# Patient Record
Sex: FEMALE | Race: WHITE | NOT HISPANIC OR LATINO | Employment: OTHER | URBAN - METROPOLITAN AREA
[De-identification: names, ages, dates, MRNs, and addresses within clinical notes are randomized per-mention and may not be internally consistent; named-entity substitution may affect disease eponyms.]

---

## 2017-01-23 ENCOUNTER — COMMUNICATION - HEALTHEAST (OUTPATIENT)
Dept: FAMILY MEDICINE | Facility: CLINIC | Age: 72
End: 2017-01-23

## 2017-01-23 DIAGNOSIS — E03.9 UNSPECIFIED HYPOTHYROIDISM: ICD-10-CM

## 2017-01-23 DIAGNOSIS — E78.5 HYPERLIPIDEMIA: ICD-10-CM

## 2017-04-13 ENCOUNTER — COMMUNICATION - HEALTHEAST (OUTPATIENT)
Dept: SCHEDULING | Facility: CLINIC | Age: 72
End: 2017-04-13

## 2017-04-13 DIAGNOSIS — Z91.09 OTHER ALLERGY, OTHER THAN TO MEDICINAL AGENTS: ICD-10-CM

## 2017-04-18 ENCOUNTER — COMMUNICATION - HEALTHEAST (OUTPATIENT)
Dept: FAMILY MEDICINE | Facility: CLINIC | Age: 72
End: 2017-04-18

## 2017-04-18 DIAGNOSIS — Z91.09 OTHER ALLERGY, OTHER THAN TO MEDICINAL AGENTS: ICD-10-CM

## 2017-04-21 ENCOUNTER — COMMUNICATION - HEALTHEAST (OUTPATIENT)
Dept: FAMILY MEDICINE | Facility: CLINIC | Age: 72
End: 2017-04-21

## 2017-04-21 DIAGNOSIS — I10 UNSPECIFIED ESSENTIAL HYPERTENSION: ICD-10-CM

## 2017-04-21 DIAGNOSIS — E53.8 OTHER B-COMPLEX DEFICIENCIES: ICD-10-CM

## 2017-04-21 DIAGNOSIS — N39.41 URGE URINARY INCONTINENCE: ICD-10-CM

## 2017-04-21 DIAGNOSIS — E03.9 UNSPECIFIED HYPOTHYROIDISM: ICD-10-CM

## 2017-04-25 ENCOUNTER — COMMUNICATION - HEALTHEAST (OUTPATIENT)
Dept: FAMILY MEDICINE | Facility: CLINIC | Age: 72
End: 2017-04-25

## 2017-04-25 DIAGNOSIS — E03.9 UNSPECIFIED HYPOTHYROIDISM: ICD-10-CM

## 2017-04-25 DIAGNOSIS — N39.41 URGE URINARY INCONTINENCE: ICD-10-CM

## 2017-04-25 DIAGNOSIS — I10 UNSPECIFIED ESSENTIAL HYPERTENSION: ICD-10-CM

## 2017-05-01 ENCOUNTER — HOSPITAL ENCOUNTER (OUTPATIENT)
Dept: MAMMOGRAPHY | Facility: CLINIC | Age: 72
Discharge: HOME | End: 2017-05-01
Attending: FAMILY MEDICINE

## 2017-05-01 DIAGNOSIS — Z12.31 VISIT FOR SCREENING MAMMOGRAM: ICD-10-CM

## 2017-05-18 ENCOUNTER — RECORDS - HEALTHEAST (OUTPATIENT)
Dept: ADMINISTRATIVE | Facility: OTHER | Age: 72
End: 2017-05-18

## 2017-05-30 ENCOUNTER — COMMUNICATION - HEALTHEAST (OUTPATIENT)
Dept: FAMILY MEDICINE | Facility: CLINIC | Age: 72
End: 2017-05-30

## 2017-05-30 DIAGNOSIS — Z91.09 OTHER ALLERGY, OTHER THAN TO MEDICINAL AGENTS: ICD-10-CM

## 2017-07-11 ENCOUNTER — OFFICE VISIT - HEALTHEAST (OUTPATIENT)
Dept: FAMILY MEDICINE | Facility: CLINIC | Age: 72
End: 2017-07-11

## 2017-07-11 DIAGNOSIS — E03.9 UNSPECIFIED HYPOTHYROIDISM: ICD-10-CM

## 2017-07-11 DIAGNOSIS — E53.8 OTHER B-COMPLEX DEFICIENCIES: ICD-10-CM

## 2017-07-11 DIAGNOSIS — I10 ESSENTIAL HYPERTENSION WITH GOAL BLOOD PRESSURE LESS THAN 140/90: ICD-10-CM

## 2017-07-11 DIAGNOSIS — E55.9 VITAMIN D DEFICIENCY: ICD-10-CM

## 2017-07-11 DIAGNOSIS — N39.41 URGE URINARY INCONTINENCE: ICD-10-CM

## 2017-07-11 DIAGNOSIS — I10 UNSPECIFIED ESSENTIAL HYPERTENSION: ICD-10-CM

## 2017-07-11 DIAGNOSIS — E03.9 HYPOTHYROIDISM: ICD-10-CM

## 2017-07-11 DIAGNOSIS — Z91.09 OTHER ALLERGY, OTHER THAN TO MEDICINAL AGENTS: ICD-10-CM

## 2017-07-11 DIAGNOSIS — E78.5 HYPERLIPIDEMIA: ICD-10-CM

## 2017-07-11 ASSESSMENT — MIFFLIN-ST. JEOR: SCORE: 1122.31

## 2017-07-24 ENCOUNTER — COMMUNICATION - HEALTHEAST (OUTPATIENT)
Dept: FAMILY MEDICINE | Facility: CLINIC | Age: 72
End: 2017-07-24

## 2017-08-02 ENCOUNTER — TRANSFERRED RECORDS (OUTPATIENT)
Dept: HEALTH INFORMATION MANAGEMENT | Facility: CLINIC | Age: 72
End: 2017-08-02

## 2017-08-05 ENCOUNTER — TRANSFERRED RECORDS (OUTPATIENT)
Dept: HEALTH INFORMATION MANAGEMENT | Facility: CLINIC | Age: 72
End: 2017-08-05

## 2017-08-07 ENCOUNTER — NURSING HOME VISIT (OUTPATIENT)
Dept: GERIATRICS | Facility: CLINIC | Age: 72
End: 2017-08-07
Payer: MEDICARE

## 2017-08-07 VITALS
HEART RATE: 96 BPM | TEMPERATURE: 99 F | BODY MASS INDEX: 31.06 KG/M2 | WEIGHT: 158.2 LBS | DIASTOLIC BLOOD PRESSURE: 90 MMHG | OXYGEN SATURATION: 96 % | RESPIRATION RATE: 18 BRPM | SYSTOLIC BLOOD PRESSURE: 138 MMHG | HEIGHT: 60 IN

## 2017-08-07 DIAGNOSIS — D64.9 POSTOPERATIVE ANEMIA: ICD-10-CM

## 2017-08-07 DIAGNOSIS — R53.81 PHYSICAL DECONDITIONING: ICD-10-CM

## 2017-08-07 DIAGNOSIS — Z47.1 AFTERCARE FOLLOWING RIGHT HIP JOINT REPLACEMENT SURGERY: Primary | ICD-10-CM

## 2017-08-07 DIAGNOSIS — Z96.641 S/P TOTAL HIP ARTHROPLASTY, RIGHT: ICD-10-CM

## 2017-08-07 DIAGNOSIS — M81.0 OSTEOPOROSIS, UNSPECIFIED OSTEOPOROSIS TYPE, UNSPECIFIED PATHOLOGICAL FRACTURE PRESENCE: ICD-10-CM

## 2017-08-07 DIAGNOSIS — I10 ESSENTIAL HYPERTENSION, BENIGN: ICD-10-CM

## 2017-08-07 DIAGNOSIS — K59.01 SLOW TRANSIT CONSTIPATION: ICD-10-CM

## 2017-08-07 DIAGNOSIS — E03.9 HYPOTHYROIDISM, UNSPECIFIED TYPE: ICD-10-CM

## 2017-08-07 DIAGNOSIS — G47.33 OSA (OBSTRUCTIVE SLEEP APNEA): ICD-10-CM

## 2017-08-07 DIAGNOSIS — E78.5 HYPERLIPIDEMIA, UNSPECIFIED HYPERLIPIDEMIA TYPE: ICD-10-CM

## 2017-08-07 DIAGNOSIS — Z96.641 AFTERCARE FOLLOWING RIGHT HIP JOINT REPLACEMENT SURGERY: Primary | ICD-10-CM

## 2017-08-07 PROCEDURE — 99310 SBSQ NF CARE HIGH MDM 45: CPT | Performed by: NURSE PRACTITIONER

## 2017-08-07 RX ORDER — MOMETASONE FUROATE MONOHYDRATE 50 UG/1
2 SPRAY, METERED NASAL DAILY
COMMUNITY
End: 2019-06-12

## 2017-08-07 NOTE — PROGRESS NOTES
[facility]  PRIMARY CARE PROVIDER AND CLINIC:  No primary care provider on file. No primary physician on file.  Chief Complaint   Patient presents with     Hospital F/U       HPI:    Claudia Powers is a 72 year old  (1945),admitted to the [facility]CU from [facility].  Hospital stay 08/02/2017 through 08/05/2017.  Admitted to this facility for  rehab, medical management and nursing care.  HPI information obtained from: facility chart records, facility staff, patient report and [facility] chart review.      Current issues are:      Aftercare following right hip joint replacement surgery  S/P total hip arthroplasty, right  Physical deconditioning  Post op anemia  Patient with history hip pain and is now s/p right MU on 8/2/17 by Dr Zapata at [facility]. She did not have any complications post op. She is on Lovenox x 2 weeks, then transition to Xarelto x 30 days for DVT prophylaxis and admitted to TCU for rehab. At this time up with walker and assist. Pain not well managed - agrees with short term scheduled pain meds. Dressing right hip to be changed daily per DC orders. Hgb at [facility] in the 9 range post op. No s/s bleeding or bruising at TCU.     Osteoporosis, unspecified osteoporosis type, unspecified pathological fracture presence  Chronic issue and takes ergocalciferol, Prolia and calcium.     Hypothyroidism, unspecified type  By history. PTA synthroid continued.     Hyperlipidemia, unspecified hyperlipidemia type  By history. Takes Simvastatin daily. No changes.     Essential hypertension, benign  Since admission some elevated BPs - suspect due to pain. BP Range: 138-174/81-95, HR Range: 67-96 bpm, Wt Range: 158.2 - 160lbs. Takes Metoprolol 25 mg PO daily.   BP Readings from Last 3 Encounters:   08/07/17 138/90   09/07/16 114/71   08/30/16 105/71     МАРИЯ (obstructive sleep apnea)  Does not use CPAP apparently. No respiratory issues at TCU since admission.     Slow  transit constipation  Reports regular stools - doing well with Miralax and Senna S daily.       CODE STATUS/ADVANCE DIRECTIVES DISCUSSION:   Full Code until able to review/complete POLST  Patient's living condition: lives alone    ALLERGIES:Adhesive tape; Banana; Bees; Ondansetron; Pentazocine; Raspberry; Strawberry; and Talwin nx [pentazocine-naloxone]  PAST MEDICAL HISTORY:  has no past medical history on file.  PAST SURGICAL HISTORY:  has no past surgical history on file.  FAMILY HISTORY: family history is not on file.  SOCIAL HISTORY:      Post Discharge Medication Reconciliation Status: discharge medications reconciled and changed, per note/orders (see AVS).  Current Outpatient Prescriptions   Medication Sig Dispense Refill     enoxaparin (LOVENOX) 40 MG/0.4ML injection Inject 40 mg Subcutaneous daily Until 8/15/2017 then transition to xarelto       mometasone (NASONEX) 50 MCG/ACT spray Spray 2 sprays into both nostrils daily       denosumab (PROLIA) 60 MG/ML SOLN injection Inject 60 mg Subcutaneous every 6 months Due December 2017       OXYCODONE HCL PO Take 5 mg by mouth every 8 hours       polyethylene glycol (MIRALAX/GLYCOLAX) powder Take 17 g by mouth daily as needed May use with or in place of Senna-doc. mix with 6 oz of water. Hold for loose stools       senna-docusate (SENOKOT-S;PERICOLACE) 8.6-50 MG per tablet Take 1 tablet by mouth daily Hold for loose stools.        ACETAMINOPHEN PO Take 1,000 mg by mouth 3 times daily        EPINEPHrine (EPIPEN) 0.3 MG/0.3ML injection Inject 0.3 mg into the muscle once as needed for anaphylaxis       LEVOTHYROXINE SODIUM PO Take 75 mcg by mouth daily       METOPROLOL SUCCINATE ER PO Take 25 mg by mouth daily       OXYCODONE HCL PO Take 5 mg by mouth every 4 hours as needed Do not take within 6 hours of Tramadol       TRAMADOL HCL PO Take 50 mg by mouth every 6 hours as needed for moderate to severe pain Do not take within 6 hours of oxycodone       SIMVASTATIN PO  Take 40 mg by mouth At Bedtime       calcium carbonate (OS-MARCEL 600 MG [place]. [place]) 600 MG TABS tablet Take 2 tablets by mouth daily       vitamin D (ERGOCALCIFEROL) 03848 UNIT capsule Take 50,000 Units by mouth daily Take every Mon, Fri       oxybutynin (DITROPAN-XL) 10 MG 24 hr tablet Take 10 mg by mouth daily       rivaroxaban ANTICOAGULANT (XARELTO) 10 MG TABS tablet Take 10 mg by mouth daily (with dinner) Start on 9/7 and stop on 10/4         ROS:  10 point ROS of systems including Constitutional, Eyes, Respiratory, Cardiovascular, Gastroenterology, Genitourinary, Integumentary, Musculoskeletal, Psychiatric were all negative except for pertinent positives noted in my HPI.    Exam:  /90  Pulse 96  Temp 99  F (37.2  C)  Resp 18  Ht 5' (1.524 m)  Wt 158 lb 3.2 oz (71.8 kg)  SpO2 96%  BMI 30.9 kg/m2  GENERAL APPEARANCE:  Alert, in no distress, pleasant, cooperative, oriented x 4  EYES:  EOM, lids, pupils and irises normal, sclera clear and conjunctiva normal, no discharge or mattering on lids or lashes noted  ENT:  Mouth normal, moist mucous membranes, nose normal without drainage or crusting, external ears without lesions, hearing acuity intact  NECK:  Nontender, supple, symmetrical; no adenopathy, masses or thyromegaly, trachea midline  RESP:  respiratory effort and palpation of chest normal, no chest wall tenderness, no respiratory distress, Lung sounds clear, patient is on room air  CV:  Palpation and auscultation of heart done, rate and rhythm controlled, no murmur, no rub or gallop. Edema trace bilateral lower extremities. VASCULAR: no open areas lower legs  ABDOMEN:  normal bowel sounds, soft, nontender, no grimacing or guarding with palpation, no hepatosplenomegaly or other masses  M/S:   Gait and station ambulates independently with walker and walks with assist , Digits and nails normal, no tenderness or swelling of the joints; able to move all extremities   SKIN:  Inspection and palpation of skin  and subcutaneous tissue: right hip incision intact, no redness or drainage covered with dry clean dressing  NEURO: cranial nerves 2-12 grossly intact, no facial asymmetry, no speech deficits and able to follow directions, moves all extremities symmetrically  PSYCH:  insight and judgement intact, memory intact, affect and mood normal     Lab/Diagnostic data:  [place] labs:  8/5/2017  Hemoglobin 9.5  Hematocrit 29.2  Erythrocytes 3.17  MCV  92.1  RBC  13.8  Leukocytes 4.4  Platelet count 200  Sodium  141  Potassium 3.6  Glucose 92  Creatinine 0.3  BUN  15  Chloride 103  Bicaroonate 27  Anion Gap 11      ASSESSMENT/PLAN:  Aftercare following right hip joint replacement surgery  S/P total hip arthroplasty, right  Post op anemia  Ongoing issue, healing post op. Pain not well managed- schedule pain meds short term.     Osteoporosis, unspecified osteoporosis type, unspecified pathological fracture presence  Chronic, meds as above. Monitor.     Hypothyroidism, unspecified type  Chronic. PTA synthroid.     Hyperlipidemia, unspecified hyperlipidemia type  Chronic. PTA statin.     Essential hypertension, benign  Chronic, elevated since admission at times - feels this is r/t pain. Monitor for now. Increase pain meds as above.     МАРИЯ (obstructive sleep apnea)  Chronic, does not use CPAP. No respiratory issues - monitor.     Slow transit constipation  Acute with surgery and pain meds; managed well with current meds. Monitor.     Physical deconditioning  Acute with surgery - therapies, f/u with progress next week.     Orders:  1. Oxycodone 5 mg PO every 8 hrs scheduled x 7 days, then stop. Continue PRN oxycodone as ordered.   2. OK to give PRN ultram if PRN oxycodone used and ineffective.   3. Change Tylenol to 1000 mg PO TID and DC PRN tylenol diagnosis pain  4. Change right hip dressing daily and PRN  5. Check CBC, BMP on 8/9 diagnosis HTN, anemia post op    Total time spent with patient visit at the skilled nursing facility was  35 min including patient visit and review of past records. Greater than 50% of total time spent with counseling and coordinating care due to above health care needs, POC discussion    Electronically signed by:  KAIDEN Clancy CNP

## 2017-08-09 ENCOUNTER — TRANSFERRED RECORDS (OUTPATIENT)
Dept: HEALTH INFORMATION MANAGEMENT | Facility: CLINIC | Age: 72
End: 2017-08-09

## 2017-08-09 LAB
ANION GAP SERPL CALCULATED.3IONS-SCNC: 8 MMOL/L (ref 3–14)
BUN SERPL-MCNC: 16 MG/DL (ref 7–30)
CALCIUM SERPL-MCNC: 8.1 MG/DL (ref 8.5–10.1)
CHLORIDE SERPLBLD-SCNC: 105 MMOL/L (ref 94–109)
CO2 SERPL-SCNC: 28 MMOL/L (ref 20–32)
CREAT SERPL-MCNC: 0.37 MG/DL (ref 0.52–1.04)
ERYTHROCYTE [DISTWIDTH] IN BLOOD BY AUTOMATED COUNT: 14.2 % (ref 10–15)
GFR SERPL CREATININE-BSD FRML MDRD: >90 ML/MIN/1.73M2
GLUCOSE SERPL-MCNC: 78 MG/DL (ref 70–99)
HCT VFR BLD AUTO: 30.6 % (ref 35–47)
HEMOGLOBIN: 10 G/DL (ref 11.7–15.7)
MCH RBC QN AUTO: 29.8 PG (ref 26.5–33)
MCHC RBC AUTO-ENTMCNC: 32.7 G/DL (ref 31.5–36.5)
MCV RBC AUTO: 91 FL (ref 78–100)
PLATELET # BLD AUTO: 314 10^9/L (ref 150–450)
POTASSIUM SERPL-SCNC: 3.4 MMOL/L (ref 3.4–5.3)
RBC # BLD AUTO: 3.36 10^12/L (ref 3.8–5.2)
SODIUM SERPL-SCNC: 141 MMOL/L (ref 133–144)
WBC # BLD AUTO: 4.4 10^9/L (ref 4–11)

## 2017-08-10 ENCOUNTER — NURSING HOME VISIT (OUTPATIENT)
Dept: GERIATRICS | Facility: CLINIC | Age: 72
End: 2017-08-10
Payer: MEDICARE

## 2017-08-10 VITALS
SYSTOLIC BLOOD PRESSURE: 131 MMHG | TEMPERATURE: 99.7 F | BODY MASS INDEX: 30.7 KG/M2 | DIASTOLIC BLOOD PRESSURE: 75 MMHG | WEIGHT: 156.4 LBS | OXYGEN SATURATION: 96 % | HEIGHT: 60 IN | RESPIRATION RATE: 18 BRPM | HEART RATE: 90 BPM

## 2017-08-10 VITALS
HEIGHT: 60 IN | SYSTOLIC BLOOD PRESSURE: 131 MMHG | BODY MASS INDEX: 30.7 KG/M2 | WEIGHT: 156.4 LBS | DIASTOLIC BLOOD PRESSURE: 75 MMHG | OXYGEN SATURATION: 96 % | HEART RATE: 90 BPM | TEMPERATURE: 99.7 F | RESPIRATION RATE: 18 BRPM

## 2017-08-10 DIAGNOSIS — Z86.718 HISTORY OF VENOUS THROMBOEMBOLISM: ICD-10-CM

## 2017-08-10 DIAGNOSIS — K59.01 SLOW TRANSIT CONSTIPATION: ICD-10-CM

## 2017-08-10 DIAGNOSIS — I10 ESSENTIAL HYPERTENSION, BENIGN: ICD-10-CM

## 2017-08-10 DIAGNOSIS — R53.81 PHYSICAL DECONDITIONING: ICD-10-CM

## 2017-08-10 DIAGNOSIS — Z96.641 HISTORY OF TOTAL RIGHT HIP ARTHROPLASTY: Primary | ICD-10-CM

## 2017-08-10 PROBLEM — D62 ACUTE BLOOD LOSS ANEMIA: Status: ACTIVE | Noted: 2017-08-10

## 2017-08-10 PROCEDURE — 99309 SBSQ NF CARE MODERATE MDM 30: CPT | Performed by: NURSE PRACTITIONER

## 2017-08-10 NOTE — PROGRESS NOTES
[facility]    Chief Complaint   Patient presents with     RECHECK       HPI:    Claudia Powers is a 72 year old  (1945), who is being seen today for an episodic care visit at [place] [facility].  HPI information obtained from: facility chart records, facility staff, patient report and [facility] chart review.Today's concern is:  History of total right hip arthroplasty  Patient transferred to TCU following right hip replacement due to OA. There no post op complications. She does have right hip pain. She has been taking oxycodone TID and q 4 h prn. She would like to start cutting back on oxycodone. She she is walking with a walker and a cane.     History of venous thromboembolism, 2010  DVT prophylaxis is lovenox until 8/15 then xarelto for 30d    Essential hypertension, benign  BP's in TCU: 131/75, 138/90, 171/89  No recent change in BP meds  Slow transit constipation  Having regular bowel movments    Physical deconditioning  Lives in [place] in house. Hopes to be off of pain meds soon so she can drive        BP's in TCU: 131/75, 138/90, 171/89    ALLERGIES: Adhesive tape; Banana; Bees; Ondansetron; Pentazocine; Raspberry; Strawberry; and Talwin nx [pentazocine-naloxone]  Past Medical, Surgical, Family and Social History reviewed and updated in [facility].    Current Outpatient Prescriptions   Medication Sig Dispense Refill     enoxaparin (LOVENOX) 40 MG/0.4ML injection Inject 40 mg Subcutaneous daily Until 8/15/2017 then transition to xarelto       mometasone (NASONEX) 50 MCG/ACT spray Spray 2 sprays into both nostrils daily       denosumab (PROLIA) 60 MG/ML SOLN injection Inject 60 mg Subcutaneous every 6 months Due December 2017       OXYCODONE HCL PO Take 5 mg by mouth every 8 hours       polyethylene glycol (MIRALAX/GLYCOLAX) powder Take 17 g by mouth daily as needed May use with or in place of Senna-doc. mix with 6 oz of water. Hold for loose stools       senna-docusate  (SENOKOT-S;PERICOLACE) 8.6-50 MG per tablet Take 1 tablet by mouth daily Hold for loose stools.        ACETAMINOPHEN PO Take 1,000 mg by mouth 3 times daily        EPINEPHrine (EPIPEN) 0.3 MG/0.3ML injection Inject 0.3 mg into the muscle once as needed for anaphylaxis       LEVOTHYROXINE SODIUM PO Take 75 mcg by mouth daily       METOPROLOL SUCCINATE ER PO Take 25 mg by mouth daily       OXYCODONE HCL PO Take 5 mg by mouth every 4 hours as needed Do not take within 6 hours of Tramadol       TRAMADOL HCL PO Take 50 mg by mouth every 6 hours as needed for moderate to severe pain Do not take within 6 hours of oxycodone       SIMVASTATIN PO Take 40 mg by mouth At Bedtime       calcium carbonate (OS-MARCEL 600 MG [place]. [place]) 600 MG TABS tablet Take 2 tablets by mouth daily       vitamin D (ERGOCALCIFEROL) 85019 UNIT capsule Take 50,000 Units by mouth daily Take every Mon, Fri       oxybutynin (DITROPAN-XL) 10 MG 24 hr tablet Take 10 mg by mouth daily       rivaroxaban ANTICOAGULANT (XARELTO) 10 MG TABS tablet Take 10 mg by mouth daily (with dinner) Start on 9/7 and stop on 10/4       Medications reviewed:  Medications reconciled to facility chart and changes were made to reflect current medications as identified as above med list. Below are the changes that were made:   Medications stopped since last EPIC medication reconciliation:   There are no discontinued medications.    Medications started since last [facility] medication reconciliation:  No orders of the defined types were placed in this encounter.        REVIEW OF SYSTEMS:  4 point ROS including Respiratory, CV, GI and , other than that noted in the HPI,  is negative    Physical Exam:  /75  Pulse 90  Temp 99.7  F (37.6  C)  Resp 18  Ht 5' (1.524 m)  Wt 156 lb 6.4 oz (70.9 kg)  SpO2 96%  BMI 30.54 kg/m2  GENERAL APPEARANCE:  Alert, in no distress  ENT:  Mouth and posterior oropharynx normal, moist mucous membranes, hearing acuity leoncio q  EYES:  EOM,  conjunctivae, lids, pupils and irises normal  NECK:  No adenopathy,masses or thyromegaly  RESP:  respiratory effort and palpation of chest normal, no respiratory distress, Lung sounds clear  CV:  Palpation and auscultation of heart done , rate and rhythm reg, no murmur, no rub or gallop, Edema none  ABDOMEN:  normal bowel sounds, soft, nontender, no hepatosplenomegaly or other masses  M/S:   Gait and station steady, Digits and nails normal   SKIN:  Inspection/Palpation of skin and subcutaneous tissue bruise with swelling on inside of left knee. About 4cm  NEURO: 2-12 in normal limits and at patient's baseline  PSYCH:  insight and judgement, memory intact , affect and mood normal      Recent Labs:    8/9/17 hgb 10  [place] labs:  8/5/2017  Hemoglobin 9.5  Hematocrit 29.2  Erythrocytes 3.17  MCV  92.1  RBC  13.8  Leukocytes 4.4  Platelet count 200  Sodium  141  Potassium 3.6  Glucose 92  Creatinine 0.3  BUN  15  Chloride 103  Bicaroonate 27  Anion Gap 11    Assessment/Plan:  (Z96.641) History of total right hip arthroplasty  (primary encounter diagnosis)  Comment: s/p MU due to arthritis. Less pain today. Walking length of hallway with therapy. Has walked up and down steps  Plan: reduce oxycodone to TID, continue physical therapy     (Z86.638) History of venous thromboembolism, 2010  Comment: on loveonx for DVT prophylaxis  Plan: continue lovenox unitl 8/15 then start xarelto    (I10) Essential hypertension, benign  Comment: adequate control  Plan: no change    (K59.01) Slow transit constipation  Comment: having daily BMs  Plan: no change    (R53.81) Physical deconditioning  Comment: due to MU on right  Plan: physical therapy and OCCUPATIONAL THERAPY . Likely discharge soon    Electronically signed by  KAIDEN Parker CNP

## 2017-08-11 ENCOUNTER — NURSING HOME VISIT (OUTPATIENT)
Dept: GERIATRICS | Facility: CLINIC | Age: 72
End: 2017-08-11
Payer: MEDICARE

## 2017-08-11 DIAGNOSIS — R53.81 PHYSICAL DECONDITIONING: Primary | ICD-10-CM

## 2017-08-11 DIAGNOSIS — Z96.641 S/P TOTAL HIP ARTHROPLASTY, RIGHT: ICD-10-CM

## 2017-08-11 DIAGNOSIS — I10 ESSENTIAL HYPERTENSION, BENIGN: ICD-10-CM

## 2017-08-11 DIAGNOSIS — M16.0 PRIMARY OSTEOARTHRITIS OF BOTH HIPS: ICD-10-CM

## 2017-08-11 DIAGNOSIS — G47.33 OSA (OBSTRUCTIVE SLEEP APNEA): ICD-10-CM

## 2017-08-11 DIAGNOSIS — Z86.718 HISTORY OF VENOUS THROMBOEMBOLISM: ICD-10-CM

## 2017-08-11 PROCEDURE — 99305 1ST NF CARE MODERATE MDM 35: CPT | Performed by: INTERNAL MEDICINE

## 2017-08-11 NOTE — PROGRESS NOTES
PRIMARY CARE PROVIDER AND CLINIC RESPONSIBLE:  Katia Kenny, [address] [address]        ADMISSION HISTORY AND PHYSICAL EXAMINATION     Chief Complaint   Patient presents with     Fatigue     Musculoskeletal Problem         HISTORY OF PRESENT ILLNESS:  72 year old female, (1945), admitted to the [place] [facility] for continuation of medical care and rehab.    Claudia Powers is a 72 year old female with history of breast cancer, DVT, hypertension, hyperlipidemia, МАРИЯ, OA and osteoporosis who was admitted at [facility] from 8/2/17 to 8/5/17 due to elective right total hip arthroplasty. Postop course was uneventful. She has constipation which is resolved. She has also has pain. Hx of DVT, Lovenox for 2 weeks and Xarelto was ordered. She has pain at legt hip with movement. She has BM. She slept ok last night. Hx of sleep apnea but no using CPAP. Patient denies chest pain, dyspnea, abdominal pain, n&v, fever, chills, dysuria. The rest of ROS is unremarkable. Patient is seen and examined by me with NORRIS Savage NP. Please see NORRIS Savage's admit noted dated 8/7/17 for details of admission, past medical history, family history, allergies, medication list, social history and other details pertinent with this admission. Hospital admission and dc summary reviewed.    Patient Active Problem List    Diagnosis Date Noted     History of total right hip arthroplasty 08/10/2017     Priority: Medium     Acute blood loss anemia 08/10/2017     Priority: Medium     Osteoporosis, unspecified osteoporosis type, unspecified pathological fracture presence 08/07/2017     Priority: Medium     Hypothyroidism, unspecified type 08/07/2017     Priority: Medium     Hyperlipidemia, unspecified hyperlipidemia type 08/07/2017     Priority: Medium     Essential hypertension, benign 08/07/2017     Priority: Medium     МАРИЯ (obstructive sleep apnea) 08/07/2017     Priority: Medium     Slow transit constipation  08/07/2017     Priority: Medium     Physical deconditioning 08/07/2017     Priority: Medium     Status post total left knee replacement on 8/23/16 by Dr. Zapata 08/26/2016     Priority: Medium     Aftercare following left knee joint replacement surgery 08/26/2016     Priority: Medium     Status post total right knee replacement on 5/23/16 by  Dr. Mars 05/27/2016     Priority: Medium     Aftercare following right knee joint replacement surgery 05/27/2016     Priority: Medium     Degenerative joint disease of knee 05/27/2016     Priority: Medium     History of venous thromboembolism, 2010 05/27/2016     Priority: Medium     History of breast cancer 05/27/2016     Priority: Medium     Current Outpatient Prescriptions   Medication Sig     enoxaparin (LOVENOX) 40 MG/0.4ML injection Inject 40 mg Subcutaneous daily Until 8/15/2017 then transition to xarelto     mometasone (NASONEX) 50 MCG/ACT spray Spray 2 sprays into both nostrils daily     denosumab (PROLIA) 60 MG/ML SOLN injection Inject 60 mg Subcutaneous every 6 months Due December 2017     polyethylene glycol (MIRALAX/GLYCOLAX) powder Take 17 g by mouth daily as needed May use with or in place of Senna-doc. mix with 6 oz of water. Hold for loose stools     senna-docusate (SENOKOT-S;PERICOLACE) 8.6-50 MG per tablet Take 1 tablet by mouth daily Hold for loose stools.      ACETAMINOPHEN PO Take 1,000 mg by mouth 3 times daily      EPINEPHrine (EPIPEN) 0.3 MG/0.3ML injection Inject 0.3 mg into the muscle once as needed for anaphylaxis     LEVOTHYROXINE SODIUM PO Take 75 mcg by mouth daily     METOPROLOL SUCCINATE ER PO Take 25 mg by mouth daily     OXYCODONE HCL PO Take 5 mg by mouth 3 times daily And 5 mg every 6 hours as needed.     TRAMADOL HCL PO Take 50 mg by mouth every 6 hours as needed for moderate to severe pain Do not take within 6 hours of oxycodone     SIMVASTATIN PO Take 40 mg by mouth At Bedtime     calcium carbonate (OS-MARCEL 600 MG [place]. [place]) 600 MG TABS  tablet Take 2 tablets by mouth daily     vitamin D (ERGOCALCIFEROL) 01459 UNIT capsule Take 50,000 Units by mouth daily Take every Mon, Fri     oxybutynin (DITROPAN-XL) 10 MG 24 hr tablet Take 10 mg by mouth daily     rivaroxaban ANTICOAGULANT (XARELTO) 10 MG TABS tablet Take 10 mg by mouth daily (with dinner) Start on 9/7 and stop on 10/4     No current facility-administered medications for this visit.        Allergies   Allergen Reactions     Adhesive Tape      Banana      Bees      Ondansetron      Pentazocine      Raspberry      Strawberry      Talwin Nx [Pentazocine-Naloxone]        Social History     Social History     Marital status: Single     Spouse name: N/A     Number of children: N/A     Years of education: N/A     Occupational History     Not on file.     Social History Main Topics     Smoking status: Not on file     Smokeless tobacco: Not on file     Alcohol use Not on file     Drug use: Not on file     Sexual activity: Not on file     Other Topics Concern     Not on file     Social History Narrative     No narrative on file          Information reviewed:  Medications, vital signs, orders, nursing notes, problem list, hospital information.     ROS: All 10 point review of system completed, those pertinent positive, please see H&P, the remaining ROS is negative.    /75  Pulse 90  Temp 99.7  F (37.6  C)  Resp 18  Ht 5' (1.524 m)  Wt 156 lb 6.4 oz (70.9 kg)  SpO2 96%  BMI 30.54 kg/m2    PHYSICAL EXAMINATION:   GENERAL:  No acute distress.  SKIN:  Dry and warm.  There is no rash at area of skin examined.  HEENT:  Head without trauma.  Pupils round, reactive. Exam of conjunctiva and lids are normal. Sclera without icterus. There is no oral thrush.  NECK:  Supple. No jugular venous distension.  CHEST: No reproducible chest tenderness.   LUNGS:  Normal respiratory effort. Lungs reveal decreased breath sounds at bases. No rales or wheezes.  HEART:  Regular rate and rhythm.  No murmur, gallops or  rubs auscultated.  ABDOMEN:  Soft, bowel sounds positive.  There is no tenderness or guarding.   EXTREMITIES: trace edema right leg. Right hip surgery wound clean. Tender right hip and upper thigh.  NEUROLOGIC:  Alert and oriented x3.  There is no focal deficit appreciated.  PSYCH: Recent and remote memory is normal. Mood and affect are normal.    Lab/Diagnostic data:  Reviewed    [place] labs:  8/5/2017  Hemoglobin               9.5  Hematocrit                 29.2  Erythrocytes              3.17  MCV                                    92.1  RBC                                     13.8  Leukocytes                4.4  Platelet count            200  Sodium                      141  Potassium                 3.6  Glucose                     92  Creatinine                  0.3  BUN                                     15  Chloride                     103  Bicaroonate               27  Anion Gap                 11       ASSESSMENT / PLAN:     Physical deconditioning  Plan: PT/OT with increase independence, self-care, strength and endurance and other cares that help return to home/facility of origin, fall precautions. Care conference with patient and family for the progress of rehab and disposition issues will be discussed as planned. Rehab evaluation and other evaluations including CPT are at rehab logs, to be reviewed separately.  Fall risk assessment as well as cognitive evaluation will be formed during rehab stay if indicated.    Primary osteoarthritis of both hips S/P total hip arthroplasty, right  Plan: PT/OT, pain medication, DVT prophylaxis with Lovenox and then Xarelto as per orthopedic team. Follow up orthopedic clinic as scheduled. Staples removed as instructed. Incentive spirometry prn.  Hx of osteoporosis and pathologic fractures, continue current meds.    Essential hypertension, benign  Plan: Continue metoprolol    МАРИЯ (obstructive sleep apnea)  Plan: no using her CPAP, follow up sleep and primary care  clinic.    History of venous thromboembolism  Plan: Continue DVT prophylaxis with Lovenox and then Xarelto      Total time spent with patient visit was 33 min including patient visit, review of past records, patients counseling and coordinating care.        Chun Alonso MD

## 2017-08-11 NOTE — MR AVS SNAPSHOT
"              After Visit Summary   8/11/2017    Claudia Powers    MRN: [id number]           Patient Information     Date Of Birth          1945        Visit Information        Provider Department      8/11/2017 1:29 PM Chun Alonso MD [facility]        Today's Diagnoses     Physical deconditioning    -  1    Primary osteoarthritis of both hips        S/P total hip arthroplasty, right        Essential hypertension, benign        МАРИЯ (obstructive sleep apnea)        History of venous thromboembolism, 2010           Follow-ups after your visit        Who to contact     If you have questions or need follow up information about today's clinic visit or your schedule please contact [facility] directly at [phone].  Normal or non-critical lab and imaging results will be communicated to you by MyChart, letter or phone within 4 business days after the clinic has received the results. If you do not hear from us within 7 days, please contact the clinic through [facility] or phone. If you have a critical or abnormal lab result, we will notify you by phone as soon as possible.  Submit refill requests through BPT or call your pharmacy and they will forward the refill request to us. Please allow 3 business days for your refill to be completed.          Additional Information About Your Visit        MyChart Information     BPT lets you send messages to your doctor, view your test results, renew your prescriptions, schedule appointments and more. To sign up, go to [email or web address] . Click on \"Log in\" on the left side of the screen, which will take you to the Welcome page. Then click on \"Sign up Now\" on the right side of the page.     You will be asked to enter the access code listed below, as well as some personal information. Please follow the directions to create your username and password.     Your access code is: [email or web address]  Expires: 11/9/2017 10:27 AM     Your " access code will  in 90 days. If you need help or a new code, please call your [place] clinic or [phone].        Care EveryWhere ID     This is your Care EveryWhere ID. This could be used by other organizations to access your [place] medical records  [id number]        Your Vitals Were     Pulse Temperature Respirations Height Pulse Oximetry BMI (Body Mass Index)    90 99.7  F (37.6  C) 18 5' (1.524 m) 96% 30.54 kg/m2       Blood Pressure from Last 3 Encounters:   08/10/17 131/75   08/10/17 131/75   17 138/90    Weight from Last 3 Encounters:   08/10/17 156 lb 6.4 oz (70.9 kg)   08/10/17 156 lb 6.4 oz (70.9 kg)   17 158 lb 3.2 oz (71.8 kg)              Today, you had the following     No orders found for display       Primary Care Provider Office Phone # Fax #    Katia VALDEZ Efraín [phone]       [place] [address]        Equal Access to Services     [facility] : Hadii cornelio ku hadashtracie Sobrendan, waaxda luqadaha, qaybta kaalmada marvel, crista bonilla. So [facility] [phone].    ATENCIÓN: Si habla español, tiene a ramirez disposición servicios gratuitos de asistencia lingüística. [facility] [phone].    We comply with applicable federal civil rights laws and [place] laws. We do not discriminate on the basis of race, color, national origin, age, disability sex, sexual orientation or gender identity.            Thank you!     Thank you for choosing [facility]  for your care. Our goal is always to provide you with excellent care. Hearing back from our patients is one way we can continue to improve our services. Please take a few minutes to complete the written survey that you may receive in the mail after your visit with us. Thank you!             Your Updated Medication List - Protect others around you: Learn how to safely use, store and throw away your medicines at www.disposemymeds.org.          This list  is accurate as of: 8/11/17 10:27 AM.  Always use your most recent med list.                   Brand Name Dispense Instructions for use Diagnosis    ACETAMINOPHEN PO      Take 1,000 mg by mouth 3 times daily        calcium carbonate 600 MG tablet    OS-MARCEL 600 mg [facility] [place]. Ca     Take 2 tablets by mouth daily        denosumab 60 MG/ML Soln injection    PROLIA     Inject 60 mg Subcutaneous every 6 months Due December 2017        enoxaparin 40 MG/0.4ML injection    LOVENOX     Inject 40 mg Subcutaneous daily Until 8/15/2017 then transition to xarelto        EPINEPHrine 0.3 MG/0.3ML injection 2-pack    EPIPEN/ADRENACLICK/or ANY BX GENERIC EQUIV     Inject 0.3 mg into the muscle once as needed for anaphylaxis        LEVOTHYROXINE SODIUM PO      Take 75 mcg by mouth daily        METOPROLOL SUCCINATE ER PO      Take 25 mg by mouth daily        mometasone 50 MCG/ACT spray    NASONEX     Spray 2 sprays into both nostrils daily        oxybutynin 10 MG 24 hr tablet    DITROPAN-XL     Take 10 mg by mouth daily        OXYCODONE HCL PO      Take 5 mg by mouth 3 times daily And 5 mg every 6 hours as needed.        polyethylene glycol powder    MIRALAX/GLYCOLAX     Take 17 g by mouth daily as needed May use with or in place of Senna-doc. mix with 6 oz of water. Hold for loose stools        rivaroxaban ANTICOAGULANT 10 MG Tabs tablet    XARELTO     Take 10 mg by mouth daily (with dinner) Start on 9/7 and stop on 10/4        senna-docusate 8.6-50 MG per tablet    SENOKOT-S;PERICOLACE     Take 1 tablet by mouth daily Hold for loose stools.        SIMVASTATIN PO      Take 40 mg by mouth At Bedtime        TRAMADOL HCL PO      Take 50 mg by mouth every 6 hours as needed for moderate to severe pain Do not take within 6 hours of oxycodone        vitamin D 89206 UNIT capsule    ERGOCALCIFEROL     Take 50,000 Units by mouth daily Take every Mon, Fri

## 2017-08-14 ENCOUNTER — DISCHARGE SUMMARY NURSING HOME (OUTPATIENT)
Dept: GERIATRICS | Facility: CLINIC | Age: 72
End: 2017-08-14
Payer: MEDICARE

## 2017-08-14 ENCOUNTER — RECORDS - HEALTHEAST (OUTPATIENT)
Dept: ADMINISTRATIVE | Facility: OTHER | Age: 72
End: 2017-08-14

## 2017-08-14 VITALS
RESPIRATION RATE: 18 BRPM | OXYGEN SATURATION: 97 % | TEMPERATURE: 98.4 F | HEART RATE: 78 BPM | SYSTOLIC BLOOD PRESSURE: 137 MMHG | HEIGHT: 60 IN | WEIGHT: 158.4 LBS | DIASTOLIC BLOOD PRESSURE: 85 MMHG | BODY MASS INDEX: 31.1 KG/M2

## 2017-08-14 DIAGNOSIS — Z96.641 HISTORY OF TOTAL RIGHT HIP ARTHROPLASTY: Primary | ICD-10-CM

## 2017-08-14 DIAGNOSIS — I10 ESSENTIAL HYPERTENSION, BENIGN: ICD-10-CM

## 2017-08-14 DIAGNOSIS — K59.01 SLOW TRANSIT CONSTIPATION: ICD-10-CM

## 2017-08-14 DIAGNOSIS — R53.81 PHYSICAL DECONDITIONING: ICD-10-CM

## 2017-08-14 DIAGNOSIS — Z86.718 HISTORY OF VENOUS THROMBOEMBOLISM: ICD-10-CM

## 2017-08-14 PROCEDURE — 99316 NF DSCHRG MGMT 30 MIN+: CPT | Performed by: NURSE PRACTITIONER

## 2017-08-14 NOTE — PROGRESS NOTES
[place] [facility] DISCHARGE SUMMARY    PATIENT'S NAME: Claudia Powers  YOB: 1945  MEDICAL RECORD NUMBER:  [id number]    PRIMARY CARE PROVIDER AND CLINIC RESPONSIBLE AFTER TRANSFER: Katia Kenny [address] [address]     CODE STATUS/ADVANCE DIRECTIVES DISCUSSION:   CPR/Full code        Allergies   Allergen Reactions     Adhesive Tape      Banana      Bees      Ondansetron      Pentazocine      Raspberry      Strawberry      Talwin Nx [Pentazocine-Naloxone]        TRANSFERRING PROVIDERS: KAIDEN Parker CNP, Chun Alonso MD  DATE OF SNF ADMISSION:  August / 05 / 2017  DATE OF SNF (anticipated) DISCHARGE: August / 15 / 2017  DISCHARGE DISPOSITION: Non-[facility] Provider   Nursing Facility: [facility] stay 08/02/2017 to 08/05/2017.     Condition on Discharge:  Improving.  Function:  Needs assist with ADLs  Cognitive Scores: SLUMS 25/30    Equipment: walker    DISCHARGE DIAGNOSIS:   1. History of total right hip arthroplasty    2. History of venous thromboembolism, 2010    3. Essential hypertension, benign    4. Slow transit constipation    5. Physical deconditioning        HPI Nursing Facility Course:  HPI information obtained from: facility chart records, facility staff, patient report and [facility] chart review.    History of total right hip arthroplasty  Patient transferred to TCU following right hip replacement due to OA. There no post op complications. She does have right hip pain. She has been taking oxycodone TID and tramadol 50mg twice daily. She states she has a plan for cutting back on the oxycodone. Therapy reports she is walking up and down the hallways without any issues. She does use a walker.   History of venous thromboembolism, 2010  DVT prophylaxis is lovenox until 8/15 then xarelto for 30d.     Essential hypertension, benign  BP's in TCU: 137/85, 124/75, 153/79  No recent change in BP meds    Slow  transit constipation  Having regular bowel movments    Physical deconditioning  Lives in [place] in house. Hopes to be off of pain meds soon so she can drive. Her brother will take her home. We will send in home care for physical therapy, OCCUPATIONAL THERAPY and a A.        PAST MEDICAL HISTORY:  has no past medical history on file.    DISCHARGE MEDICATIONS:  Current Outpatient Prescriptions   Medication Sig Dispense Refill     calcium-vitamin D (CALTRATE) 600-400 MG-UNIT per tablet Take 2 tablets by mouth daily       enoxaparin (LOVENOX) 40 MG/0.4ML injection Inject 40 mg Subcutaneous daily Until 8/15/2017 then transition to xarelto       mometasone (NASONEX) 50 MCG/ACT spray Spray 2 sprays into both nostrils daily       denosumab (PROLIA) 60 MG/ML SOLN injection Inject 60 mg Subcutaneous every 6 months Due December 2017       polyethylene glycol (MIRALAX/GLYCOLAX) powder Take 17 g by mouth daily as needed May use with or in place of Senna-doc. mix with 6 oz of water. Hold for loose stools       senna-docusate (SENOKOT-S;PERICOLACE) 8.6-50 MG per tablet Take 1 tablet by mouth daily Hold for loose stools.        ACETAMINOPHEN PO Take 1,000 mg by mouth 3 times daily        EPINEPHrine (EPIPEN) 0.3 MG/0.3ML injection Inject 0.3 mg into the muscle once as needed for anaphylaxis       LEVOTHYROXINE SODIUM PO Take 75 mcg by mouth daily       METOPROLOL SUCCINATE ER PO Take 25 mg by mouth daily       OXYCODONE HCL PO Take 5 mg by mouth 3 times daily And 5 mg every 6 hours as needed.       TRAMADOL HCL PO Take 50 mg by mouth daily And give 50mg every 6 hours as needed for pain, OK to give PRN Ultram if PRN Oxycodone used and not effective.       SIMVASTATIN PO Take 40 mg by mouth At Bedtime       vitamin D (ERGOCALCIFEROL) 01729 UNIT capsule Take 50,000 Units by mouth daily Take every Mon, Fri       oxybutynin (DITROPAN-XL) 10 MG 24 hr tablet Take 10 mg by mouth daily         MEDICATION CHANGES/RATIONALE:   8/7/17  oxycodone 5mg q 8 h for 7 days then DISCONTINUE  Continue 5mg q 4 h prn, change acetaminophen 1000mgTID    Controlled medications sent with patient: Medication: oxycodone 5mg and tramadol 50mg tablets , 20 tabs given to patient at the time of discharge to take home     ROS:    4 point ROS including Respiratory, CV, GI and , other than that noted in the HPI,  is negative    Physical Exam:   Vitals: /85  Pulse 78  Temp 98.4  F (36.9  C)  Resp 18  Ht 5' (1.524 m)  Wt 158 lb 6.4 oz (71.8 kg)  SpO2 97%  BMI 30.94 kg/m2  BMI= Body mass index is 30.94 kg/(m^2).    GENERAL APPEARANCE:  Alert, in no distress  ENT:  Mouth and posterior oropharynx normal, moist mucous membranes, hearing acuity leoncio q  EYES:  EOM, conjunctivae, lids, pupils and irises normal  RESP:  respiratory effort and palpation of chest normal, no respiratory distress, Lung sounds clear  CV:  Palpation and auscultation of heart done , rate and rhythm reg, no murmur, no rub or gallop, Edema none  ABDOMEN:  normal bowel sounds, soft, nontender, no hepatosplenomegaly or other masses  M/S:   Gait and station steady, Digits and nails normal   SKIN:  Inspection/Palpation of skin and subcutaneous tissue bruise with swelling on inside of left knee. About 4cm  NEURO: 2-12 in normal limits and at patient's baseline  PSYCH:  insight and judgement, memory intact , affect and mood normal    DISCHARGE PLAN:  Occupational Therapy, Physical Therapy, Home Health Aide and From:  [facility]   Patient instructed to follow-up with:  PCP in 7-10 days.         MTM referral needed and placed by this provider: No    Pending labs: none  SNF labs .  CBC RESULTS:   Recent Labs   Lab Test 08/09/17   WBC  4.4   RBC  3.36*   HGB  10.0*   HCT  30.6*   MCV  91   MCH  29.8   MCHC  32.7   RDW  14.2   PLT  314       Last Basic Metabolic Panel:  Recent Labs   Lab Test 08/09/17   NA  141   POTASSIUM  3.4   CHLORIDE  105   MARCEL  8.1*   CO2  28   BUN  16   CR  0.37*   GLC  78        Discharge Treatments:none    TOTAL DISCHARGE TIME:   Greater than 30 minutes  Electronically signed by:  KAIDEN Parker CNP

## 2017-08-15 ENCOUNTER — RECORDS - HEALTHEAST (OUTPATIENT)
Dept: ADMINISTRATIVE | Facility: OTHER | Age: 72
End: 2017-08-15

## 2017-08-17 ENCOUNTER — OFFICE VISIT - HEALTHEAST (OUTPATIENT)
Dept: FAMILY MEDICINE | Facility: CLINIC | Age: 72
End: 2017-08-17

## 2017-08-17 ENCOUNTER — COMMUNICATION - HEALTHEAST (OUTPATIENT)
Dept: FAMILY MEDICINE | Facility: CLINIC | Age: 72
End: 2017-08-17

## 2017-08-17 DIAGNOSIS — Z96.641 H/O TOTAL HIP ARTHROPLASTY, RIGHT: ICD-10-CM

## 2017-08-17 ASSESSMENT — MIFFLIN-ST. JEOR: SCORE: 1095.1

## 2017-08-18 ENCOUNTER — COMMUNICATION - HEALTHEAST (OUTPATIENT)
Dept: FAMILY MEDICINE | Facility: CLINIC | Age: 72
End: 2017-08-18

## 2017-08-23 ENCOUNTER — COMMUNICATION - HEALTHEAST (OUTPATIENT)
Dept: FAMILY MEDICINE | Facility: CLINIC | Age: 72
End: 2017-08-23

## 2017-08-25 ENCOUNTER — RECORDS - HEALTHEAST (OUTPATIENT)
Dept: ADMINISTRATIVE | Facility: OTHER | Age: 72
End: 2017-08-25

## 2017-09-11 ENCOUNTER — OFFICE VISIT - HEALTHEAST (OUTPATIENT)
Dept: FAMILY MEDICINE | Facility: CLINIC | Age: 72
End: 2017-09-11

## 2017-09-11 ENCOUNTER — HOSPITAL ENCOUNTER (OUTPATIENT)
Dept: LAB | Age: 72
Setting detail: SPECIMEN
Discharge: HOME | End: 2017-09-11

## 2017-09-11 DIAGNOSIS — N39.0 UTI (URINARY TRACT INFECTION): ICD-10-CM

## 2017-09-13 ENCOUNTER — RECORDS - HEALTHEAST (OUTPATIENT)
Dept: ADMINISTRATIVE | Facility: OTHER | Age: 72
End: 2017-09-13

## 2017-10-06 ENCOUNTER — COMMUNICATION - HEALTHEAST (OUTPATIENT)
Dept: FAMILY MEDICINE | Facility: CLINIC | Age: 72
End: 2017-10-06

## 2017-10-06 ENCOUNTER — RECORDS - HEALTHEAST (OUTPATIENT)
Dept: ADMINISTRATIVE | Facility: OTHER | Age: 72
End: 2017-10-06

## 2017-11-02 ENCOUNTER — OFFICE VISIT - HEALTHEAST (OUTPATIENT)
Dept: FAMILY MEDICINE | Facility: CLINIC | Age: 72
End: 2017-11-02

## 2017-11-02 DIAGNOSIS — N39.41 URGE INCONTINENCE: ICD-10-CM

## 2017-11-02 DIAGNOSIS — M19.90 OSTEOARTHRITIS: ICD-10-CM

## 2017-11-02 DIAGNOSIS — Z91.030 BEE STING ALLERGY: ICD-10-CM

## 2017-11-02 DIAGNOSIS — M81.0 OSTEOPOROSIS: ICD-10-CM

## 2017-11-02 DIAGNOSIS — E78.5 HYPERLIPIDEMIA: ICD-10-CM

## 2017-11-02 DIAGNOSIS — D12.6 TUBULAR ADENOMA OF COLON: ICD-10-CM

## 2017-11-02 ASSESSMENT — MIFFLIN-ST. JEOR: SCORE: 1120.61

## 2017-11-07 ENCOUNTER — COMMUNICATION - HEALTHEAST (OUTPATIENT)
Dept: FAMILY MEDICINE | Facility: CLINIC | Age: 72
End: 2017-11-07

## 2017-11-17 ENCOUNTER — COMMUNICATION - HEALTHEAST (OUTPATIENT)
Dept: FAMILY MEDICINE | Facility: CLINIC | Age: 72
End: 2017-11-17

## 2017-11-17 ENCOUNTER — AMBULATORY - HEALTHEAST (OUTPATIENT)
Dept: NURSING | Facility: CLINIC | Age: 72
End: 2017-11-17

## 2017-11-17 DIAGNOSIS — I10 ESSENTIAL HYPERTENSION: ICD-10-CM

## 2017-12-04 ENCOUNTER — COMMUNICATION - HEALTHEAST (OUTPATIENT)
Dept: FAMILY MEDICINE | Facility: CLINIC | Age: 72
End: 2017-12-04

## 2017-12-04 DIAGNOSIS — I10 ESSENTIAL HYPERTENSION: ICD-10-CM

## 2017-12-11 ENCOUNTER — RECORDS - HEALTHEAST (OUTPATIENT)
Dept: ADMINISTRATIVE | Facility: OTHER | Age: 72
End: 2017-12-11

## 2017-12-11 ENCOUNTER — COMMUNICATION - HEALTHEAST (OUTPATIENT)
Dept: FAMILY MEDICINE | Facility: CLINIC | Age: 72
End: 2017-12-11

## 2017-12-27 ENCOUNTER — RECORDS - HEALTHEAST (OUTPATIENT)
Dept: ADMINISTRATIVE | Facility: OTHER | Age: 72
End: 2017-12-27

## 2018-01-16 ENCOUNTER — COMMUNICATION - HEALTHEAST (OUTPATIENT)
Dept: FAMILY MEDICINE | Facility: CLINIC | Age: 73
End: 2018-01-16

## 2018-01-16 DIAGNOSIS — E55.9 VITAMIN D DEFICIENCY: ICD-10-CM

## 2018-01-16 DIAGNOSIS — E78.5 HYPERLIPIDEMIA: ICD-10-CM

## 2018-01-16 DIAGNOSIS — E03.9 HYPOTHYROIDISM: ICD-10-CM

## 2018-01-16 DIAGNOSIS — E53.8 VITAMIN B12 DEFICIENCY: ICD-10-CM

## 2018-03-13 ENCOUNTER — RECORDS - HEALTHEAST (OUTPATIENT)
Dept: ADMINISTRATIVE | Facility: OTHER | Age: 73
End: 2018-03-13

## 2018-05-07 ENCOUNTER — RECORDS - HEALTHEAST (OUTPATIENT)
Dept: ADMINISTRATIVE | Facility: OTHER | Age: 73
End: 2018-05-07

## 2018-05-11 ENCOUNTER — RECORDS - HEALTHEAST (OUTPATIENT)
Dept: ADMINISTRATIVE | Facility: OTHER | Age: 73
End: 2018-05-11

## 2018-06-30 ENCOUNTER — COMMUNICATION - HEALTHEAST (OUTPATIENT)
Dept: FAMILY MEDICINE | Facility: CLINIC | Age: 73
End: 2018-06-30

## 2018-06-30 DIAGNOSIS — E53.8 VITAMIN B12 DEFICIENCY: ICD-10-CM

## 2018-07-09 ENCOUNTER — COMMUNICATION - HEALTHEAST (OUTPATIENT)
Dept: FAMILY MEDICINE | Facility: CLINIC | Age: 73
End: 2018-07-09

## 2018-07-09 DIAGNOSIS — E55.9 VITAMIN D DEFICIENCY: ICD-10-CM

## 2018-08-18 ENCOUNTER — COMMUNICATION - HEALTHEAST (OUTPATIENT)
Dept: FAMILY MEDICINE | Facility: CLINIC | Age: 73
End: 2018-08-18

## 2018-08-18 DIAGNOSIS — E78.5 HYPERLIPIDEMIA: ICD-10-CM

## 2018-09-25 ENCOUNTER — COMMUNICATION - HEALTHEAST (OUTPATIENT)
Dept: FAMILY MEDICINE | Facility: CLINIC | Age: 73
End: 2018-09-25

## 2018-09-25 DIAGNOSIS — E03.9 HYPOTHYROIDISM: ICD-10-CM

## 2018-09-25 DIAGNOSIS — E78.5 HYPERLIPIDEMIA: ICD-10-CM

## 2018-10-26 ENCOUNTER — OFFICE VISIT - HEALTHEAST (OUTPATIENT)
Dept: FAMILY MEDICINE | Facility: CLINIC | Age: 73
End: 2018-10-26

## 2018-10-26 DIAGNOSIS — H10.33 ACUTE BACTERIAL CONJUNCTIVITIS OF BOTH EYES: ICD-10-CM

## 2018-11-05 ENCOUNTER — OFFICE VISIT - HEALTHEAST (OUTPATIENT)
Dept: FAMILY MEDICINE | Facility: CLINIC | Age: 73
End: 2018-11-05

## 2018-11-05 ENCOUNTER — COMMUNICATION - HEALTHEAST (OUTPATIENT)
Dept: FAMILY MEDICINE | Facility: CLINIC | Age: 73
End: 2018-11-05

## 2018-11-05 DIAGNOSIS — H01.003 BLEPHARITIS OF BOTH EYES: ICD-10-CM

## 2018-11-05 DIAGNOSIS — H01.006 BLEPHARITIS OF BOTH EYES: ICD-10-CM

## 2018-11-08 ENCOUNTER — OFFICE VISIT - HEALTHEAST (OUTPATIENT)
Dept: FAMILY MEDICINE | Facility: CLINIC | Age: 73
End: 2018-11-08

## 2018-11-08 DIAGNOSIS — E55.9 VITAMIN D DEFICIENCY: ICD-10-CM

## 2018-11-08 DIAGNOSIS — E53.8 VITAMIN B12 DEFICIENCY: ICD-10-CM

## 2018-11-08 DIAGNOSIS — N39.41 URGE INCONTINENCE: ICD-10-CM

## 2018-11-08 DIAGNOSIS — M81.0 OSTEOPOROSIS: ICD-10-CM

## 2018-11-08 DIAGNOSIS — Z23 FLU VACCINE NEED: ICD-10-CM

## 2018-11-08 DIAGNOSIS — J30.89 ENVIRONMENTAL AND SEASONAL ALLERGIES: ICD-10-CM

## 2018-11-08 DIAGNOSIS — Z12.31 VISIT FOR SCREENING MAMMOGRAM: ICD-10-CM

## 2018-11-08 DIAGNOSIS — Z00.00 ROUTINE GENERAL MEDICAL EXAMINATION AT A HEALTH CARE FACILITY: ICD-10-CM

## 2018-11-08 DIAGNOSIS — E78.5 HYPERLIPIDEMIA: ICD-10-CM

## 2018-11-08 DIAGNOSIS — I10 ESSENTIAL HYPERTENSION: ICD-10-CM

## 2018-11-08 DIAGNOSIS — E03.9 HYPOTHYROIDISM: ICD-10-CM

## 2018-11-08 LAB
ALBUMIN SERPL-MCNC: 3.6 G/DL (ref 3.5–5)
ALP SERPL-CCNC: 58 U/L (ref 45–120)
ALT SERPL W P-5'-P-CCNC: 22 U/L (ref 0–45)
ANION GAP SERPL CALCULATED.3IONS-SCNC: 12 MMOL/L (ref 5–18)
AST SERPL W P-5'-P-CCNC: 25 U/L (ref 0–40)
BILIRUB SERPL-MCNC: 0.3 MG/DL (ref 0–1)
BUN SERPL-MCNC: 15 MG/DL (ref 8–28)
CALCIUM SERPL-MCNC: 9.1 MG/DL (ref 8.5–10.5)
CHLORIDE BLD-SCNC: 109 MMOL/L (ref 98–107)
CO2 SERPL-SCNC: 22 MMOL/L (ref 22–31)
CREAT SERPL-MCNC: 0.61 MG/DL (ref 0.6–1.1)
GFR SERPL CREATININE-BSD FRML MDRD: >60 ML/MIN/1.73M2
GLUCOSE BLD-MCNC: 86 MG/DL (ref 70–125)
POTASSIUM BLD-SCNC: 4.4 MMOL/L (ref 3.5–5)
PROT SERPL-MCNC: 6.8 G/DL (ref 6–8)
SODIUM SERPL-SCNC: 143 MMOL/L (ref 136–145)
TSH SERPL DL<=0.005 MIU/L-ACNC: 1.52 UIU/ML (ref 0.3–5)

## 2018-11-08 ASSESSMENT — MIFFLIN-ST. JEOR: SCORE: 1197.72

## 2018-11-12 ENCOUNTER — RECORDS - HEALTHEAST (OUTPATIENT)
Dept: ADMINISTRATIVE | Facility: OTHER | Age: 73
End: 2018-11-12

## 2018-12-11 ENCOUNTER — AMBULATORY - HEALTHEAST (OUTPATIENT)
Dept: FAMILY MEDICINE | Facility: CLINIC | Age: 73
End: 2018-12-11

## 2019-01-02 ENCOUNTER — COMMUNICATION - HEALTHEAST (OUTPATIENT)
Dept: FAMILY MEDICINE | Facility: CLINIC | Age: 74
End: 2019-01-02

## 2019-01-02 DIAGNOSIS — E53.8 VITAMIN B12 DEFICIENCY: ICD-10-CM

## 2019-05-08 ENCOUNTER — RECORDS - HEALTHEAST (OUTPATIENT)
Dept: BONE DENSITY | Facility: CLINIC | Age: 74
End: 2019-05-08

## 2019-05-08 ENCOUNTER — RECORDS - HEALTHEAST (OUTPATIENT)
Dept: ADMINISTRATIVE | Facility: OTHER | Age: 74
End: 2019-05-08

## 2019-05-08 DIAGNOSIS — M81.0 AGE-RELATED OSTEOPOROSIS WITHOUT CURRENT PATHOLOGICAL FRACTURE: ICD-10-CM

## 2019-05-23 ENCOUNTER — RECORDS - HEALTHEAST (OUTPATIENT)
Dept: ADMINISTRATIVE | Facility: OTHER | Age: 74
End: 2019-05-23

## 2019-05-24 ENCOUNTER — COMMUNICATION - HEALTHEAST (OUTPATIENT)
Dept: FAMILY MEDICINE | Facility: CLINIC | Age: 74
End: 2019-05-24

## 2019-05-24 DIAGNOSIS — R09.89 BILATERAL CAROTID BRUITS: ICD-10-CM

## 2019-06-04 ENCOUNTER — RECORDS - HEALTHEAST (OUTPATIENT)
Dept: ADMINISTRATIVE | Facility: OTHER | Age: 74
End: 2019-06-04

## 2019-06-10 ENCOUNTER — NURSING HOME VISIT (OUTPATIENT)
Dept: GERIATRICS | Facility: CLINIC | Age: 74
End: 2019-06-10
Payer: MEDICARE

## 2019-06-10 VITALS
HEIGHT: 60 IN | DIASTOLIC BLOOD PRESSURE: 91 MMHG | SYSTOLIC BLOOD PRESSURE: 154 MMHG | HEART RATE: 72 BPM | OXYGEN SATURATION: 95 % | BODY MASS INDEX: 31.57 KG/M2 | TEMPERATURE: 98.5 F | RESPIRATION RATE: 16 BRPM | WEIGHT: 160.8 LBS

## 2019-06-10 VITALS
RESPIRATION RATE: 16 BRPM | HEART RATE: 61 BPM | WEIGHT: 160.2 LBS | DIASTOLIC BLOOD PRESSURE: 82 MMHG | OXYGEN SATURATION: 95 % | HEIGHT: 60 IN | BODY MASS INDEX: 31.45 KG/M2 | TEMPERATURE: 98.5 F | SYSTOLIC BLOOD PRESSURE: 135 MMHG

## 2019-06-10 DIAGNOSIS — E78.5 HYPERLIPIDEMIA, UNSPECIFIED HYPERLIPIDEMIA TYPE: ICD-10-CM

## 2019-06-10 DIAGNOSIS — R53.81 PHYSICAL DECONDITIONING: ICD-10-CM

## 2019-06-10 DIAGNOSIS — I10 ESSENTIAL HYPERTENSION: ICD-10-CM

## 2019-06-10 DIAGNOSIS — M16.12 PRIMARY OSTEOARTHRITIS OF LEFT HIP: Primary | ICD-10-CM

## 2019-06-10 DIAGNOSIS — M81.0 OSTEOPOROSIS, UNSPECIFIED OSTEOPOROSIS TYPE, UNSPECIFIED PATHOLOGICAL FRACTURE PRESENCE: ICD-10-CM

## 2019-06-10 DIAGNOSIS — E03.9 HYPOTHYROIDISM, UNSPECIFIED TYPE: ICD-10-CM

## 2019-06-10 DIAGNOSIS — D62 ACUTE BLOOD LOSS ANEMIA: ICD-10-CM

## 2019-06-10 DIAGNOSIS — Z86.718 HISTORY OF EMBOLISM: ICD-10-CM

## 2019-06-10 PROBLEM — R11.2 POST-OPERATIVE NAUSEA AND VOMITING: Status: ACTIVE | Noted: 2019-05-23

## 2019-06-10 PROBLEM — Z96.659 KNEE JOINT REPLACED BY OTHER MEANS: Status: ACTIVE | Noted: 2018-05-14

## 2019-06-10 PROBLEM — Z98.890 POST-OPERATIVE NAUSEA AND VOMITING: Status: ACTIVE | Noted: 2019-05-23

## 2019-06-10 PROCEDURE — 99310 SBSQ NF CARE HIGH MDM 45: CPT | Performed by: NURSE PRACTITIONER

## 2019-06-10 RX ORDER — FLUTICASONE PROPIONATE 50 MCG
2 SPRAY, SUSPENSION (ML) NASAL DAILY
COMMUNITY
End: 2022-06-09

## 2019-06-10 RX ORDER — CYANOCOBALAMIN 1000 UG/ML
1 INJECTION, SOLUTION INTRAMUSCULAR; SUBCUTANEOUS
COMMUNITY
End: 2021-12-17

## 2019-06-10 RX ORDER — OXYBUTYNIN CHLORIDE 5 MG/1
5 TABLET, EXTENDED RELEASE ORAL 2 TIMES DAILY
COMMUNITY
End: 2022-05-02

## 2019-06-10 RX ORDER — ASPIRIN 81 MG/1
81 TABLET ORAL DAILY
COMMUNITY

## 2019-06-10 ASSESSMENT — MIFFLIN-ST. JEOR
SCORE: 1158.16
SCORE: 1160.88

## 2019-06-10 NOTE — PROGRESS NOTES
[place] [facility]  PRIMARY CARE PROVIDER AND CLINIC:  Katia Kenny, [address] [address]  Chief Complaint   Patient presents with     Hospital F/U     [place] Medical Record Number:  [id number]  Place of Service where encounter took place:  [facility]  (S) [[id number]]    Claudia Powers  is a 74 year old  (1945), admitted to the above facility from  [facility]. Hospital stay 6/4/19 through 6/8/19..  Admitted to this facility for  rehab, medical management and nursing care.    HPI:    HPI information obtained from: facility chart records, facility staff, patient report and [facility] chart review.   Brief Summary of Hospital Course:     74 y.o female with PMH osteoarthritis, osteoporosis, HTN, HLD, hypothyroidism, history of DVT admitted to hospital as above for elective left total hip arthroplasty. Some mild postop anemia with HGB drop 12.5 --> 9.8. Admitted to TCU for acute rehab.    Updates on Status Since Skilled nursing Admission:   Per nursing patient has been having some elevated BPs in TCU. These are reviewed in PCC.    Patient is found in room. Is lying in bed. Alert, calm, NAD. Denies SOB, chest pain, dizziness. States is aware BP has been intermittently elevated and denies noting associated s/s. Reports BP has been well controlled prior to surgery. Reports pain to left hip mild and has only been utilizing acetaminophen to manage.Reports appetite is good and denies n/v or trouble with bowel or bladder.       Patient's living condition: lives alone  ALLERGIES: Adhesive tape; Banana; Bees; Lactose; Ondansetron; Pentazocine; Raspberry; Strawberry; and Talwin nx [pentazocine-naloxone]  PAST MEDICAL HISTORY:  has no past medical history on file.  PAST SURGICAL HISTORY:   has no past surgical history on file.  FAMILY HISTORY: family history is not on file.  SOCIAL HISTORY:       Post Discharge Medication Reconciliation Status: discharge  medications reconciled and changed, per note/orders (see AVS)    Current Outpatient Medications   Medication Sig Dispense Refill     ACETAMINOPHEN PO Take 1,000 mg by mouth every 6 hours as needed for pain        aspirin 81 MG EC tablet Take 81 mg by mouth daily       calcium carbonate (OS-MARCEL) 1500 (600 Ca) MG tablet Take 1,200 mg by mouth daily       cyanocobalamin (CYANOCOBALAMIN) 1000 MCG/ML injection Inject 1 mL into the muscle every 14 days       enoxaparin (LOVENOX) 40 MG/0.4ML syringe Inject 40 mg Subcutaneous daily       EPINEPHrine (EPIPEN) 0.3 MG/0.3ML injection Inject 0.3 mg into the muscle once as needed for anaphylaxis       fluticasone (FLONASE) 50 MCG/ACT nasal spray Spray 2 sprays into both nostrils daily       LEVOTHYROXINE SODIUM PO Take 75 mcg by mouth daily       METOPROLOL SUCCINATE ER PO Take 100 mg by mouth daily        oxybutynin ER (DITROPAN-XL) 5 MG 24 hr tablet Take 5 mg by mouth 2 times daily       OXYCODONE HCL PO Take 5-10 mg by mouth every 3 hours as needed        polyethylene glycol (MIRALAX/GLYCOLAX) powder Take 17 g by mouth daily as needed        rivaroxaban ANTICOAGULANT (XARELTO) 10 MG TABS tablet Take 10 mg by mouth daily       SIMVASTATIN PO Take 40 mg by mouth At Bedtime       TRAMADOL HCL PO Take 50 mg by mouth every 6 hours as needed for moderate to severe pain        vitamin D (ERGOCALCIFEROL) 71337 UNIT capsule Take 50,000 Units by mouth daily Take every Mon, Fri       calcium-vitamin D (CALTRATE) 600-400 MG-UNIT per tablet Take 2 tablets by mouth daily       denosumab (PROLIA) 60 MG/ML SOLN injection Inject 60 mg Subcutaneous every 6 months Due December 2017       mometasone (NASONEX) 50 MCG/ACT spray Spray 2 sprays into both nostrils daily       oxybutynin (DITROPAN-XL) 10 MG 24 hr tablet Take 10 mg by mouth daily       senna-docusate (SENOKOT-S;PERICOLACE) 8.6-50 MG per tablet Take 1 tablet by mouth daily Hold for loose stools.          ROS:  10 point ROS of systems  including Constitutional, Eyes, Respiratory, Cardiovascular, Gastroenterology, Genitourinary, Integumentary, Musculoskeletal, Psychiatric were all negative except for pertinent positives noted in my HPI.    Vitals:  BP (!) 154/91   Pulse 72   Temp 98.5  F (36.9  C)   Resp 16   Ht 1.524 m (5')   Wt 72.9 kg (160 lb 12.8 oz)   SpO2 95%   BMI 31.40 kg/m    Exam:  GENERAL APPEARANCE: Alert, in no distress   ENT: Mouth and posterior oropharynx normal, moist mucous membranes   EYES: EOM, conjunctivae, lids, pupils and irises normal   NECK: No adenopathy,masses or thyromegaly   RESP: respiratory effort and palpation of chest normal, Lungs clear to auscultation  CV: Palpation and auscultation of heart done , regular rate and rhythm, no murmur, rub, or gallop, peripheral edema trace bilateral LE edema  ABDOMEN: normal bowel sounds, soft, nontender, no hepatosplenomegaly or other masses   : palpation of bladder WNL   M/S: Gait and station normal   Digits and nails normal - BLACKMAN  SKIN: Inspection of skin and subcutaneous tissue baseline, Palpation of skin and subcutaneous tissue baseline, incision to left hip closed with adhesive- well approximated, slight pink discoloration surrounding area, no pain or warmth noted.   NEURO: Cranial nerves 2-12 are normal tested and grossly at patient's baseline, Examination of sensation by touch normal   PSYCH: oriented X 3, affect and mood normal             Lab/Diagnostic data:  Recent labs in [facility] reviewed by me today.     ASSESSMENT/PLAN:  Primary osteoarthritis of left hip  S/p LTHA 6/4 at [facility]  - WBAT  - keep surgical incision clean and dry- pablo slight pink discoloration and monitor healing  - PT/OT  - continue acetaminophen and prn oxycodone for pain and taper off  - f/w ortho 3 months as directed    History of embolism  - is on Lovenox x 14 days then Xarelto until 7/5 for DVT prophylaxis    Essential hypertension  - intermittent elevations, BP is actually variable- is  postop and in acute rehab center, mx stressors/aggravating factors and no noted associated s/s  - at this point will continue on metoprolol home dose and monitor   - VS per unit protocol    Hyperlipidemia, unspecified hyperlipidemia type  - chronic  - continue on Simvastatin    Hypothyroidism, unspecified type  - chronic  - continue on levothyroxine    Osteoporosis  - chronic  - continues on Ca/D and Prolia    Acute blood loss anemia  - HGB 12.5 --9.8 postop  - observe for s/s bleeding  - recheck CBC    Physical deconditioning  2/2 above  - lives alone in handicap accessible town [place]  - PT/OT  - ongoing discharge planning, SW follow and care conferences per unit protocol           Orders written by provider at facility        Electronically signed by:  KAIDEN West CNP

## 2019-06-10 NOTE — LETTER
6/10/2019        RE: Claudia Powers  [address]  [place] [address]        [facility]  PRIMARY CARE PROVIDER AND CLINIC:  Katia Kenny, [facility] [address] [place] [id number]  Chief Complaint   Patient presents with     Hospital F/U     [facility] Record Number:  [id number]  Place of Service where encounter took place:  [facility]  (S) [[id number]]    Claudia Powers  is a 74 year old  (1945), admitted to the above facility from  [facility]. Hospital stay 6/4/19 through 6/8/19..  Admitted to this facility for  rehab, medical management and nursing care.    HPI:    HPI information obtained from: facility chart records, facility staff, patient report and [facility] chart review.   Brief Summary of Hospital Course:     74 y.o female with PMH osteoarthritis, osteoporosis, HTN, HLD, hypothyroidism, history of DVT admitted to hospital as above for elective left total hip arthroplasty. Some mild postop anemia with HGB drop 12.5 --> 9.8. Admitted to TCU for acute rehab.    Updates on Status Since Skilled nursing Admission:   Per nursing patient has been having some elevated BPs in TCU. These are reviewed in PCC.    Patient is found in room. Is lying in bed. Alert, calm, NAD. Denies SOB, chest pain, dizziness. States is aware BP has been intermittently elevated and denies noting associated s/s. Reports BP has been well controlled prior to surgery. Reports pain to left hip mild and has only been utilizing acetaminophen to manage.Reports appetite is good and denies n/v or trouble with bowel or bladder.       Patient's living condition: lives alone  ALLERGIES: Adhesive tape; Banana; Bees; Lactose; Ondansetron; Pentazocine; Raspberry; Strawberry; and Talwin nx [pentazocine-naloxone]  PAST MEDICAL HISTORY:  has no past medical history on file.  PAST SURGICAL HISTORY:   has no past surgical history on file.  FAMILY HISTORY: family history is not on  file.  SOCIAL HISTORY:       Post Discharge Medication Reconciliation Status: discharge medications reconciled and changed, per note/orders (see AVS)    Current Outpatient Medications   Medication Sig Dispense Refill     ACETAMINOPHEN PO Take 1,000 mg by mouth every 6 hours as needed for pain        aspirin 81 MG EC tablet Take 81 mg by mouth daily       calcium carbonate (OS-MARCEL) 1500 (600 Ca) MG tablet Take 1,200 mg by mouth daily       cyanocobalamin (CYANOCOBALAMIN) 1000 MCG/ML injection Inject 1 mL into the muscle every 14 days       enoxaparin (LOVENOX) 40 MG/0.4ML syringe Inject 40 mg Subcutaneous daily       EPINEPHrine (EPIPEN) 0.3 MG/0.3ML injection Inject 0.3 mg into the muscle once as needed for anaphylaxis       fluticasone (FLONASE) 50 MCG/ACT nasal spray Spray 2 sprays into both nostrils daily       LEVOTHYROXINE SODIUM PO Take 75 mcg by mouth daily       METOPROLOL SUCCINATE ER PO Take 100 mg by mouth daily        oxybutynin ER (DITROPAN-XL) 5 MG 24 hr tablet Take 5 mg by mouth 2 times daily       OXYCODONE HCL PO Take 5-10 mg by mouth every 3 hours as needed        polyethylene glycol (MIRALAX/GLYCOLAX) powder Take 17 g by mouth daily as needed        rivaroxaban ANTICOAGULANT (XARELTO) 10 MG TABS tablet Take 10 mg by mouth daily       SIMVASTATIN PO Take 40 mg by mouth At Bedtime       TRAMADOL HCL PO Take 50 mg by mouth every 6 hours as needed for moderate to severe pain        vitamin D (ERGOCALCIFEROL) 25512 UNIT capsule Take 50,000 Units by mouth daily Take every Mon, Fri       calcium-vitamin D (CALTRATE) 600-400 MG-UNIT per tablet Take 2 tablets by mouth daily       denosumab (PROLIA) 60 MG/ML SOLN injection Inject 60 mg Subcutaneous every 6 months Due December 2017       mometasone (NASONEX) 50 MCG/ACT spray Spray 2 sprays into both nostrils daily       oxybutynin (DITROPAN-XL) 10 MG 24 hr tablet Take 10 mg by mouth daily       senna-docusate (SENOKOT-S;PERICOLACE) 8.6-50 MG per tablet Take  1 tablet by mouth daily Hold for loose stools.          ROS:  10 point ROS of systems including Constitutional, Eyes, Respiratory, Cardiovascular, Gastroenterology, Genitourinary, Integumentary, Musculoskeletal, Psychiatric were all negative except for pertinent positives noted in my HPI.    Vitals:  BP (!) 154/91   Pulse 72   Temp 98.5  F (36.9  C)   Resp 16   Ht 1.524 m (5')   Wt 72.9 kg (160 lb 12.8 oz)   SpO2 95%   BMI 31.40 kg/m     Exam:  GENERAL APPEARANCE: Alert, in no distress   ENT: Mouth and posterior oropharynx normal, moist mucous membranes   EYES: EOM, conjunctivae, lids, pupils and irises normal   NECK: No adenopathy,masses or thyromegaly   RESP: respiratory effort and palpation of chest normal, Lungs clear to auscultation  CV: Palpation and auscultation of heart done , regular rate and rhythm, no murmur, rub, or gallop, peripheral edema trace bilateral LE edema  ABDOMEN: normal bowel sounds, soft, nontender, no hepatosplenomegaly or other masses   : palpation of bladder WNL   M/S: Gait and station normal   Digits and nails normal - BLACKMAN  SKIN: Inspection of skin and subcutaneous tissue baseline, Palpation of skin and subcutaneous tissue baseline, incision to left hip closed with adhesive- well approximated, slight pink discoloration surrounding area, no pain or warmth noted.   NEURO: Cranial nerves 2-12 are normal tested and grossly at patient's baseline, Examination of sensation by touch normal   PSYCH: oriented X 3, affect and mood normal             Lab/Diagnostic data:  Recent labs in [facility] reviewed by me today.     ASSESSMENT/PLAN:  Primary osteoarthritis of left hip  S/p LTHA 6/4 at [facility]  - WBAT  - keep surgical incision clean and dry- pablo slight pink discoloration and monitor healing  - PT/OT  - continue acetaminophen and prn oxycodone for pain and taper off  - f/w ortho 3 months as directed    History of embolism  - is on Lovenox x 14 days then Xarelto until 7/5 for DVT  prophylaxis    Essential hypertension  - intermittent elevations, BP is actually variable- is postop and in acute rehab center, mx stressors/aggravating factors and no noted associated s/s  - at this point will continue on metoprolol home dose and monitor   - VS per unit protocol    Hyperlipidemia, unspecified hyperlipidemia type  - chronic  - continue on Simvastatin    Hypothyroidism, unspecified type  - chronic  - continue on levothyroxine    Osteoporosis  - chronic  - continues on Ca/D and Prolia    Acute blood loss anemia  - HGB 12.5 --9.8 postop  - observe for s/s bleeding  - recheck CBC    Physical deconditioning  2/2 above  - lives alone in handicap accessible [facility]  - PT/OT  - ongoing discharge planning, SW follow and care conferences per unit protocol           Orders written by provider at facility        Electronically signed by:  KAIDEN West CNP                           Sincerely,        KAIDEN West CNP

## 2019-06-11 ENCOUNTER — NURSING HOME VISIT (OUTPATIENT)
Dept: GERIATRICS | Facility: CLINIC | Age: 74
End: 2019-06-11
Payer: MEDICARE

## 2019-06-11 ENCOUNTER — HOSPITAL LABORATORY (OUTPATIENT)
Dept: OTHER | Facility: CLINIC | Age: 74
End: 2019-06-11

## 2019-06-11 DIAGNOSIS — M16.12 PRIMARY OSTEOARTHRITIS OF LEFT HIP: Primary | ICD-10-CM

## 2019-06-11 DIAGNOSIS — I10 ESSENTIAL HYPERTENSION, BENIGN: ICD-10-CM

## 2019-06-11 DIAGNOSIS — R53.81 PHYSICAL DECONDITIONING: ICD-10-CM

## 2019-06-11 DIAGNOSIS — E03.9 HYPOTHYROIDISM, UNSPECIFIED TYPE: ICD-10-CM

## 2019-06-11 DIAGNOSIS — M81.0 OSTEOPOROSIS, UNSPECIFIED OSTEOPOROSIS TYPE, UNSPECIFIED PATHOLOGICAL FRACTURE PRESENCE: ICD-10-CM

## 2019-06-11 DIAGNOSIS — E78.5 HYPERLIPIDEMIA, UNSPECIFIED HYPERLIPIDEMIA TYPE: ICD-10-CM

## 2019-06-11 LAB
ANION GAP SERPL CALCULATED.3IONS-SCNC: 4 MMOL/L (ref 3–14)
BUN SERPL-MCNC: 14 MG/DL (ref 7–30)
CALCIUM SERPL-MCNC: 8.9 MG/DL (ref 8.5–10.1)
CHLORIDE SERPL-SCNC: 105 MMOL/L (ref 94–109)
CO2 SERPL-SCNC: 31 MMOL/L (ref 20–32)
CREAT SERPL-MCNC: 0.52 MG/DL (ref 0.52–1.04)
ERYTHROCYTE [DISTWIDTH] IN BLOOD BY AUTOMATED COUNT: 14.7 % (ref 10–15)
GFR SERPL CREATININE-BSD FRML MDRD: >90 ML/MIN/{1.73_M2}
GLUCOSE SERPL-MCNC: 105 MG/DL (ref 70–99)
HCT VFR BLD AUTO: 34.3 % (ref 35–47)
HGB BLD-MCNC: 11 G/DL (ref 11.7–15.7)
MCH RBC QN AUTO: 29.5 PG (ref 26.5–33)
MCHC RBC AUTO-ENTMCNC: 32.1 G/DL (ref 31.5–36.5)
MCV RBC AUTO: 92 FL (ref 78–100)
PLATELET # BLD AUTO: 294 10E9/L (ref 150–450)
POTASSIUM SERPL-SCNC: 3.5 MMOL/L (ref 3.4–5.3)
RBC # BLD AUTO: 3.73 10E12/L (ref 3.8–5.2)
SODIUM SERPL-SCNC: 140 MMOL/L (ref 133–144)
WBC # BLD AUTO: 7.8 10E9/L (ref 4–11)

## 2019-06-11 PROCEDURE — 99309 SBSQ NF CARE MODERATE MDM 30: CPT | Performed by: INTERNAL MEDICINE

## 2019-06-11 NOTE — PROGRESS NOTES
PRIMARY CARE PROVIDER AND CLINIC RESPONSIBLE:  Katia Kenny, [facility] [address] [facility] [id number]        ADMISSION HISTORY AND PHYSICAL EXAMINATION     Chief Complaint   Patient presents with     Hospital F/U         HISTORY OF PRESENT ILLNESS:  74 year old female, (1945), admitted to the [facility] TCU for continuation of medical care and rehab.    Pt admitted [facility] 6/4 to 6/8 for L MU.    Pt denies any constipation. Using tylenol for pain. No fevers/chills/chest pain/SOB. Likely going home on Saturday.    Please see Enriqueta Nguyen's CNP admit noted dated 6/10 for details of admission, past medical history, family history, allergies, medication list, social history and other details pertinent with this admission. Hospital admission and dc summary reviewed.      No past medical history on file.    No past surgical history on file.    Current Outpatient Medications   Medication Sig     ACETAMINOPHEN PO Take 1,000 mg by mouth every 6 hours as needed for pain      aspirin 81 MG EC tablet Take 81 mg by mouth daily     calcium carbonate (OS-MARCEL) 1500 (600 Ca) MG tablet Take 1,200 mg by mouth daily     cyanocobalamin (CYANOCOBALAMIN) 1000 MCG/ML injection Inject 1 mL into the muscle every 14 days     denosumab (PROLIA) 60 MG/ML SOLN injection Inject 60 mg Subcutaneous every 6 months Due December 2017     enoxaparin (LOVENOX) 40 MG/0.4ML syringe Inject 40 mg Subcutaneous daily     EPINEPHrine (EPIPEN) 0.3 MG/0.3ML injection Inject 0.3 mg into the muscle once as needed for anaphylaxis     fluticasone (FLONASE) 50 MCG/ACT nasal spray Spray 2 sprays into both nostrils daily     LEVOTHYROXINE SODIUM PO Take 75 mcg by mouth daily     METOPROLOL SUCCINATE ER PO Take 100 mg by mouth daily      oxybutynin (DITROPAN-XL) 10 MG 24 hr tablet Take 5 mg by mouth daily      oxybutynin ER (DITROPAN-XL) 5 MG 24 hr tablet Take 5 mg by mouth 2 times daily     OXYCODONE HCL PO Take 5-10 mg by mouth every 4  hours as needed      polyethylene glycol (MIRALAX/GLYCOLAX) powder Take 17 g by mouth daily as needed      rivaroxaban ANTICOAGULANT (XARELTO) 10 MG TABS tablet Take 10 mg by mouth daily     SIMVASTATIN PO Take 40 mg by mouth At Bedtime     TRAMADOL HCL PO Take 50 mg by mouth every 6 hours as needed for moderate to severe pain      vitamin D (ERGOCALCIFEROL) 37138 UNIT capsule Take 50,000 Units by mouth daily Take every Mon, Fri     calcium-vitamin D (CALTRATE) 600-400 MG-UNIT per tablet Take 2 tablets by mouth daily     mometasone (NASONEX) 50 MCG/ACT spray Spray 2 sprays into both nostrils daily     senna-docusate (SENOKOT-S;PERICOLACE) 8.6-50 MG per tablet Take 1 tablet by mouth daily Hold for loose stools.      No current facility-administered medications for this visit.        Allergies   Allergen Reactions     Adhesive Tape      Banana      Bees      Lactose      Ondansetron      Pentazocine      Raspberry      Strawberry      Talwin Nx [Pentazocine-Naloxone]        Social History     Socioeconomic History     Marital status: Single     Spouse name: Not on file     Number of children: Not on file     Years of education: Not on file     Highest education level: Not on file   Occupational History     Not on file   Social Needs     Financial resource strain: Not on file     Food insecurity:     Worry: Not on file     Inability: Not on file     Transportation needs:     Medical: Not on file     Non-medical: Not on file   Tobacco Use     Smoking status: Not on file   Substance and Sexual Activity     Alcohol use: Not on file     Drug use: Not on file     Sexual activity: Not on file   Lifestyle     Physical activity:     Days per week: Not on file     Minutes per session: Not on file     Stress: Not on file   Relationships     Social connections:     Talks on phone: Not on file     Gets together: Not on file     Attends Yazdanism service: Not on file     Active member of club or organization: Not on file      Attends meetings of clubs or organizations: Not on file     Relationship status: Not on file     Intimate partner violence:     Fear of current or ex partner: Not on file     Emotionally abused: Not on file     Physically abused: Not on file     Forced sexual activity: Not on file   Other Topics Concern     Not on file   Social History Narrative     Not on file          Information reviewed:  Medications, vital signs, orders, nursing notes, problem list, hospital information.     ROS: All 10 point review of system completed, those pertinent positive, please see H&P, the remaining ROS is negative.    /82   Pulse 61   Temp 98.5  F (36.9  C)   Resp 16   Ht 1.524 m (5')   Wt 72.7 kg (160 lb 3.2 oz)   SpO2 95%   BMI 31.29 kg/m      PHYSICAL EXAMINATION:   GENERAL:  No acute distress. Laying in bed.  SKIN:  Dry and warm.  There is no rash, lesions, ulcers or juandice at area of skin examined.  HEENT:  Head without trauma.  Pupils round, reactive. Exam of conjunctiva and lids are normal. Sclera without icterus. There is no oral thrush.  NECK:  Supple.  There is no cervical adenopathy, no thyromegaly. No jugular venous distension.  CHEST: No reproducible chest tenderness.   LUNGS:  Normal respiratory effort. Lungs are Clear on ascultation.  HEART:  Regular rate and rhythm.  No murmur, gallops or rubs auscultated.  ABDOMEN:  Soft, bowel sounds positive.  There is no tenderness or guarding.   EXTREMITIES: No edema.   NEUROLOGIC:  Alert and oriented x3.      Lab/Diagnostic data:  Reviewed    Lab Results   Component Value Date    WBC 7.8 06/11/2019     Lab Results   Component Value Date    RBC 3.73 06/11/2019     Lab Results   Component Value Date    HGB 11.0 06/11/2019     Lab Results   Component Value Date    HCT 34.3 06/11/2019     Lab Results   Component Value Date    MCV 92 06/11/2019     Lab Results   Component Value Date    MCH 29.5 06/11/2019     Lab Results   Component Value Date    MCHC 32.1 06/11/2019      Lab Results   Component Value Date    RDW 14.7 06/11/2019     Lab Results   Component Value Date     06/11/2019       Last Comprehensive Metabolic Panel:  Sodium   Date Value Ref Range Status   06/11/2019 140 133 - 144 mmol/L Final     Potassium   Date Value Ref Range Status   06/11/2019 3.5 3.4 - 5.3 mmol/L Final     Chloride   Date Value Ref Range Status   06/11/2019 105 94 - 109 mmol/L Final     Carbon Dioxide   Date Value Ref Range Status   06/11/2019 31 20 - 32 mmol/L Final     Anion Gap   Date Value Ref Range Status   06/11/2019 4 3 - 14 mmol/L Final     Glucose   Date Value Ref Range Status   06/11/2019 105 (H) 70 - 99 mg/dL Final     Urea Nitrogen   Date Value Ref Range Status   06/11/2019 14 7 - 30 mg/dL Final     Creatinine   Date Value Ref Range Status   06/11/2019 0.52 0.52 - 1.04 mg/dL Final     GFR Estimate   Date Value Ref Range Status   06/11/2019 >90 >60 mL/min/[1.73_m2] Final     Comment:     Non  GFR Calc  Starting 12/18/2018, serum creatinine based estimated GFR (eGFR) will be   calculated using the Chronic Kidney Disease Epidemiology Collaboration   (CKD-EPI) equation.       Calcium   Date Value Ref Range Status   06/11/2019 8.9 8.5 - 10.1 mg/dL Final       ASSESSMENT / PLAN:     Primary osteoarthritis of left hip  - s/p L MU.  - Pain control.  - On lovenox, then xarelto for DVT ppx.  - f/u with ortho as scheduled.    Essential hypertension, benign  - on metoprolol.    Osteoporosis, unspecified osteoporosis type, unspecified pathological fracture presence  - on prolia.    Hypothyroidism, unspecified type  - on synthroid.    Hyperlipidemia, unspecified hyperlipidemia type  - on zocor.    Overactive bladder.  - On ditropan XL.    Physical deconditioning  -Plan: PT/OT, fall precautions. Care conference with patient and family for the progress of rehab and disposition issues will be discussed as planned. Rehab evaluation and other evaluations including CPT are at rehab  logs, to be reviewed separately.  Fall risk assessment as well as cognitive evaluation will be formed during rehab stay if indicated.      Other problems with same care. Primary care doctor and other specialists to address those chronic problems in next clinic appointment to be scheduled upon discharge from the TCU.    Total time spent with patient visit was 32 min including patient visit, review of past records, 1/2 time on patients counseling and coordinating care.        Jordan Cartagena MD

## 2019-06-11 NOTE — LETTER
6/11/2019        RE: Claudia Mata Priya  [address]  [place] [address]          PRIMARY CARE PROVIDER AND CLINIC RESPONSIBLE:  Katia Kenny, [facility] [address] [place] [id number]        ADMISSION HISTORY AND PHYSICAL EXAMINATION     Chief Complaint   Patient presents with     Hospital F/U         HISTORY OF PRESENT ILLNESS:  74 year old female, (1945[facility]    Pt admitted [facility] 6/4 to 6/8 for L MU.    Pt denies any constipation. Using tylenol for pain. No fevers/chills/chest pain/SOB. Likely going home on Saturday.    Please see Enriqueta Nguyen's CNP admit noted dated 6/10 for details of admission, past medical history, family history, allergies, medication list, social history and other details pertinent with this admission. Hospital admission and dc summary reviewed.      No past medical history on file.    No past surgical history on file.    Current Outpatient Medications   Medication Sig     ACETAMINOPHEN PO Take 1,000 mg by mouth every 6 hours as needed for pain      aspirin 81 MG EC tablet Take 81 mg by mouth daily     calcium carbonate (OS-MARCEL) 1500 (600 Ca) MG tablet Take 1,200 mg by mouth daily     cyanocobalamin (CYANOCOBALAMIN) 1000 MCG/ML injection Inject 1 mL into the muscle every 14 days     denosumab (PROLIA) 60 MG/ML SOLN injection Inject 60 mg Subcutaneous every 6 months Due December 2017     enoxaparin (LOVENOX) 40 MG/0.4ML syringe Inject 40 mg Subcutaneous daily     EPINEPHrine (EPIPEN) 0.3 MG/0.3ML injection Inject 0.3 mg into the muscle once as needed for anaphylaxis     fluticasone (FLONASE) 50 MCG/ACT nasal spray Spray 2 sprays into both nostrils daily     LEVOTHYROXINE SODIUM PO Take 75 mcg by mouth daily     METOPROLOL SUCCINATE ER PO Take 100 mg by mouth daily      oxybutynin (DITROPAN-XL) 10 MG 24 hr tablet Take 5 mg by mouth daily      oxybutynin ER (DITROPAN-XL) 5 MG 24 hr tablet  Take 5 mg by mouth 2 times daily     OXYCODONE HCL PO Take 5-10 mg by mouth every 4 hours as needed      polyethylene glycol (MIRALAX/GLYCOLAX) powder Take 17 g by mouth daily as needed      rivaroxaban ANTICOAGULANT (XARELTO) 10 MG TABS tablet Take 10 mg by mouth daily     SIMVASTATIN PO Take 40 mg by mouth At Bedtime     TRAMADOL HCL PO Take 50 mg by mouth every 6 hours as needed for moderate to severe pain      vitamin D (ERGOCALCIFEROL) 18357 UNIT capsule Take 50,000 Units by mouth daily Take every Mon, Fri     calcium-vitamin D (CALTRATE) 600-400 MG-UNIT per tablet Take 2 tablets by mouth daily     mometasone (NASONEX) 50 MCG/ACT spray Spray 2 sprays into both nostrils daily     senna-docusate (SENOKOT-S;PERICOLACE) 8.6-50 MG per tablet Take 1 tablet by mouth daily Hold for loose stools.      No current facility-administered medications for this visit.        Allergies   Allergen Reactions     Adhesive Tape      Banana      Bees      Lactose      Ondansetron      Pentazocine      Raspberry      Strawberry      Talwin Nx [Pentazocine-Naloxone]        Social History     Socioeconomic History     Marital status: Single     Spouse name: Not on file     Number of children: Not on file     Years of education: Not on file     Highest education level: Not on file   Occupational History     Not on file   Social Needs     Financial resource strain: Not on file     Food insecurity:     Worry: Not on file     Inability: Not on file     Transportation needs:     Medical: Not on file     Non-medical: Not on file   Tobacco Use     Smoking status: Not on file   Substance and Sexual Activity     Alcohol use: Not on file     Drug use: Not on file     Sexual activity: Not on file   Lifestyle     Physical activity:     Days per week: Not on file     Minutes per session: Not on file     Stress: Not on file   Relationships     Social connections:     Talks on phone: Not on file     Gets together: Not on file     Attends Episcopalian  service: Not on file     Active member of club or organization: Not on file     Attends meetings of clubs or organizations: Not on file     Relationship status: Not on file     Intimate partner violence:     Fear of current or ex partner: Not on file     Emotionally abused: Not on file     Physically abused: Not on file     Forced sexual activity: Not on file   Other Topics Concern     Not on file   Social History Narrative     Not on file          Information reviewed:  Medications, vital signs, orders, nursing notes, problem list, hospital information.     ROS: All 10 point review of system completed, those pertinent positive, please see H&P, the remaining ROS is negative.    /82   Pulse 61   Temp 98.5  F (36.9  C)   Resp 16   Ht 1.524 m (5')   Wt 72.7 kg (160 lb 3.2 oz)   SpO2 95%   BMI 31.29 kg/m       PHYSICAL EXAMINATION:   GENERAL:  No acute distress. Laying in bed.  SKIN:  Dry and warm.  There is no rash, lesions, ulcers or juandice at area of skin examined.  HEENT:  Head without trauma.  Pupils round, reactive. Exam of conjunctiva and lids are normal. Sclera without icterus. There is no oral thrush.  NECK:  Supple.  There is no cervical adenopathy, no thyromegaly. No jugular venous distension.  CHEST: No reproducible chest tenderness.   LUNGS:  Normal respiratory effort. Lungs are Clear on ascultation.  HEART:  Regular rate and rhythm.  No murmur, gallops or rubs auscultated.  ABDOMEN:  Soft, bowel sounds positive.  There is no tenderness or guarding.   EXTREMITIES: No edema.   NEUROLOGIC:  Alert and oriented x3.      Lab/Diagnostic data:  Reviewed    Lab Results   Component Value Date    WBC 7.8 06/11/2019     Lab Results   Component Value Date    RBC 3.73 06/11/2019     Lab Results   Component Value Date    HGB 11.0 06/11/2019     Lab Results   Component Value Date    HCT 34.3 06/11/2019     Lab Results   Component Value Date    MCV 92 06/11/2019     Lab Results   Component Value Date    MCH  29.5 06/11/2019     Lab Results   Component Value Date    MCHC 32.1 06/11/2019     Lab Results   Component Value Date    RDW 14.7 06/11/2019     Lab Results   Component Value Date     06/11/2019       Last Comprehensive Metabolic Panel:  Sodium   Date Value Ref Range Status   06/11/2019 140 133 - 144 mmol/L Final     Potassium   Date Value Ref Range Status   06/11/2019 3.5 3.4 - 5.3 mmol/L Final     Chloride   Date Value Ref Range Status   06/11/2019 105 94 - 109 mmol/L Final     Carbon Dioxide   Date Value Ref Range Status   06/11/2019 31 20 - 32 mmol/L Final     Anion Gap   Date Value Ref Range Status   06/11/2019 4 3 - 14 mmol/L Final     Glucose   Date Value Ref Range Status   06/11/2019 105 (H) 70 - 99 mg/dL Final     Urea Nitrogen   Date Value Ref Range Status   06/11/2019 14 7 - 30 mg/dL Final     Creatinine   Date Value Ref Range Status   06/11/2019 0.52 0.52 - 1.04 mg/dL Final     GFR Estimate   Date Value Ref Range Status   06/11/2019 >90 >60 mL/min/[1.73_m2] Final     Comment:     Non  GFR Calc  Starting 12/18/2018, serum creatinine based estimated GFR (eGFR) will be   calculated using the Chronic Kidney Disease Epidemiology Collaboration   (CKD-EPI) equation.       Calcium   Date Value Ref Range Status   06/11/2019 8.9 8.5 - 10.1 mg/dL Final       ASSESSMENT / PLAN:     Primary osteoarthritis of left hip  - s/p L MU.  - Pain control.  - On lovenox, then xarelto for DVT ppx.  - f/u with ortho as scheduled.    Essential hypertension, benign  - on metoprolol.    Osteoporosis, unspecified osteoporosis type, unspecified pathological fracture presence  - on prolia.    Hypothyroidism, unspecified type  - on synthroid.    Hyperlipidemia, unspecified hyperlipidemia type  - on zocor.    Overactive bladder.  - On ditropan XL.    Physical deconditioning  -Plan: PT/OT, fall precautions. Care conference with patient and family for the progress of rehab and disposition issues will be discussed  as planned. Rehab evaluation and other evaluations including CPT are at rehab logs, to be reviewed separately.  Fall risk assessment as well as cognitive evaluation will be formed during rehab stay if indicated.      Other problems with same care. Primary care doctor and other specialists to address those chronic problems in next clinic appointment to be scheduled upon discharge from the TCU.    Total time spent with patient visit was 32 min including patient visit, review of past records, 1/2 time on patients counseling and coordinating care.        Jordan Cartagena MD      Sincerely,        Jordan Cartagena MD

## 2019-06-12 RX ORDER — TRAMADOL HYDROCHLORIDE 50 MG/1
50 TABLET ORAL EVERY 6 HOURS PRN
COMMUNITY
End: 2019-06-13

## 2019-06-12 RX ORDER — OXYCODONE HYDROCHLORIDE 5 MG/1
5-10 TABLET ORAL EVERY 4 HOURS PRN
Qty: 60 TABLET | Refills: 0 | Status: SHIPPED | OUTPATIENT
Start: 2019-06-12 | End: 2019-06-13

## 2019-06-12 ASSESSMENT — MIFFLIN-ST. JEOR: SCORE: 1158.16

## 2019-06-13 ENCOUNTER — DISCHARGE SUMMARY NURSING HOME (OUTPATIENT)
Dept: GERIATRICS | Facility: CLINIC | Age: 74
End: 2019-06-13
Payer: MEDICARE

## 2019-06-13 VITALS
SYSTOLIC BLOOD PRESSURE: 135 MMHG | HEIGHT: 60 IN | DIASTOLIC BLOOD PRESSURE: 73 MMHG | BODY MASS INDEX: 31.45 KG/M2 | TEMPERATURE: 98.7 F | RESPIRATION RATE: 16 BRPM | OXYGEN SATURATION: 94 % | WEIGHT: 160.2 LBS | HEART RATE: 86 BPM

## 2019-06-13 DIAGNOSIS — I10 ESSENTIAL HYPERTENSION, BENIGN: ICD-10-CM

## 2019-06-13 DIAGNOSIS — E78.5 HYPERLIPIDEMIA, UNSPECIFIED HYPERLIPIDEMIA TYPE: ICD-10-CM

## 2019-06-13 DIAGNOSIS — M16.12 PRIMARY OSTEOARTHRITIS OF LEFT HIP: Primary | ICD-10-CM

## 2019-06-13 DIAGNOSIS — E03.9 HYPOTHYROIDISM, UNSPECIFIED TYPE: ICD-10-CM

## 2019-06-13 DIAGNOSIS — R53.81 PHYSICAL DECONDITIONING: ICD-10-CM

## 2019-06-13 DIAGNOSIS — M81.0 OSTEOPOROSIS, UNSPECIFIED OSTEOPOROSIS TYPE, UNSPECIFIED PATHOLOGICAL FRACTURE PRESENCE: ICD-10-CM

## 2019-06-13 DIAGNOSIS — D62 ACUTE BLOOD LOSS ANEMIA: ICD-10-CM

## 2019-06-13 DIAGNOSIS — Z86.718 HISTORY OF EMBOLISM: ICD-10-CM

## 2019-06-13 PROBLEM — D12.6 TUBULAR ADENOMA OF COLON: Status: ACTIVE | Noted: 2019-06-13

## 2019-06-13 PROBLEM — Z91.030 BEE STING ALLERGY: Status: ACTIVE | Noted: 2017-11-02

## 2019-06-13 PROBLEM — R09.89 CAROTID BRUIT: Status: ACTIVE | Noted: 2019-05-23

## 2019-06-13 PROBLEM — N39.41 URGE INCONTINENCE OF URINE: Status: ACTIVE | Noted: 2019-06-13

## 2019-06-13 PROBLEM — M16.9 OSTEOARTHRITIS OF HIP: Status: ACTIVE | Noted: 2019-04-22

## 2019-06-13 PROBLEM — M19.90 OSTEOARTHROSIS: Status: ACTIVE | Noted: 2019-06-13

## 2019-06-13 PROBLEM — E73.9 INTESTINAL DISACCHARIDASE DEFICIENCIES AND DISACCHARIDE MALABSORPTION: Status: ACTIVE | Noted: 2019-06-13

## 2019-06-13 PROBLEM — Z96.649 HISTORY OF TOTAL HIP REPLACEMENT: Status: ACTIVE | Noted: 2018-05-14

## 2019-06-13 PROCEDURE — 99316 NF DSCHRG MGMT 30 MIN+: CPT | Performed by: NURSE PRACTITIONER

## 2019-06-13 RX ORDER — TRAMADOL HYDROCHLORIDE 50 MG/1
50 TABLET ORAL EVERY 6 HOURS PRN
Qty: 30 TABLET | Refills: 0 | Status: SHIPPED | OUTPATIENT
Start: 2019-06-13 | End: 2021-06-20

## 2019-06-13 NOTE — PROGRESS NOTES
[place] GERIATRIC SERVICES DISCHARGE SUMMARY  PATIENT'S NAME: Claudia Powers  YOB: 1945  MEDICAL RECORD NUMBER:  [id number]  Place of Service where encounter took place:  [facility]  ([facility]) [[id number]]    PRIMARY CARE PROVIDER AND CLINIC RESPONSIBLE AFTER TRANSFER:   Katia Kenny, [address] / [facility] [address]    Non-G Provider     Transferring providers: Sigrid Augustin NP, Jordan Cartagena MD  Recent Hospitalization/ED:  [facility] stay 6/4/19 through 6/8/19.  Date of SNF Admission: June / 08 / 2019  Date of SNF (anticipated) Discharge: [place] / 15 / 2019  Discharged to: with family to home  Cognitive Scores: SLUMS: 27/30  Physical Function: >200 ft with wheeled walker, CGA with transfers, moderate assist with lower body dressing, \  DME: has walker, cane and shower bench in the home    CODE STATUS/ADVANCE DIRECTIVES DISCUSSION:  DNR/DNI   ALLERGIES: Bee venom; Wasp venom protein; Banana; Bees; Food; Hornet venom; Lactase; Lactose; Ondansetron; Pentazocine; Pollen extract; Raspberry; Strawberry; Talwin nx [pentazocine-naloxone]; Adhesive tape; Avocado; and Other (do not use)    Primary osteoarthritis of left hip         DISCHARGE DIAGNOSIS/NURSING FACILITY COURSE:   Primary osteoarthritis of left hip  Physical deconditioning  S/p LTHA 6/4 at [facility]. WBAT. Will go home with PT/OT/HHA/RN.  Continue acetaminophen and PRN tramadol for pain. f/w ortho 3 months as directed    History of embolism  Is on Lovenox x 14 days, last day is 6/19, then Xarelto until 7/5 for DVT prophylaxis    Essential hypertension, benign  On admission to TCU her blood pressures have been variable, stable. Continue with metoprolol and follow up with PCP    Hyperlipidemia, unspecified hyperlipidemia type  Continue with simvastatin    Hypothyroidism, unspecified type  Continue with levothyroxine, yearly tsh    Osteoporosis, unspecified osteoporosis type, unspecified  pathological fracture presence  on Ca/D and Prolia    Acute blood loss anemia  HGB 12.5 --9.8 postop, no s/s of bleeding    Past Medical History:  has a past medical history of Chronic osteoarthritis, Hyperlipidemia, Hypertension, Hypothyroidism, and Osteoporosis.    Discharge Medications:  Current Outpatient Medications   Medication Sig Dispense Refill     ACETAMINOPHEN PO Take 1,000 mg by mouth every 6 hours as needed for pain        aspirin 81 MG EC tablet Take 81 mg by mouth daily       calcium carbonate (OS-MARCEL) 1500 (600 Ca) MG tablet Take 1,200 mg by mouth daily       cyanocobalamin (CYANOCOBALAMIN) 1000 MCG/ML injection Inject 1 mL into the muscle every 14 days       denosumab (PROLIA) 60 MG/ML SOLN injection Inject 60 mg Subcutaneous every 6 months Due December 2017       [START ON 6/15/2019] enoxaparin (LOVENOX) 40 MG/0.4ML syringe Inject 0.4 mLs (40 mg) Subcutaneous daily for 4 days 4 Syringe 0     EPINEPHrine (EPIPEN) 0.3 MG/0.3ML injection Inject 0.3 mg into the muscle once as needed for anaphylaxis       fluticasone (FLONASE) 50 MCG/ACT nasal spray Spray 2 sprays into both nostrils daily       LEVOTHYROXINE SODIUM PO Take 75 mcg by mouth daily       METOPROLOL SUCCINATE ER PO Take 100 mg by mouth daily        oxybutynin ER (DITROPAN-XL) 5 MG 24 hr tablet Take 5 mg by mouth 2 times daily       polyethylene glycol (MIRALAX/GLYCOLAX) powder Take 17 g by mouth daily as needed        rivaroxaban ANTICOAGULANT (XARELTO) 10 MG TABS tablet Take 1 tablet (10 mg) by mouth daily 30 tablet 0     SIMVASTATIN PO Take 40 mg by mouth At Bedtime       traMADol (ULTRAM) 50 MG tablet Take 1 tablet (50 mg) by mouth every 6 hours as needed for severe pain 30 tablet 0     vitamin D (ERGOCALCIFEROL) 11465 UNIT capsule Take 50,000 Units by mouth daily Take every Mon, Fri         Medication Changes/Rationale:     Start Xarelto on 6/20 until  F/u with ortho    Lovenox daily until 6/19    Controlled medications sent with  patient:   Medication Tramadol, lovenox, and Xarelto electronically prescribed to [facility] [place], [facility]     ROS:   10 point ROS of systems including Constitutional, Eyes, Respiratory, Cardiovascular, Gastroenterology, Genitourinary, Integumentary, Musculoskeletal, Psychiatric were all negative except for pertinent positives noted in my HPI.    Physical Exam:   Vitals: /73   Pulse 86   Temp 98.7  F (37.1  C)   Resp 16   Ht 1.524 m (5')   Wt 72.7 kg (160 lb 3.2 oz)   SpO2 94%   BMI 31.29 kg/m    BMI= Body mass index is 31.29 kg/m .  GENERAL APPEARANCE:  Alert, in no distress  ENT:  Mouth and posterior oropharynx normal, moist mucous membranes, normal hearing acuity  EYES:  EOM, conjunctivae, lids, pupils and irises normal  RESP:  respiratory effort and palpation of chest normal, lungs clear to auscultation , no respiratory distress  CV:  Palpation and auscultation of heart done , regular rate and rhythm, no murmur, rub, or gallop, peripheral edema 1+ in left lower extremity  ABDOMEN:  normal bowel sounds, soft, nontender, no hepatosplenomegaly or other masses  M/S:   Gait and station abnormal ambulatory with walker  SKIN:  wound healing well, no signs of infection left hip  NEURO:   Cranial nerves 2-12 are normal tested and grossly at patient's baseline  PSYCH:  oriented X 3     SNF labs: Recent labs in [facility] reviewed by me today.     DISCHARGE PLAN:    Follow up labs: No labs orders/due    Medical Follow Up:      Follow up with primary care provider in 7-10 days    [facility] referral needed and placed by this provider: No    Current [place] scheduled appointments: none    Discharge Services: Home Care:  Occupational Therapy, Physical Therapy, Home Health Aide and From:  [facility]    Discharge Instructions Verbalized to Patient at Discharge:     OK to shower but no bathing or soaking until approved by surgeon.     Notify your surgeon if you have increased redness, swelling, tenderness, or  drainage at your incision site.     Notify primary PCP if you have a fever greater than 100.5 degrees.     DO NOT DRIVE while taking narcotic pain medications.     Lovenox injections until 6/19 then on 6/20 start taking xarelto daily until 7/5.      TOTAL DISCHARGE TIME:   Greater than 30 minutes  Electronically signed by:  Sigrid Augustin NP     Documentation of Face to Face and Certification for Home Health Services    I certify that patient: Claudia Powers is under my care and that I, or a nurse practitioner or physician's assistant working with me, had a face-to-face encounter that meets the physician face-to-face encounter requirements with this patient on: 6/13/2019.    This encounter with the patient was in whole, or in part, for the following medical condition, which is the primary reason for home health care:   Primary osteoarthritis of left hip  History of embolism  Essential hypertension, benign  Hyperlipidemia, unspecified hyperlipidemia type  Hypothyroidism, unspecified type  Osteoporosis, unspecified osteoporosis type, unspecified pathological fracture presence  Acute blood loss anemia  Physical deconditioning    I certify that, based on my findings, the following services are medically necessary home health services: Nursing, Occupational Therapy and Physical Therapy.    My clinical findings support the need for the above services because: Nurse is needed: For complex aftercare of surgical procedures because the patient needs instruction and cannot perform care on their own due to: wound assessment, pain management, safety.., Occupational Therapy Services are needed to assess and treat cognitive ability and address ADL safety due to impairment in upper body exercises, endurance. and Physical Therapy Services are needed to assess and treat the following functional impairments: physical deconditioning, strengthening, ambulation.    Further, I certify that my clinical findings support that this patient  is homebound (i.e. absences from home require considerable and taxing effort and are for medical reasons or [employer] services or infrequently or of short duration when for other reasons) because: Requires assistance of another person or specialized equipment to access medical services because patient: Has prohibitive pain during ambulation. and Range of motion limitations prevents ability to exit home safely...    Based on the above findings. I certify that this patient is confined to the home and needs intermittent skilled nursing care, physical therapy and/or speech therapy.  The patient is under my care, and I have initiated the establishment of the plan of care.  This patient will be followed by a physician who will periodically review the plan of care.  Physician/Provider to provide follow up care: Katia Kenny    Attending hospital physician (the Medicare certified PECOS provider): Dr.Abdul Osiel MD, signing F2F and only signing for initial order. Please send all follow up questions and concerns or needed follow up signatures to the PCP, who [facility] has on file as:  Katia Kenny.    Physician Signature: See electronic signature associated with these discharge orders.  Date: 6/13/2019

## 2019-06-13 NOTE — LETTER
6/13/2019        RE: Claudia Powers  [address]  [place] [address]        [place] GERIATRIC SERVICES DISCHARGE SUMMARY  PATIENT'S NAME: Claudia Powers  YOB: 1945  MEDICAL RECORD NUMBER:  [id number]  Place of Service where encounter took place:  [facility]  (S) [[id number]]    PRIMARY CARE PROVIDER AND CLINIC RESPONSIBLE AFTER TRANSFER:   Katia Kenny, [address] / [facility] [address]    Non-G Provider     Transferring providers: Sigrid Augustin NP, Jordan Cartagena MD  Recent Hospitalization/ED:  [facility] stay 6/4/19 through 6/8/19.  Date of SNF Admission: June / 08 / 2019  Date of SNF (anticipated) Discharge: [facility] / 15 / 2019  Discharged to: with family to home  Cognitive Scores: SLUMS: 27/30  Physical Function: >200 ft with wheeled walker, CGA with transfers, moderate assist with lower body dressing, \  DME: has walker, cane and shower bench in the home    CODE STATUS/ADVANCE DIRECTIVES DISCUSSION:  DNR/DNI   ALLERGIES: Bee venom; Wasp venom protein; Banana; Bees; Food; Hornet venom; Lactase; Lactose; Ondansetron; Pentazocine; Pollen extract; Raspberry; Strawberry; Talwin nx [pentazocine-naloxone]; Adhesive tape; Avocado; and Other (do not use)    Primary osteoarthritis of left hip         DISCHARGE DIAGNOSIS/NURSING FACILITY COURSE:   Primary osteoarthritis of left hip  Physical deconditioning  S/p LTHA 6/4 at [facility]. WBAT. Will go home with PT/OT/HHA/RN.  Continue acetaminophen and PRN tramadol for pain. f/w ortho 3 months as directed    History of embolism  Is on Lovenox x 14 days, last day is 6/19, then Xarelto until 7/5 for DVT prophylaxis    Essential hypertension, benign  On admission to TCU her blood pressures have been variable, stable. Continue with metoprolol and follow up with PCP    Hyperlipidemia, unspecified hyperlipidemia type  Continue with simvastatin    Hypothyroidism, unspecified type  Continue with levothyroxine,  yearly tsh    Osteoporosis, unspecified osteoporosis type, unspecified pathological fracture presence  on Ca/D and Prolia    Acute blood loss anemia  HGB 12.5 --9.8 postop, no s/s of bleeding    Past Medical History:  has a past medical history of Chronic osteoarthritis, Hyperlipidemia, Hypertension, Hypothyroidism, and Osteoporosis.    Discharge Medications:  Current Outpatient Medications   Medication Sig Dispense Refill     ACETAMINOPHEN PO Take 1,000 mg by mouth every 6 hours as needed for pain        aspirin 81 MG EC tablet Take 81 mg by mouth daily       calcium carbonate (OS-MARCEL) 1500 (600 Ca) MG tablet Take 1,200 mg by mouth daily       cyanocobalamin (CYANOCOBALAMIN) 1000 MCG/ML injection Inject 1 mL into the muscle every 14 days       denosumab (PROLIA) 60 MG/ML SOLN injection Inject 60 mg Subcutaneous every 6 months Due December 2017       [START ON 6/15/2019] enoxaparin (LOVENOX) 40 MG/0.4ML syringe Inject 0.4 mLs (40 mg) Subcutaneous daily for 4 days 4 Syringe 0     EPINEPHrine (EPIPEN) 0.3 MG/0.3ML injection Inject 0.3 mg into the muscle once as needed for anaphylaxis       fluticasone (FLONASE) 50 MCG/ACT nasal spray Spray 2 sprays into both nostrils daily       LEVOTHYROXINE SODIUM PO Take 75 mcg by mouth daily       METOPROLOL SUCCINATE ER PO Take 100 mg by mouth daily        oxybutynin ER (DITROPAN-XL) 5 MG 24 hr tablet Take 5 mg by mouth 2 times daily       polyethylene glycol (MIRALAX/GLYCOLAX) powder Take 17 g by mouth daily as needed        rivaroxaban ANTICOAGULANT (XARELTO) 10 MG TABS tablet Take 1 tablet (10 mg) by mouth daily 30 tablet 0     SIMVASTATIN PO Take 40 mg by mouth At Bedtime       traMADol (ULTRAM) 50 MG tablet Take 1 tablet (50 mg) by mouth every 6 hours as needed for severe pain 30 tablet 0     vitamin D (ERGOCALCIFEROL) 87219 UNIT capsule Take 50,000 Units by mouth daily Take every Mon, Fri         Medication Changes/Rationale:     Start Xarelto on 6/20 until  F/u with  ortho    Lovenox daily until 6/19    Controlled medications sent with patient:   Medication Tramadol, lovenox, and Xarelto electronically prescribed to [facility] [place], [facility]     ROS:   10 point ROS of systems including Constitutional, Eyes, Respiratory, Cardiovascular, Gastroenterology, Genitourinary, Integumentary, Musculoskeletal, Psychiatric were all negative except for pertinent positives noted in my HPI.    Physical Exam:   Vitals: /73   Pulse 86   Temp 98.7  F (37.1  C)   Resp 16   Ht 1.524 m (5')   Wt 72.7 kg (160 lb 3.2 oz)   SpO2 94%   BMI 31.29 kg/m     BMI= Body mass index is 31.29 kg/m .  GENERAL APPEARANCE:  Alert, in no distress  ENT:  Mouth and posterior oropharynx normal, moist mucous membranes, normal hearing acuity  EYES:  EOM, conjunctivae, lids, pupils and irises normal  RESP:  respiratory effort and palpation of chest normal, lungs clear to auscultation , no respiratory distress  CV:  Palpation and auscultation of heart done , regular rate and rhythm, no murmur, rub, or gallop, peripheral edema 1+ in left lower extremity  ABDOMEN:  normal bowel sounds, soft, nontender, no hepatosplenomegaly or other masses  M/S:   Gait and station abnormal ambulatory with walker  SKIN:  wound healing well, no signs of infection left hip  NEURO:   Cranial nerves 2-12 are normal tested and grossly at patient's baseline  PSYCH:  oriented X 3     SNF labs: Recent labs in [facility] reviewed by me today.     DISCHARGE PLAN:    Follow up labs: No labs orders/due    Medical Follow Up:      Follow up with primary care provider in 7-10 days    [facility] referral needed and placed by this provider: No    Current [place] scheduled appointments: none    Discharge Services: Home Care:  Occupational Therapy, Physical Therapy, Home Health Aide and From:  [facility]    Discharge Instructions Verbalized to Patient at Discharge:     OK to shower but no bathing or soaking until approved by surgeon.     Notify your  surgeon if you have increased redness, swelling, tenderness, or drainage at your incision site.     Notify primary PCP if you have a fever greater than 100.5 degrees.     DO NOT DRIVE while taking narcotic pain medications.     Lovenox injections until 6/19 then on 6/20 start taking xarelto daily until 7/5.      TOTAL DISCHARGE TIME:   Greater than 30 minutes  Electronically signed by:  Sigrid Augustin NP     Documentation of Face to Face and Certification for Home Health Services    I certify that patient: Claudia Powers is under my care and that I, or a nurse practitioner or physician's assistant working with me, had a face-to-face encounter that meets the physician face-to-face encounter requirements with this patient on: 6/13/2019.    This encounter with the patient was in whole, or in part, for the following medical condition, which is the primary reason for home health care:   Primary osteoarthritis of left hip  History of embolism  Essential hypertension, benign  Hyperlipidemia, unspecified hyperlipidemia type  Hypothyroidism, unspecified type  Osteoporosis, unspecified osteoporosis type, unspecified pathological fracture presence  Acute blood loss anemia  Physical deconditioning    I certify that, based on my findings, the following services are medically necessary home health services: Nursing, Occupational Therapy and Physical Therapy.    My clinical findings support the need for the above services because: Nurse is needed: For complex aftercare of surgical procedures because the patient needs instruction and cannot perform care on their own due to: wound assessment, pain management, safety.., Occupational Therapy Services are needed to assess and treat cognitive ability and address ADL safety due to impairment in upper body exercises, endurance. and Physical Therapy Services are needed to assess and treat the following functional impairments: physical deconditioning, strengthening,  ambulation.    Further, I certify that my clinical findings support that this patient is homebound (i.e. absences from home require considerable and taxing effort and are for medical reasons or [employer] services or infrequently or of short duration when for other reasons) because: Requires assistance of another person or specialized equipment to access medical services because patient: Has prohibitive pain during ambulation. and Range of motion limitations prevents ability to exit home safely...    Based on the above findings. I certify that this patient is confined to the home and needs intermittent skilled nursing care, physical therapy and/or speech therapy.  The patient is under my care, and I have initiated the establishment of the plan of care.  This patient will be followed by a physician who will periodically review the plan of care.  Physician/Provider to provide follow up care: Katia Kenny    Attending [facility] physician (the Medicare certified PECOS provider): Dr.Abdul Osiel MD, signing F2F and only signing for initial order. Please send all follow up questions and concerns or needed follow up signatures to the PCP, who [facility] has on file as:  Katia Kenny.    Physician Signature: See electronic signature associated with these discharge orders.  Date: 6/13/2019                    Sincerely,        Sigird Augustin NP

## 2019-06-17 ENCOUNTER — COMMUNICATION - HEALTHEAST (OUTPATIENT)
Dept: FAMILY MEDICINE | Facility: CLINIC | Age: 74
End: 2019-06-17

## 2019-07-01 ENCOUNTER — AMBULATORY - HEALTHEAST (OUTPATIENT)
Dept: FAMILY MEDICINE | Facility: CLINIC | Age: 74
End: 2019-07-01

## 2019-07-01 ENCOUNTER — RECORDS - HEALTHEAST (OUTPATIENT)
Dept: VASCULAR ULTRASOUND | Facility: CLINIC | Age: 74
End: 2019-07-01

## 2019-07-01 DIAGNOSIS — R09.89 OTHER SPECIFIED SYMPTOMS AND SIGNS INVOLVING THE CIRCULATORY AND RESPIRATORY SYSTEMS: ICD-10-CM

## 2019-07-08 ENCOUNTER — COMMUNICATION - HEALTHEAST (OUTPATIENT)
Dept: FAMILY MEDICINE | Facility: CLINIC | Age: 74
End: 2019-07-08

## 2019-07-08 ENCOUNTER — OFFICE VISIT - HEALTHEAST (OUTPATIENT)
Dept: FAMILY MEDICINE | Facility: CLINIC | Age: 74
End: 2019-07-08

## 2019-07-08 DIAGNOSIS — Z12.31 VISIT FOR SCREENING MAMMOGRAM: ICD-10-CM

## 2019-07-08 DIAGNOSIS — D62 ANEMIA DUE TO BLOOD LOSS, ACUTE: ICD-10-CM

## 2019-07-08 DIAGNOSIS — Z00.00 HEALTH CARE MAINTENANCE: ICD-10-CM

## 2019-07-08 DIAGNOSIS — M16.0 PRIMARY OSTEOARTHRITIS OF BOTH HIPS: ICD-10-CM

## 2019-07-08 DIAGNOSIS — M25.562 CHRONIC PAIN OF BOTH KNEES: ICD-10-CM

## 2019-07-08 DIAGNOSIS — M25.561 CHRONIC PAIN OF BOTH KNEES: ICD-10-CM

## 2019-07-08 DIAGNOSIS — G89.29 CHRONIC PAIN OF BOTH KNEES: ICD-10-CM

## 2019-07-15 ENCOUNTER — HOSPITAL ENCOUNTER (OUTPATIENT)
Dept: MAMMOGRAPHY | Facility: CLINIC | Age: 74
Discharge: HOME | End: 2019-07-15
Attending: FAMILY MEDICINE

## 2019-07-15 DIAGNOSIS — Z12.31 VISIT FOR SCREENING MAMMOGRAM: ICD-10-CM

## 2019-07-22 ENCOUNTER — COMMUNICATION - HEALTHEAST (OUTPATIENT)
Dept: SCHEDULING | Facility: CLINIC | Age: 74
End: 2019-07-22

## 2019-07-22 ENCOUNTER — COMMUNICATION - HEALTHEAST (OUTPATIENT)
Dept: FAMILY MEDICINE | Facility: CLINIC | Age: 74
End: 2019-07-22

## 2019-07-22 DIAGNOSIS — H57.9 ITCHY EYES: ICD-10-CM

## 2019-08-22 ENCOUNTER — COMMUNICATION - HEALTHEAST (OUTPATIENT)
Dept: FAMILY MEDICINE | Facility: CLINIC | Age: 74
End: 2019-08-22

## 2019-08-22 DIAGNOSIS — Z91.030 BEE STING ALLERGY: ICD-10-CM

## 2019-08-30 ENCOUNTER — COMMUNICATION - HEALTHEAST (OUTPATIENT)
Dept: FAMILY MEDICINE | Facility: CLINIC | Age: 74
End: 2019-08-30

## 2019-11-09 ENCOUNTER — COMMUNICATION - HEALTHEAST (OUTPATIENT)
Dept: FAMILY MEDICINE | Facility: CLINIC | Age: 74
End: 2019-11-09

## 2019-11-09 DIAGNOSIS — E53.8 VITAMIN B12 DEFICIENCY: ICD-10-CM

## 2019-11-09 DIAGNOSIS — E78.5 HYPERLIPIDEMIA: ICD-10-CM

## 2019-11-09 DIAGNOSIS — E55.9 VITAMIN D DEFICIENCY: ICD-10-CM

## 2019-12-20 ENCOUNTER — COMMUNICATION - HEALTHEAST (OUTPATIENT)
Dept: FAMILY MEDICINE | Facility: CLINIC | Age: 74
End: 2019-12-20

## 2019-12-20 DIAGNOSIS — E03.9 HYPOTHYROIDISM: ICD-10-CM

## 2019-12-20 DIAGNOSIS — I10 ESSENTIAL HYPERTENSION: ICD-10-CM

## 2019-12-20 DIAGNOSIS — J30.89 ENVIRONMENTAL AND SEASONAL ALLERGIES: ICD-10-CM

## 2020-01-30 ENCOUNTER — COMMUNICATION - HEALTHEAST (OUTPATIENT)
Dept: FAMILY MEDICINE | Facility: CLINIC | Age: 75
End: 2020-01-30

## 2020-01-30 DIAGNOSIS — N39.41 URGE INCONTINENCE: ICD-10-CM

## 2020-07-04 ENCOUNTER — COMMUNICATION - HEALTHEAST (OUTPATIENT)
Dept: FAMILY MEDICINE | Facility: CLINIC | Age: 75
End: 2020-07-04

## 2020-07-27 ENCOUNTER — RECORDS - HEALTHEAST (OUTPATIENT)
Dept: ADMINISTRATIVE | Facility: OTHER | Age: 75
End: 2020-07-27

## 2020-07-27 LAB
CREAT SERPL-MCNC: 0.6 MG/DL (ref 0.6–0.93)
GFR ESTIMATE EXT - HISTORICAL: 90 ML/MIN/1.73M2
GFR ESTIMATE, IF BLACK EXT - HISTORICAL: 105 ML/MIN/1.73M2

## 2020-07-31 ENCOUNTER — COMMUNICATION - HEALTHEAST (OUTPATIENT)
Dept: FAMILY MEDICINE | Facility: CLINIC | Age: 75
End: 2020-07-31

## 2020-07-31 DIAGNOSIS — E55.9 VITAMIN D DEFICIENCY: ICD-10-CM

## 2020-07-31 DIAGNOSIS — E78.5 HYPERLIPIDEMIA: ICD-10-CM

## 2020-08-04 ENCOUNTER — HOSPITAL ENCOUNTER (OUTPATIENT)
Dept: MAMMOGRAPHY | Facility: CLINIC | Age: 75
Discharge: HOME | End: 2020-08-04
Attending: FAMILY MEDICINE

## 2020-08-04 DIAGNOSIS — Z12.31 VISIT FOR SCREENING MAMMOGRAM: ICD-10-CM

## 2020-08-07 ENCOUNTER — RECORDS - HEALTHEAST (OUTPATIENT)
Dept: ADMINISTRATIVE | Facility: OTHER | Age: 75
End: 2020-08-07

## 2020-08-10 ENCOUNTER — AMBULATORY - HEALTHEAST (OUTPATIENT)
Dept: PHARMACY | Facility: CLINIC | Age: 75
End: 2020-08-10

## 2020-08-13 ENCOUNTER — RECORDS - HEALTHEAST (OUTPATIENT)
Dept: HEALTH INFORMATION MANAGEMENT | Facility: CLINIC | Age: 75
End: 2020-08-13

## 2020-08-18 ENCOUNTER — COMMUNICATION - HEALTHEAST (OUTPATIENT)
Dept: FAMILY MEDICINE | Facility: CLINIC | Age: 75
End: 2020-08-18

## 2020-09-03 ENCOUNTER — RECORDS - HEALTHEAST (OUTPATIENT)
Dept: ADMINISTRATIVE | Facility: OTHER | Age: 75
End: 2020-09-03

## 2020-09-10 ENCOUNTER — INFUSION - HEALTHEAST (OUTPATIENT)
Dept: INFUSION THERAPY | Facility: CLINIC | Age: 75
End: 2020-09-10

## 2020-09-10 DIAGNOSIS — M81.0 OSTEOPOROSIS: ICD-10-CM

## 2020-09-10 LAB
ANION GAP SERPL CALCULATED.3IONS-SCNC: 7 MMOL/L (ref 5–18)
BUN SERPL-MCNC: 14 MG/DL (ref 8–28)
CALCIUM SERPL-MCNC: 8.7 MG/DL (ref 8.5–10.5)
CHLORIDE BLD-SCNC: 109 MMOL/L (ref 98–107)
CO2 SERPL-SCNC: 26 MMOL/L (ref 22–31)
CREAT SERPL-MCNC: 0.62 MG/DL (ref 0.6–1.1)
GFR SERPL CREATININE-BSD FRML MDRD: >60 ML/MIN/1.73M2
GLUCOSE BLD-MCNC: 75 MG/DL (ref 70–125)
POTASSIUM BLD-SCNC: 3.6 MMOL/L (ref 3.5–5)
SODIUM SERPL-SCNC: 142 MMOL/L (ref 136–145)

## 2020-09-19 ENCOUNTER — COMMUNICATION - HEALTHEAST (OUTPATIENT)
Dept: FAMILY MEDICINE | Facility: CLINIC | Age: 75
End: 2020-09-19

## 2020-12-03 ENCOUNTER — COMMUNICATION - HEALTHEAST (OUTPATIENT)
Dept: FAMILY MEDICINE | Facility: CLINIC | Age: 75
End: 2020-12-03

## 2020-12-03 DIAGNOSIS — E53.8 VITAMIN B12 DEFICIENCY: ICD-10-CM

## 2020-12-03 DIAGNOSIS — E55.9 VITAMIN D DEFICIENCY: ICD-10-CM

## 2020-12-03 DIAGNOSIS — E78.5 HYPERLIPIDEMIA: ICD-10-CM

## 2020-12-03 DIAGNOSIS — N39.41 URGE INCONTINENCE: ICD-10-CM

## 2020-12-04 ENCOUNTER — COMMUNICATION - HEALTHEAST (OUTPATIENT)
Dept: FAMILY MEDICINE | Facility: CLINIC | Age: 75
End: 2020-12-04

## 2020-12-04 DIAGNOSIS — N39.41 URGE INCONTINENCE: ICD-10-CM

## 2020-12-07 ENCOUNTER — COMMUNICATION - HEALTHEAST (OUTPATIENT)
Dept: FAMILY MEDICINE | Facility: CLINIC | Age: 75
End: 2020-12-07

## 2020-12-07 DIAGNOSIS — E78.5 HYPERLIPIDEMIA: ICD-10-CM

## 2020-12-07 DIAGNOSIS — E53.8 VITAMIN B12 DEFICIENCY: ICD-10-CM

## 2020-12-07 DIAGNOSIS — E55.9 VITAMIN D DEFICIENCY: ICD-10-CM

## 2020-12-19 ENCOUNTER — COMMUNICATION - HEALTHEAST (OUTPATIENT)
Dept: FAMILY MEDICINE | Facility: CLINIC | Age: 75
End: 2020-12-19

## 2020-12-19 DIAGNOSIS — J30.89 ENVIRONMENTAL AND SEASONAL ALLERGIES: ICD-10-CM

## 2021-01-15 ENCOUNTER — COMMUNICATION - HEALTHEAST (OUTPATIENT)
Dept: FAMILY MEDICINE | Facility: CLINIC | Age: 76
End: 2021-01-15

## 2021-01-18 ENCOUNTER — COMMUNICATION - HEALTHEAST (OUTPATIENT)
Dept: FAMILY MEDICINE | Facility: CLINIC | Age: 76
End: 2021-01-18

## 2021-02-19 ENCOUNTER — COMMUNICATION - HEALTHEAST (OUTPATIENT)
Dept: FAMILY MEDICINE | Facility: CLINIC | Age: 76
End: 2021-02-19

## 2021-02-19 DIAGNOSIS — I10 ESSENTIAL HYPERTENSION: ICD-10-CM

## 2021-02-19 DIAGNOSIS — E03.9 HYPOTHYROIDISM: ICD-10-CM

## 2021-03-27 ENCOUNTER — COMMUNICATION - HEALTHEAST (OUTPATIENT)
Dept: FAMILY MEDICINE | Facility: CLINIC | Age: 76
End: 2021-03-27

## 2021-03-27 DIAGNOSIS — N39.41 URGE INCONTINENCE: ICD-10-CM

## 2021-03-27 DIAGNOSIS — J30.89 ENVIRONMENTAL AND SEASONAL ALLERGIES: ICD-10-CM

## 2021-04-02 ENCOUNTER — COMMUNICATION - HEALTHEAST (OUTPATIENT)
Dept: FAMILY MEDICINE | Facility: CLINIC | Age: 76
End: 2021-04-02

## 2021-04-23 ENCOUNTER — COMMUNICATION - HEALTHEAST (OUTPATIENT)
Dept: FAMILY MEDICINE | Facility: CLINIC | Age: 76
End: 2021-04-23

## 2021-04-26 ENCOUNTER — COMMUNICATION - HEALTHEAST (OUTPATIENT)
Dept: FAMILY MEDICINE | Facility: CLINIC | Age: 76
End: 2021-04-26

## 2021-04-26 DIAGNOSIS — N39.41 URGE INCONTINENCE: ICD-10-CM

## 2021-05-27 VITALS
HEART RATE: 65 BPM | SYSTOLIC BLOOD PRESSURE: 188 MMHG | OXYGEN SATURATION: 98 % | TEMPERATURE: 98.2 F | DIASTOLIC BLOOD PRESSURE: 90 MMHG

## 2021-05-29 NOTE — TELEPHONE ENCOUNTER
Orders being requested: Carotid Ultra Sound  Reason service is needed/diagnosis: Faint left Carotid Bruit  When are orders needed by: No rush  Where to send Orders: Place on Patient's chart, patient will schedule with [facility]  Okay to leave detailed message?  Yes

## 2021-05-29 NOTE — TELEPHONE ENCOUNTER
Orders being requested: Physical therapy home visit three times a week x one week then two times a week for one week.  Reason service is needed/diagnosis:  s/p  left hip replacement  When are orders needed by: ASAP  Where to send Orders: Phone:  [phone]  Okay to leave detailed message?  Yes Alie

## 2021-05-29 NOTE — TELEPHONE ENCOUNTER
"Called and got the reach of patient. She is not able to talk right now and will call back. \" Okay to relay message\"    "

## 2021-05-29 NOTE — TELEPHONE ENCOUNTER
Orders being requested: carmelita vallejo from home care   Reason service is needed/diagnosis: home after left hip replacement, ot 2x a week for 2 weeks adapting to adls and equipment safety and mobility   When are orders needed by: as soon as possible   Where to send Orders: Phone:  [phone]  Okay to leave detailed message?  Yes

## 2021-05-30 NOTE — PROGRESS NOTES
Office Visit  [facility]  Date of Service: 07/08/2019      Subjective   Claudia Powers is a 74 y.o. female who presents for Post-op Problem    Claudia is here today for follow-up of recent hip surgery.  On 6/4/2019, she had a left total hip replacement at [facility].  Prior to her surgery, her pain was 7 out of 10 in severity.  It was significantly limiting her ability to be active.  After surgery, even while in the hospital postoperatively, the worst the pain was was 4 out of 10.  Now, with her hip pain better, she is noticing pain in her knees more.  It is a dull aching in the medial knees which is worse with weather.  She recently purchased Tylenol 650 mg and has been taking 1 to 2 pills at a time a few times a day.  She notices some benefit but still has some pain.  Prior to surgery, she was taking 800 mg of ibuprofen 3 times daily.  She stopped taking the ibuprofen after the surgery because the hip felt so much better.  She does have a follow-up appointment scheduled with her orthopedic surgeon on 9/3/2019.      Objective   /70 (Patient Site: Left Arm, Patient Position: Sitting, Cuff Size: Adult Regular)   Pulse 60   Resp 20   Wt 157 lb (71.2 kg)   BMI 30.92 kg/m     She reports that she has quit smoking. She has never used smokeless tobacco.    Gen: Alert, no apparent distress.  Heart: Regular rate and rhythm, no murmurs.  Lungs: Clear to auscultation bilaterally, no increased work of breathing.  Abdomen: Soft, non-tender, non-distended, bowel sounds normal.  Extremities: No clubbing, cyanosis, edema.    Results for orders placed or performed in visit on 11/08/18   Thyroid Stimulating Hormone (TSH)   Result Value Ref Range    TSH 1.52 0.30 - 5.00 uIU/mL   Comprehensive Metabolic Panel   Result Value Ref Range    Sodium 143 136 - 145 mmol/L    Potassium 4.4 3.5 - 5.0 mmol/L    Chloride 109 (H) 98 - 107 mmol/L    CO2 22 22 - 31 mmol/L    Anion Gap, Calculation 12 5 - 18  mmol/L    Glucose 86 70 - 125 mg/dL    BUN 15 8 - 28 mg/dL    Creatinine 0.61 0.60 - 1.10 mg/dL    GFR MDRD Af Amer >60 >60 mL/min/1.73m2    GFR MDRD Non Af Amer >60 >60 mL/min/1.73m2    Bilirubin, Total 0.3 0.0 - 1.0 mg/dL    Calcium 9.1 8.5 - 10.5 mg/dL    Protein, Total 6.8 6.0 - 8.0 g/dL    Albumin 3.6 3.5 - 5.0 g/dL    Alkaline Phosphatase 58 45 - 120 U/L    AST 25 0 - 40 U/L    ALT 22 0 - 45 U/L     No results found.    Assessment & Plan     1. Knee pain.  Probably due to arthritis.  She is going to start with acetaminophen.  Discussed maximum dose of 3000 mg.  If that is not enough to treat the pain completely, she will add in ibuprofen 600 mg up to 3 times daily.  2. Hip osteoarthritis, resolved.  Now has bilateral hip arthroplasty.  The patient is healing well.  Suture ends were trimmed from the ends of her incision.  Follow[facility] with [facility], as planned.  3. Acute blood loss anemia.  Discussed high iron diet.  Patient declines follow-up for recheck of hemoglobin today.  4. Health maintenance: Shingrix and MMR immunizations given.  Patient to return to clinic in 2 to 6 months for second Shingrix shot.  Does not have a documented history of MMR immunization and there is a current measles outbreak in the nation.      Order Summary                                                      1. Chronic pain of both knees     2. Visit for screening mammogram  CANCELED: Mammo Screening Bilateral   3. Health care maintenance  Varicella Zoster, Recombinant Vaccine IM    MMR vaccine subcutaneous   4. Primary osteoarthritis of both hips     5. Anemia due to blood loss, acute        Future Appointments   Date Time Provider Department Center   7/15/2019 10:15 AM [facility] [address]   [facility]       Completed by: Katia Kenny M.D., [facility]. 7/8/2019 11:29 AM.  This transcription uses voice recognition software, which may contain typographical errors.

## 2021-05-30 NOTE — PATIENT INSTRUCTIONS - HE
For Pain    Start with acetaminophen 650 mg every 4-6 hours, as needed for pain. Do not exceed 3000 mg per 24 hours.    If you still have pain, add ibuprofen 600 mg every 8 hours.    Anemia    Eat iron-rich foods like meat, poultry, fish, nuts, seeds, peas, beans, dried fruits, and cereal with iron in it (like Cheerios or Cream of Wheat).

## 2021-05-30 NOTE — TELEPHONE ENCOUNTER
Who is calling:  Patient  Reason for Call:  Patient stated she would like a recommendation on her an eye doctor that she could see for her left eye issue. Patient complains of a watery eye, gunk in the corner of her eye, and a little itchiness. Patient denies redness or pink in her left eye. Patient was transferred to triage.  Date of last appointment with primary care: 7/8/19  Okay to leave a detailed message: No

## 2021-05-30 NOTE — TELEPHONE ENCOUNTER
"  Declined triage      CC:  Would like appt to see someone for eye issue       \"watery eye, gunk in the corner of her eye, and a little itchiness. Patient denies redness or pink in her left eye\"      No blurred vision / floaters or spots         A/P:   > Appt for this afternoon    > I suggested warm compresses in the meantime      Ryley Drew RN   Triage and Medication Refills      "

## 2021-05-31 VITALS — WEIGHT: 152 LBS | BODY MASS INDEX: 30.64 KG/M2 | HEIGHT: 59 IN

## 2021-05-31 VITALS — BODY MASS INDEX: 31.85 KG/M2 | HEIGHT: 59 IN | WEIGHT: 158 LBS

## 2021-05-31 VITALS — WEIGHT: 155 LBS | HEIGHT: 60 IN | BODY MASS INDEX: 30.43 KG/M2

## 2021-05-31 VITALS — WEIGHT: 153 LBS | BODY MASS INDEX: 30.9 KG/M2

## 2021-05-31 NOTE — TELEPHONE ENCOUNTER
Medication Request  Medication name: Epi-Pen  Pharmacy reported the patient would like the generic version of this medication.  Pharmacy Name and Location: [facility] [place] [facility]  Reason for request: Request made by the patient's pharmacy.  Patient informed the pharmacy that the Rx she has at home is  and she is in need of a new one.  When did you use medication last?:  Caller was not able to provide this information.  Patient offered appointment:  Request made by the patient's pharmacy.  Writer was not javon to offer an appointment.  Okay to leave a detailed message: no

## 2021-05-31 NOTE — TELEPHONE ENCOUNTER
Who is calling:  Trudy Taylor  Reason for Call:  Patient canceled her visit for the Shingrix shot, because she is going to go to her local pharmacy instead. However, patient would like to know if she needs an order from her doctor for the pharmacy for the vaccine shot.  Date of last appointment with primary care: 07-18-19  Okay to leave a detailed message: Yes

## 2021-06-02 VITALS — HEIGHT: 60 IN | BODY MASS INDEX: 33.77 KG/M2 | WEIGHT: 172 LBS

## 2021-06-02 VITALS — WEIGHT: 175 LBS | BODY MASS INDEX: 34.46 KG/M2

## 2021-06-02 VITALS — WEIGHT: 173 LBS | BODY MASS INDEX: 34.07 KG/M2

## 2021-06-03 VITALS — WEIGHT: 157 LBS | BODY MASS INDEX: 30.66 KG/M2

## 2021-06-03 NOTE — TELEPHONE ENCOUNTER
Refill Given    Refill given per Policy, patient informed they are overdue for Labs    Myra Min, [facility] Triage/Med Refill 11/9/2019    Requested Prescriptions   Pending Prescriptions Disp Refills     ergocalciferol (ERGOCALCIFEROL) 50,000 unit capsule [Pharmacy Med Name: VITAMIN D2 1.25MG(50,000 UNIT)] 24 capsule 2     Sig: TAKE 1 CAPSULE BY MOUTH 2 TIMES A WEEK.       There is no refill protocol information for this order        cyanocobalamin 1,000 mcg/mL injection [Pharmacy Med Name: CYANOCOBALAMIN 1,000 MCG/ML] 6 mL 8     Sig: INJECT 1 ML (1,000 MCG TOTAL) INTO THE SHOULDER, THIGH, OR BUTTOCKS EVERY 14 (FOURTEEN) DAYS.       Cyanocobalamin (Vitamin B12)  Refill Protocol Failed - 11/9/2019  8:55 AM        Failed - Vitamin B12 level in last 12 months     Vitamin B-12   Date Value Ref Range Status   09/24/2013 1,492 (H) 213 - 816 pg/mL Final     Comment:     Effective date for reference range change in 02/21/2012.             Failed - CBC in last 12 months     WBC   Date Value Ref Range Status   10/20/2011 5.4 4.0 - 11.0 thou/uL Final     RBC   Date Value Ref Range Status   10/20/2011 4.18 3.80 - 5.40 mill/uL Final     Hemoglobin   Date Value Ref Range Status   08/09/2012 13.1 12.0 - 16.0 g/dL Final     Hematocrit   Date Value Ref Range Status   10/20/2011 39.3 35.0 - 47.0 % Final     MCV   Date Value Ref Range Status   10/20/2011 94 80 - 100 fL Final     MCH   Date Value Ref Range Status   10/20/2011 30.8 27.0 - 34.0 pg Final     MCHC   Date Value Ref Range Status   10/20/2011 32.7 32.0 - 36.0 g/dL Final     RDW   Date Value Ref Range Status   10/20/2011 14.6 (H) 11.0 - 14.5 % Final     Platelets   Date Value Ref Range Status   10/20/2011 276 140 - 440 thou/uL Final                Passed - PCP or prescribing provider visit in past 12 months       Last office visit with prescriber/PCP: 7/8/2019 Katia Kenny MD OR same dept: 7/8/2019 Katia Kenny MD OR same specialty: 7/8/2019 Katia Kenny  MD [place] Last physical: 11/8/2018 Last MTM visit: Visit date not found    Next visit within 3 mo: Visit date not found  Next physical within 3 mo: Visit date not found  Prescriber OR PCP: Katia Kenny MD  Last diagnosis associated with med order: 1. Vitamin D deficiency  - ergocalciferol (ERGOCALCIFEROL) 50,000 unit capsule [Pharmacy Med Name: VITAMIN D2 1.25MG(50,000 UNIT)]; TAKE 1 CAPSULE BY MOUTH 2 TIMES A WEEK.  Dispense: 24 capsule; Refill: 2    2. Vitamin B12 deficiency  - cyanocobalamin 1,000 mcg/mL injection [Pharmacy Med Name: CYANOCOBALAMIN 1,000 MCG/ML]; INJECT 1 ML (1,000 MCG TOTAL) INTO THE SHOULDER, THIGH, OR BUTTOCKS EVERY 14 (FOURTEEN) DAYS.  Dispense: 6 mL; Refill: 8    3. Hyperlipidemia  - simvastatin (ZOCOR) 40 MG tablet [Pharmacy Med Name: SIMVASTATIN 40 MG TABLET]; TAKE 1 TABLET BY MOUTH EVERYDAY AT BEDTIME  Dispense: 90 tablet; Refill: 3               simvastatin (ZOCOR) 40 MG tablet [Pharmacy Med Name: SIMVASTATIN 40 MG TABLET] 90 tablet 3     Sig: TAKE 1 TABLET BY MOUTH EVERYDAY AT BEDTIME       Statins Refill Protocol (Hmg CoA Reductase Inhibitors) Passed - 11/9/2019  8:55 AM        Passed - PCP or prescribing provider visit in past 12 months      Last office visit with prescriber/PCP: 7/8/2019 Katia Kenny MD OR same dept: 7/8/2019 Katia Kenny MD OR same specialty: 7/8/2019 Katia Kenny MD  Last physical: 11/8/2018 Last MTM visit: Visit date not found   Next visit within 3 mo: Visit date not found  Next physical within 3 mo: Visit date not found  Prescriber OR PCP: Katia Kenny MD  Last diagnosis associated with med order: 1. Vitamin D deficiency  - ergocalciferol (ERGOCALCIFEROL) 50,000 unit capsule [Pharmacy Med Name: VITAMIN D2 1.25MG(50,000 UNIT)]; TAKE 1 CAPSULE BY MOUTH 2 TIMES A WEEK.  Dispense: 24 capsule; Refill: 2    2. Vitamin B12 deficiency  - cyanocobalamin 1,000 mcg/mL injection [Pharmacy Med Name: CYANOCOBALAMIN 1,000 MCG/ML]; INJECT 1 ML (1,000 MCG TOTAL)  INTO THE SHOULDER, THIGH, OR BUTTOCKS EVERY 14 (FOURTEEN) DAYS.  Dispense: 6 mL; Refill: 8    3. Hyperlipidemia  - simvastatin (ZOCOR) 40 MG tablet [Pharmacy Med Name: SIMVASTATIN 40 MG TABLET]; TAKE 1 TABLET BY MOUTH EVERYDAY AT BEDTIME  Dispense: 90 tablet; Refill: 3    If protocol passes may refill for 12 months if within 3 months of last provider visit (or a total of 15 months).

## 2021-06-03 NOTE — TELEPHONE ENCOUNTER
RN cannot approve Refill Request    RN can NOT refill this medication med is not covered by policy/route to provider. Last office visit: 7/8/2019 Katia Kenny MD Last Physical: 11/8/2018 Last MTM visit: Visit date not found Last visit same specialty: 7/8/2019 Katia Kenny MD.  Next visit within 3 mo: Visit date not found  Next physical within 3 mo: Visit date not found      Myra Min, [facility] Triage/Med Refill 11/9/2019    Requested Prescriptions   Pending Prescriptions Disp Refills     ergocalciferol (ERGOCALCIFEROL) 50,000 unit capsule [Pharmacy Med Name: VITAMIN D2 1.25MG(50,000 UNIT)] 24 capsule 2     Sig: TAKE 1 CAPSULE BY MOUTH 2 TIMES A WEEK.       There is no refill protocol information for this order        cyanocobalamin 1,000 mcg/mL injection [Pharmacy Med Name: CYANOCOBALAMIN 1,000 MCG/ML] 6 mL 8     Sig: INJECT 1 ML (1,000 MCG TOTAL) INTO THE SHOULDER, THIGH, OR BUTTOCKS EVERY 14 (FOURTEEN) DAYS.       Cyanocobalamin (Vitamin B12)  Refill Protocol Failed - 11/9/2019  8:55 AM        Failed - Vitamin B12 level in last 12 months     Vitamin B-12   Date Value Ref Range Status   09/24/2013 1,492 (H) 213 - 816 pg/mL Final     Comment:     Effective date for reference range change in 02/21/2012.             Failed - CBC in last 12 months     WBC   Date Value Ref Range Status   10/20/2011 5.4 4.0 - 11.0 thou/uL Final     RBC   Date Value Ref Range Status   10/20/2011 4.18 3.80 - 5.40 mill/uL Final     Hemoglobin   Date Value Ref Range Status   08/09/2012 13.1 12.0 - 16.0 g/dL Final     Hematocrit   Date Value Ref Range Status   10/20/2011 39.3 35.0 - 47.0 % Final     MCV   Date Value Ref Range Status   10/20/2011 94 80 - 100 fL Final     MCH   Date Value Ref Range Status   10/20/2011 30.8 27.0 - 34.0 pg Final     MCHC   Date Value Ref Range Status   10/20/2011 32.7 32.0 - 36.0 g/dL Final     RDW   Date Value Ref Range Status   10/20/2011 14.6 (H) 11.0 - 14.5 % Final     Platelets   Date  Value Ref Range Status   10/20/2011 276 140 - 440 thou/uL Final                Passed - PCP or prescribing provider visit in past 12 months       Last office visit with prescriber/PCP: 7/8/2019 Katia Kenny MD OR same dept: 7/8/2019 Katia Kenny MD OR same specialty: 7/8/2019 Katia Kenny MD Last physical: 11/8/2018 Last MTM visit: Visit date not found    Next visit within 3 mo: Visit date not found  Next physical within 3 mo: Visit date not found  Prescriber OR PCP: Katia Kenny MD  Last diagnosis associated with med order: 1. Vitamin D deficiency  - ergocalciferol (ERGOCALCIFEROL) 50,000 unit capsule [Pharmacy Med Name: VITAMIN D2 1.25MG(50,000 UNIT)]; TAKE 1 CAPSULE BY MOUTH 2 TIMES A WEEK.  Dispense: 24 capsule; Refill: 2    2. Vitamin B12 deficiency  - cyanocobalamin 1,000 mcg/mL injection [Pharmacy Med Name: CYANOCOBALAMIN 1,000 MCG/ML]; INJECT 1 ML (1,000 MCG TOTAL) INTO THE SHOULDER, THIGH, OR BUTTOCKS EVERY 14 (FOURTEEN) DAYS.  Dispense: 6 mL; Refill: 8    3. Hyperlipidemia  - simvastatin (ZOCOR) 40 MG tablet; TAKE 1 TABLET BY MOUTH EVERYDAY AT BEDTIME  Dispense: 90 tablet; Refill: 2             Signed Prescriptions Disp Refills    simvastatin (ZOCOR) 40 MG tablet 90 tablet 2     Sig: TAKE 1 TABLET BY MOUTH EVERYDAY AT BEDTIME       Statins Refill Protocol (Hmg CoA Reductase Inhibitors) Passed - 11/9/2019  8:55 AM        Passed - PCP or prescribing provider visit in past 12 months      Last office visit with prescriber/PCP: 7/8/2019 Katia Kenny MD OR same dept: 7/8/2019 Katia Kenny MD OR same specialty: 7/8/2019 Katia Kenny MD  Last physical: 11/8/2018 Last MTM visit: Visit date not found   Next visit within 3 mo: Visit date not found  Next physical within 3 mo: Visit date not found  Prescriber OR PCP: Katia Kenny MD  Last diagnosis associated with med order: 1. Vitamin D deficiency  - ergocalciferol (ERGOCALCIFEROL) 50,000 unit capsule [Pharmacy Med Name: VITAMIN D2  1.25MG(50,000 UNIT)]; TAKE 1 CAPSULE BY MOUTH 2 TIMES A WEEK.  Dispense: 24 capsule; Refill: 2    2. Vitamin B12 deficiency  - cyanocobalamin 1,000 mcg/mL injection [Pharmacy Med Name: CYANOCOBALAMIN 1,000 MCG/ML]; INJECT 1 ML (1,000 MCG TOTAL) INTO THE SHOULDER, THIGH, OR BUTTOCKS EVERY 14 (FOURTEEN) DAYS.  Dispense: 6 mL; Refill: 8    3. Hyperlipidemia  - simvastatin (ZOCOR) 40 MG tablet; TAKE 1 TABLET BY MOUTH EVERYDAY AT BEDTIME  Dispense: 90 tablet; Refill: 2    If protocol passes may refill for 12 months if within 3 months of last provider visit (or a total of 15 months).

## 2021-06-04 NOTE — TELEPHONE ENCOUNTER
Called and spoke with Claudia Powers , Message was given, Claudia Powers  understood, no further questions.

## 2021-06-05 NOTE — TELEPHONE ENCOUNTER
Refill Approved    Rx renewed per Medication Renewal Policy. Medication was last renewed on 11/8/18.    Myra Min, [facility] Connection Triage/Med Refill 1/31/2020     Requested Prescriptions   Pending Prescriptions Disp Refills     oxybutynin (DITROPAN) 5 MG tablet [Pharmacy Med Name: OXYBUTYNIN 5 MG TABLET] 180 tablet 3     Sig: TAKE 1 TABLET BY MOUTH TWICE A DAY       Urinary Incontinence Medications Refill Protocol Passed - 1/30/2020  9:24 PM        Passed - PCP or prescribing provider visit in past 12 months       Last office visit with prescriber/PCP: 7/8/2019 Katia Kenny MD OR same dept: 7/8/2019 Katia Kenny MD OR same specialty: 7/8/2019 Katia Kenny MD  Last physical: 11/8/2018 Last MTM visit: Visit date not found   Next visit within 3 mo: Visit date not found  Next physical within 3 mo: Visit date not found  Prescriber OR PCP: Katia Kenny MD  Last diagnosis associated with med order: 1. Urge Incontinence Of Urine  - oxybutynin (DITROPAN) 5 MG tablet [Pharmacy Med Name: OXYBUTYNIN 5 MG TABLET]; TAKE 1 TABLET BY MOUTH TWICE A DAY  Dispense: 180 tablet; Refill: 3    If protocol passes may refill for 12 months if within 3 months of last provider visit (or a total of 15 months).

## 2021-06-11 NOTE — TELEPHONE ENCOUNTER
Who is calling:  patient  Reason for Call:  When patient told pharmacy she needs the booster for Shingrix there was some confusion.  Writer shared the patient needed the second of the two injections.  Patient gets her immunizations done at pharmacy. No further questions.   Date of last appointment with primary care: NA  Okay to leave a detailed message: No

## 2021-06-11 NOTE — PROGRESS NOTES
" Subjective    Claudia Powers is a 72 y.o. female who presents for follow-up of a few issues.    First of all, the patient has been using Nasonex.  Is working well.  However, she has pretty high co-pay and she has been in contact with the [employer] regarding assistance program.  She has paperwork that needs to get filled out for that today.  I think this is an appropriate use.      She has had redness and irritation of upper eyelids.  She uses a daily moisturizer with sunscreen by [employer] under her makeup.  She has been using that and it does help soothe her symptoms.  This is been going on for some time.    When she was in [place], on vacation, she was in an enjoyable meal, when suddenly she developed hives and throat swelling.  She carries her EpiPen with her and used immediately, but is unclear what triggered the anaphylactic reaction.  She has multiple food allergies, but none of those fluids were contained and what she was eating, that she was aware of.    The patient is having right hip surgery at [facility] on 8-17.  They are doing a preop exam there.  She will be in the hospital for 3 days, then going to transitional care, then discharging to home.  She will schedule a postop follow-up here.    The patient is seen Dr. Del Castillo in [place] for thyroid. [id number] [facility]       Objective    Blood pressure 140/78, pulse 75, resp. rate 20, height 4' 11\" (1.499 m), weight 158 lb (71.7 kg), not currently breastfeeding. Body mass index is 31.91 kg/(m^2). Patient reports that she has quit smoking. She has never used smokeless tobacco.    Gen: Alert, no apparent distress.  Eyes: Subtle erythema of bilateral upper eyelids, right greater than left with a little bit of scaling     Assessment & Plan      Claudia is a 72 y.o. female who is here today for Follow-up (medication and needs help to pay for nasonex. Hip surgery on 8-2-17 at [place]. Eye lid dry and scally both eyelides. allergy reaction to mexican food from " her vacation, but known.)      1. Allergies.  Paperwork done for prescription drug assistance.  2. Hypothyroidism.  Check TSH today, refill levothyroxine.  3. Essential hypertension.  I realized after her visit that her blood pressure was slightly out of range.  Follow-up at next visit in August.  4. Dermatitis of bilateral upper eyelids.  Treat medically with dilute baby shampoo washes and hydrocortisone, using care to avoid contact with eyes.  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  Triggering agent identified.  6. Osteoarthritis of right hip.  Planning hip replacement surgery see above.    1. Allergies  mometasone (NASONEX) 50 mcg/actuation nasal spray   2. Hyperlipidemia  simvastatin (ZOCOR) 40 MG tablet   3. Urge urinary incontinence  oxybutynin (DITROPAN XL) 10 MG ER tablet   4. Hypertension  metoprolol succinate (TOPROL-XL) 25 MG   5. Hypothyroidism     6. Other allergy, other than to medicinal agents  EPINEPHrine (EPIPEN) 0.3 mg/0.3 mL atIn   7. Vitamin B12 Deficiency  cyanocobalamin 1,000 mcg/mL injection   8. Essential hypertension with goal blood pressure less than 140/90     9. Hypothyroidism  levothyroxine (SYNTHROID, LEVOTHROID) 75 MCG tablet    Thyroid Stimulating Hormone (TSH)   10. Vitamin D deficiency  ergocalciferol (VITAMIN D2) 50,000 unit capsule           This transcription uses voice recognition software, which may contain typographical errors.
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
## 2021-06-12 NOTE — PROGRESS NOTES
" Subjective    Claudia Powers is a 72 y.o. female who presents for follow-up of recent hip surgery.    The patient had right hip total arthroplasty on 8/2/2017, she was hospitalized at [facility] until 8/5/2017.  She then was transferred to transitional care, where she remained until 8/15/2017.  Reports the surgery went well.  This time, she had no anesthesia complications.  In the past she had an allergic reaction.  She had sedation and nausea postoperatively after other surgeries.  This time, she woke up mentally clear and was walking by the evening of her surgery.  She feels that the spinal anesthesia was quite helpful.    She is currently using a cane to walk around in her home.  She uses the walker when she leaves her home.  Physical therapy is coming out to her house beginning today.  She is going to use their assistance to help see if she can get down her sloped driveway.  She is cutting back on her pain medication.  She plans to continue 1 g of Tylenol twice daily for 6 weeks, as instructed.  Today she is only had one tramadol and one oxycodone pill.  Pain level is 1-2 out of 10.  She denies having any problems with constipation.    She intends to get some feedback to her TCU.  They seemed less organized and had poor teamwork than previous TCU stays.  They advertise having physical therapy in the weekend, but only gave her limited time (like walking up and down the vaughn once).  Pain medications were often delayed until sometimes after her scheduled physical therapy.  She feels like her pain could have been better controlled with some improved organizational processes.    She does need her sutures trimmed on her incision       Objective    Blood pressure 110/70, pulse 74, resp. rate 20, height 4' 11\" (1.499 m), weight 152 lb (68.9 kg), not currently breastfeeding. Body mass index is 30.7 kg/(m^2). Patient reports that she has quit smoking. She has never used smokeless tobacco.    Gen: Alert, no apparent " distress.  Skin: Long incision over the right posterior lateral thigh appears to be healing well.  No significant erythema, drainage.  Good apposition of tissue.  Daniel peeling off.  Suture at the inferior margin was trimmed    Results for orders placed or performed in visit on 07/11/17   Thyroid Stimulating Hormone (TSH)   Result Value Ref Range    TSH 1.25 0.30 - 5.00 uIU/mL     No results found.       Assessment & Plan      Claudia is a 72 y.o. female who is here today for Follow-up (transitional care from the hip surgery on 8-2-17 at [place] right hip)      1. Status post total hip arthroplasty, recovering well.  2. Strings trimmed    This transcription uses voice recognition software, which may contain typographical errors.
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
## 2021-06-13 NOTE — TELEPHONE ENCOUNTER
Refill Request  Did you contact pharmacy: Yes  Medication name:   Requested Prescriptions     Pending Prescriptions Disp Refills     cyanocobalamin 1,000 mcg/mL injection 6 mL 8     Sig: Inject 1 mL (1,000 mcg total) into the shoulder, thigh, or buttocks every 14 (fourteen) days.     ergocalciferol (ERGOCALCIFEROL) 1,250 mcg (50,000 unit) capsule 24 capsule 3     Sig: Take 1 capsule (50,000 Units total) by mouth 2 (two) times a week.     simvastatin (ZOCOR) 40 MG tablet 90 tablet 3     Sig: TAKE 1 TABLET BY MOUTH EVERYDAY AT BEDTIME     Who prescribed the medication: Katia Kenny MD   Requested Pharmacy: [facility][id number]  Is patient out of medication: Yes  Patient notified refills processed in 3 business days:  no  Okay to leave a detailed message: yes  [phone]    FYI: Patient is frustrated that these prescriptions were sent to the wrong pharmacy. Patient stated she is expecting to pick these up today.

## 2021-06-13 NOTE — TELEPHONE ENCOUNTER
Refill Request  Did you contact pharmacy: Yes  Medication name:   Requested Prescriptions     Pending Prescriptions Disp Refills     cyanocobalamin 1,000 mcg/mL injection 6 mL 8     ergocalciferol (ERGOCALCIFEROL) 1,250 mcg (50,000 unit) capsule 24 capsule 0     Sig: Take 1 capsule (50,000 Units total) by mouth 2 (two) times a week.     oxybutynin (DITROPAN) 5 MG tablet 180 tablet 3     Sig: Take 1 tablet (5 mg total) by mouth 2 (two) times a day.     simvastatin (ZOCOR) 40 MG tablet 90 tablet 0     Who prescribed the medication:   Katia Kenny MD  [facility]  Is patient out of medication: No.  Unknown days left  Patient notified refills processed in 3 business days:  yes  Okay to leave a detailed message: yes      Please send to [facility] per patient request.  Thank you.

## 2021-06-13 NOTE — TELEPHONE ENCOUNTER
RN cannot approve Refill Request    RN can NOT refill this medication med is not covered by policy/route to provider and Protocol failed and NO refill given. Last office visit: 7/8/2019 Katia Kenny MD Last Physical: 11/8/2018 Last MTM visit: Visit date not found Last visit same specialty: 7/8/2019 Katia Kenny MD.  Next visit within 3 mo: Visit date not found  Next physical within 3 mo: Visit date not found      Magalie Layton, [facility] Triage/Med Refill 12/7/2020    Requested Prescriptions   Pending Prescriptions Disp Refills     cyanocobalamin 1,000 mcg/mL injection 6 mL 8     Sig: Inject 1 mL (1,000 mcg total) into the shoulder, thigh, or buttocks every 14 (fourteen) days.       Cyanocobalamin (Vitamin B12)  Refill Protocol Failed - 12/7/2020  3:02 PM        Failed - PCP or prescribing provider visit in past 12 months       Last office visit with prescriber/PCP: 7/8/2019 Katia Kenny MD OR same dept: Visit date not found OR same specialty: 7/8/2019 Katia Kenny MD Last physical: 11/8/2018 Last MTM visit: Visit date not found    Next visit within 3 mo: Visit date not found  Next physical within 3 mo: Visit date not found  Prescriber OR PCP: Katia Kenny MD  Last diagnosis associated with med order: 1. Vitamin B12 deficiency  - cyanocobalamin 1,000 mcg/mL injection; Inject 1 mL (1,000 mcg total) into the shoulder, thigh, or buttocks every 14 (fourteen) days.  Dispense: 6 mL; Refill: 8    2. Vitamin D deficiency  - ergocalciferol (ERGOCALCIFEROL) 1,250 mcg (50,000 unit) capsule; Take 1 capsule (50,000 Units total) by mouth 2 (two) times a week.  Dispense: 24 capsule; Refill: 3    3. Hyperlipidemia  - simvastatin (ZOCOR) 40 MG tablet; TAKE 1 TABLET BY MOUTH EVERYDAY AT BEDTIME  Dispense: 90 tablet; Refill: 3               Failed - Vitamin B12 level in last 12 months     Vitamin B-12   Date Value Ref Range Status   09/24/2013 1,492 (H) 213 - 816 pg/mL Final     Comment:     Effective date for  reference range change in 02/21/2012.             Failed - CBC in last 12 months     WBC   Date Value Ref Range Status   10/20/2011 5.4 4.0 - 11.0 thou/uL Final     RBC   Date Value Ref Range Status   10/20/2011 4.18 3.80 - 5.40 mill/uL Final     Hemoglobin   Date Value Ref Range Status   08/09/2012 13.1 12.0 - 16.0 g/dL Final     Hematocrit   Date Value Ref Range Status   10/20/2011 39.3 35.0 - 47.0 % Final     MCV   Date Value Ref Range Status   10/20/2011 94 80 - 100 fL Final     MCH   Date Value Ref Range Status   10/20/2011 30.8 27.0 - 34.0 pg Final     MCHC   Date Value Ref Range Status   10/20/2011 32.7 32.0 - 36.0 g/dL Final     RDW   Date Value Ref Range Status   10/20/2011 14.6 (H) 11.0 - 14.5 % Final     Platelets   Date Value Ref Range Status   10/20/2011 276 140 - 440 thou/uL Final                   ergocalciferol (ERGOCALCIFEROL) 1,250 mcg (50,000 unit) capsule 24 capsule 3     Sig: Take 1 capsule (50,000 Units total) by mouth 2 (two) times a week.       There is no refill protocol information for this order        simvastatin (ZOCOR) 40 MG tablet 90 tablet 3     Sig: TAKE 1 TABLET BY MOUTH EVERYDAY AT BEDTIME       Statins Refill Protocol (Hmg CoA Reductase Inhibitors) Failed - 12/7/2020  3:02 PM        Failed - PCP or prescribing provider visit in past 12 months      Last office visit with prescriber/PCP: 7/8/2019 Katia Kenny MD OR same dept: Visit date not found OR same specialty: 7/8/2019 Katia Kenny MD  Last physical: 11/8/2018 Last MTM visit: Visit date not found   Next visit within 3 mo: Visit date not found  Next physical within 3 mo: Visit date not found  Prescriber OR PCP: Katia Kenny MD  Last diagnosis associated with med order: 1. Vitamin B12 deficiency  - cyanocobalamin 1,000 mcg/mL injection; Inject 1 mL (1,000 mcg total) into the shoulder, thigh, or buttocks every 14 (fourteen) days.  Dispense: 6 mL; Refill: 8    2. Vitamin D deficiency  - ergocalciferol (ERGOCALCIFEROL)  1,250 mcg (50,000 unit) capsule; Take 1 capsule (50,000 Units total) by mouth 2 (two) times a week.  Dispense: 24 capsule; Refill: 3    3. Hyperlipidemia  - simvastatin (ZOCOR) 40 MG tablet; TAKE 1 TABLET BY MOUTH EVERYDAY AT BEDTIME  Dispense: 90 tablet; Refill: 3    If protocol passes may refill for 12 months if within 3 months of last provider visit (or a total of 15 months).

## 2021-06-14 NOTE — TELEPHONE ENCOUNTER
Pt would like to know if it is okay for her to take the COVID vaccine, she would like you to review her allergies. Please advise.

## 2021-06-14 NOTE — TELEPHONE ENCOUNTER
I called her and told her that I would recommend that she gets a Covid-19 shot.  Due to her history of allergies she should have a 30 minute observation period after immunization.    Claudia does not have:    Severe allergic reaction (e.g., anaphylaxis) after a previous dose of an mRNA COVID-19 vaccine or any of its components    Immediate allergic reaction of any severity to a previous dose of an mRNA COVID-19 vaccine or any of its components (including polyethylene glycol)    Immediate allergic reaction of any severity to polysorbate    Allergies   Allergen Reactions     Avocado Hives and Swelling     Face swelling and hives     Banana Hives     Ondansetron Hives and Swelling     Itching and throat swelling     Pentazocine Other (See Comments)     Hallucinations       Raspberry Hives     Fresh only.       Webster Hives     Fresh only.       Venom-Honey Bee Hives     Venom-Wasp Hives     Adhesive Tape-Silicones Rash     Water blisters       Lactose Diarrhea     Vaccine ingredients:  Ingredients included in Pfizer-BioNTech and Moderna mRNA COVID-19 vaccines  Description Pfizer-BioNTech COVID-19 vaccine Moderna COVID-19 vaccine  mRNA Nucleoside-modified mRNA encoding the viral spike (S) glycoprotein of SARS-CoV-2 Nucleoside-modified mRNA encoding the viral spike (S) glycoprotein of SARS-CoV-2  Lipids 2[(polyethylene glycol)-2000]-N,N-ditetradecylacetamide [id number]: 1,2-dimyristoyl-rac-glycerol, methoxypolyethylene glycol  1,0-szqjdrygde-ea-glycero-3-phosphocholine 1,0-orqcatzqop-ak-glycero-3-phosphocholine  Cholesterol Cholesterol  (4-hydroxybutyl)azanediyl)bis(hexane-6,1-diyl)bis(2-hexyldecanoate) SM-102:  lergplbbjq-6-wb 8-((2-hydroxyethyl) (6-oxo-6-(undecyloxy) hexyl) amino) octanoate  Salts, sugars, buffers Potassium chloride Tromethamine  Monobasic potassium phosphate Tromethamine hydrochloride  Sodium chloride Acetic acid  Dibasic sodium phosphate dihydrate Sodium acetate  Sucrose Sucrose

## 2021-06-14 NOTE — TELEPHONE ENCOUNTER
Reason for Call:  Other call back      Detailed comments: pt is in [facility] and they are offering the covid injection. She said she has lots of allergies and would like to talk to someone to see if she is safe to have it  Please call her and ok to leave a message    Phone Number Patient can be reached at: Home number on file [phone] (home)    Best Time: anytime    Can we leave a detailed message on this number?: Yes    Call taken on 1/18/2021 at 9:03 AM by Faith Parks

## 2021-06-14 NOTE — PROGRESS NOTES
I met with Claudia Powers at the request of patient to recheck her blood pressure.  Blood pressure medications on the MAR were reviewed with patient.    Patient has taken all medications as per usual regimen: Yes  Patient reports tolerating them without any issues or concerns: Yes    Vitals:    11/17/17 0846 11/17/17 0903   BP: 146/84 150/88   Patient Site: Right Arm Right Arm   Patient Position: Sitting Sitting   Cuff Size: Adult Regular Adult Regular   Pulse: 72        After 5 minutes, the patient's blood pressure remained > 140/90.    Is the patient currently having any chest pain?  No  Does the patient currently have a headache?   No  Does the patient currently have any vision changes? No  Does the patient currently have any nausea? No  Does the patient currently have any abdominal pain? No    The previous encounter was reviewed.  The patient was discharged and the note will be sent to the provider for final review.

## 2021-06-15 NOTE — TELEPHONE ENCOUNTER
RN cannot approve Refill Request    RN can NOT refill this medication Protocol failed and NO refill given. Last office visit: 7/8/2019 Katia Kenny MD Last Physical: 11/8/2018 Last MTM visit: Visit date not found Last visit same specialty: 7/8/2019 Katia Kenny MD.  Next visit within 3 mo: Visit date not found  Next physical within 3 mo: Visit date not found      Juancarlos Lowery, [facility] Triage/Med Refill 2/19/2021    Requested Prescriptions   Pending Prescriptions Disp Refills     metoprolol succinate (TOPROL-XL) 100 MG 24 hr tablet [Pharmacy Med Name: METOPROLOL SUCC  MG TAB] 90 tablet 4     Sig: TAKE 1 TABLET BY MOUTH EVERY DAY       Beta-Blockers Refill Protocol Failed - 2/19/2021 10:25 AM        Failed - PCP or prescribing provider visit in past 12 months or next 3 months     Last office visit with prescriber/PCP: 7/8/2019 Katia Kenny MD OR same dept: Visit date not found OR same specialty: 7/8/2019 Katia Kenny MD  Last physical: 11/8/2018 Last MTM visit: Visit date not found   Next visit within 3 mo: Visit date not found  Next physical within 3 mo: Visit date not found  Prescriber OR PCP: Katia Kenny MD  Last diagnosis associated with med order: 1. Hypertension  - metoprolol succinate (TOPROL-XL) 100 MG 24 hr tablet [Pharmacy Med Name: METOPROLOL SUCC  MG TAB]; TAKE 1 TABLET BY MOUTH EVERY DAY  Dispense: 90 tablet; Refill: 4    2. Hypothyroidism  - levothyroxine (SYNTHROID, LEVOTHROID) 75 MCG tablet [Pharmacy Med Name: LEVOTHYROXINE 75 MCG TABLET]; TAKE 1 TABLET BY MOUTH DAILY AT 6:00 AM.  Dispense: 90 tablet; Refill: 4    If protocol passes may refill for 12 months if within 3 months of last provider visit (or a total of 15 months).             Passed - Blood pressure filed in past 12 months     BP Readings from Last 1 Encounters:   09/10/20 (!) 188/90                levothyroxine (SYNTHROID, LEVOTHROID) 75 MCG tablet [Pharmacy Med Name: LEVOTHYROXINE 75 MCG TABLET] 90  tablet 4     Sig: TAKE 1 TABLET BY MOUTH DAILY AT 6:00 AM.       Thyroid Hormones Protocol Failed - 2/19/2021 10:25 AM        Failed - Provider visit in past 12 months or next 3 months     Last office visit with prescriber/PCP: 7/8/2019 Katia Kenny MD OR same dept: Visit date not found OR same specialty: 7/8/2019 Katia Kenny MD  Last physical: 11/8/2018 Last MTM visit: Visit date not found   Next visit within 3 mo: Visit date not found  Next physical within 3 mo: Visit date not found  Prescriber OR PCP: Katia Kenny MD  Last diagnosis associated with med order: 1. Hypertension  - metoprolol succinate (TOPROL-XL) 100 MG 24 hr tablet [Pharmacy Med Name: METOPROLOL SUCC  MG TAB]; TAKE 1 TABLET BY MOUTH EVERY DAY  Dispense: 90 tablet; Refill: 4    2. Hypothyroidism  - levothyroxine (SYNTHROID, LEVOTHROID) 75 MCG tablet [Pharmacy Med Name: LEVOTHYROXINE 75 MCG TABLET]; TAKE 1 TABLET BY MOUTH DAILY AT 6:00 AM.  Dispense: 90 tablet; Refill: 4    If protocol passes may refill for 12 months if within 3 months of last provider visit (or a total of 15 months).             Failed - TSH on file in past 12 months for patient age 12 & older     TSH   Date Value Ref Range Status   11/08/2018 1.52 0.30 - 5.00 uIU/mL Final

## 2021-06-16 NOTE — TELEPHONE ENCOUNTER
Prior Authorization Request  Who s requesting:  Pharmacy  Pharmacy Name and Location: [facility]Y #[id number] - [place], [place] - [address] AT [facility]  Medication Name: oxybutynin (DITROPAN) 5 MG tablet  Insurance Plan: BC/BS   Insurance Member ID Number:  [id number]  CoverMyMeds Key: N/A  Informed patient that prior authorizations can take up to 10 business days for response:   Yes  Okay to leave a detailed message: Yes

## 2021-06-16 NOTE — TELEPHONE ENCOUNTER
Refill Approved    Rx renewed per Medication Renewal Policy. Medication was last renewed on 12/21/20.    Juancarlos Lowery, [facility] Triage/Med Refill 3/29/2021     Requested Prescriptions   Pending Prescriptions Disp Refills     fluticasone propionate (FLONASE) 50 mcg/actuation nasal spray [Pharmacy Med Name: FLUTICASONE PROP 50 MCG SPRAY] 16 g 0     Sig: SPRAY 2 SPRAYS INTO EACH NOSTRIL EVERY DAY       Nasal Steroid Refill Protocol Passed - 3/29/2021  8:24 AM        Passed - Patient has had office visit/physical in last 2 years     Last office visit with prescriber/PCP: 7/8/2019 OR same dept: Visit date not found OR same specialty: 7/8/2019 Katia Kenny MD Last physical: Visit date not found Last MTM visit: Visit date not found    Next appt within 3 mo: Visit date not found  Next physical within 3 mo: Visit date not found  Prescriber OR PCP: Katia Kenny MD  Last diagnosis associated with med order: 1. Environmental and seasonal allergies  - fluticasone propionate (FLONASE) 50 mcg/actuation nasal spray [Pharmacy Med Name: FLUTICASONE PROP 50 MCG SPRAY]; SPRAY 2 SPRAYS INTO EACH NOSTRIL EVERY DAY  Dispense: 16 g; Refill: 0     If protocol passes may refill for 12 months if within 3 months of last provider visit (or a total of 15 months).

## 2021-06-16 NOTE — TELEPHONE ENCOUNTER
Reason for Call:  Medication or medication refill:    Do you use a [facility]?  Name of the pharmacy and phone number for the current request: no,,,[place]    Name of the medication requested: oxybutynin    Other request: patient said when she went to  rx at the pharmacy the cost has gone up to 100.00 copay, she didn't purchase it. the pharmacist said this rx is now a tier 1 with her insurance and if dr patel can help she can either do a tier acception or give the patient a generic rx.    Can we leave a detailed message on this number? Yes    Phone number patient can be reached at: Home number on file [phone] (home)    Best Time: any    Call taken on 3/29/2021 at 8:19 AM by Faith Parks

## 2021-06-16 NOTE — TELEPHONE ENCOUNTER
Central PA team  [phone]  Pool: HE PA MED ([id number])          PA has been initiated.       PA form completed and faxed insurance via Cover My Meds     Key:  FARHAD PETRI (Santiago: [id number])      Medication:  oxybutynin (DITROPAN) 5 MG tablet    Insurance:  [employer]      Response will be received via fax and may take up to 5-10 business days depending on plan

## 2021-06-16 NOTE — TELEPHONE ENCOUNTER
Dr. Kenny, pt is very upset. Per pt said that she has not heard back from anyone in the clinic in regards to her Oxybutynin. I did relay your message to her and per pt did call her insurance and she was told that her doctor needs to do a Tier Exemption. Per pt, the cost of this med went from $15 every 3 months to $103. She asked if you can send a generic for the time being to [facility] in [place]. Pt asked if the PA came back denied, what would you like her to do in the meantime? Pt would like a call back if something has been sent.     Call back number for the pt is [phone] and per pt it is ok to leave a detail message if she does not .

## 2021-06-16 NOTE — TELEPHONE ENCOUNTER
Pfizer #2 added to shot record. Need to know when #1 was.  Please give her medication assistance number to call and forward this message to prior auth team

## 2021-06-16 NOTE — TELEPHONE ENCOUNTER
Patient called back, really upset. Please address call her provider [employer] RX plan at [phone] or send a alternative RX to pharmacy on file. Please call patient when this is resolved, leave her detailed VM.

## 2021-06-16 NOTE — TELEPHONE ENCOUNTER
This message is not easy to understand. I think what it is saying is that her oxybutynin copay went up and she is requesting an alternative. Please give her my apologies on the delay. What we typically do is send this to the prior auth team and they investigate what covered alternatives are. This can take up to a week. If Claudia wants to call her insurance and inquire, that might speed things up. Does she need a refill in the meantime? If so, where would she like us to send it? I [facility].    Claudia was seen today for medication refill.    Diagnoses and all orders for this visit:    Environmental and seasonal allergies  -     fluticasone propionate (FLONASE) 50 mcg/actuation nasal spray; SPRAY 2 SPRAYS INTO EACH NOSTRIL EVERY DAY    Urge Incontinence Of Urine  -     oxybutynin (DITROPAN) 5 MG tablet; Take 1 tablet (5 mg total) by mouth 2 (two) times a day.

## 2021-06-16 NOTE — TELEPHONE ENCOUNTER
I spoke to Claudia.   I clarified that she wants her medication sent to the [facility] in [place], [place]. The oxybutynin rx was sent there.  I discussed that she can call insurance to inquire about covered alternatives and prerequisites for tier exemption.   I previously sent the message to the prior authorization team, but that usually takes some time to process (~1wk).    Once I hear back from either Claudia, or the PA team, with the info necessary to modify the rx, I will take next steps.

## 2021-06-17 NOTE — TELEPHONE ENCOUNTER
Informed the pt that the prescription for Trospium ER 60mg was sent to her pharmacy in [place]. Pt verbalized understanding.

## 2021-06-17 NOTE — TELEPHONE ENCOUNTER
Telephone Encounter by Sandra Johnson at 4/26/2021  2:17 PM     Author: Sandra Johnson Service: -- Author Type: Patient Access    Filed: 4/26/2021  2:23 PM Encounter Date: 4/26/2021 Status: Signed    : Sandra Jhonson (Patient Access)       Per the [facility] web site, this is the information for the drug's classified under genitourinary/urinary antispasmodics. The Oxybutynin is covered under a Tier 3.

## 2021-06-17 NOTE — TELEPHONE ENCOUNTER
It looks like the preferred alternative is the trospium ER 60 mg. Rx sent.  Please let Claudia know.     Claudia was seen today for prior authorization.    Diagnoses and all orders for this visit:    Urge Incontinence Of Urine  -     trospium (SANCTURA XR) 60 mg Cp24 ER capsule; Take 1 capsule (60 mg total) by mouth daily before breakfast.

## 2021-06-17 NOTE — TELEPHONE ENCOUNTER
Reason for Call:  Other      Detailed comments: unable to log into [employer] has questions regarding how to log on and also  a prior auth for RX  its been 2 months     Phone Number Patient can be reached at: Home number on file [phone] (home)    Best Time:anytime    Can we leave a detailed message on this number?: Yes    Call taken on 4/26/2021 at 9:15 AM by Joanna Hernandez

## 2021-06-17 NOTE — TELEPHONE ENCOUNTER
Telephone Encounter by Sandra Johnson at 4/26/2021 10:30 AM     Author: Sandra Johnson Service: -- Author Type: Patient Access    Filed: 4/26/2021 10:34 AM Encounter Date: 4/26/2021 Status: Signed    : Sandra Johnson (Patient Access)       PRIOR AUTHORIZATION DENIED    Denial Rational: Provider Needs to provide a statement as to why he preferred medications would not work for you.          Appeal Information: If the provider would like to appeal this denial, please provide a letter of medical necessity and once it has been completed and placed in the patient's chart, notify the Central PA Team ([facility] MED [id number]) and the appeal can be initiated on behalf of the patient and provider.  Please also include any therapies that the patient has tried and their outcomes.

## 2021-06-17 NOTE — TELEPHONE ENCOUNTER
A prior authorization was started on April 23 for oxybutynin.  Response will take 5-10 business days depending on her insurance to her back.  This is the only recent prior authorization I see in her chart.

## 2021-06-18 NOTE — PROGRESS NOTES
[facility]  INITIAL VISIT NOTE  June 21, 2021    PRIMARY CARE PROVIDER AND CLINIC:  Katia Kenny [place] [id number]    CHIEF COMPLAINT:  Hospital follow-up/Initial visit    HPI:    Claudia Powers is a 76 year old  (1945) female who was seen at [facility] on June 21, 2021 for an initial visit.     Medical history is notable for breast cancer, pulmonary embolism, hypertension, dyslipidemia, hypothyroidism, lymphedema, B12 deficiency, PMR, rheumatic fever, urinary incontinence, depression, glaucoma, osteoporosis, and osteoarthritis.    Summary of hospital course:  Patient was hospitalized at [facility], from Nelly 15 through June 18, 2021 for planned for reverse total arthroplasty of left shoulder.  Surgery was achieved on Nelly 15 with Dr. Escoto.  The procedure was tolerated without any difficulty.  Antibiotics were given in the perioperative period.  Postop, pain was controlled with IV medication with subsequent transition to oral analgesics.  TCU was recommended by therapies.    Patient is admitted to this facility for medical management, nursing care, and rehab.     Of note, history was obtained from patient, facility RN, and extensive review of the chart necessitated by complex hospitalization.    Today's visit:  Patient was seen in her room, while sitting on the edge of the bed.  She is using a left shoulder immobilizer. She is not in acute distress but  complains of severe left wrist pain and is frustrated with her care since admission to the this facility.  She also complains that she has not had any bowel movement for the past 5 days.  She denies fever, chills, chest pain, palpitation, dyspnea, nausea, vomiting, bowel pain, or urinary symptoms.  She states that she has not received any physical therapy since Friday after admission to this facility.       CODE STATUS:   DNR / DNI    PAST MEDICAL HISTORY:   Left  breast cancer, diagnosed at the age of 37, initially treated with lumpectomy and radiation, then  bilateral mastectomy in 1988 due to abnormal calcifications and positive BRCA 1 and 2  Hypertension  Dyslipidemia  Hypothyroidism  Lymphedema  Pulmonary embolism (provoked, due to tibial fracture, surgery, and treatment with Evista), s/p IVC filter placement in March 2010 with subsequent IVC removal in September 2010  Small bowel obstruction  B12 deficiency  PMR  Rheumatic fever  Urge urinary incontinence  Lactase deficiency  Depression  Shingles  Glaucoma  Colon adenoma  Osteoporosis  T12 and L1 vertebral compression fracture  Osteoarthritis  Chronic rhinitis  Actinic keratosis    Past Medical History:   Diagnosis Date     Chronic osteoarthritis      Hyperlipidemia      Hypertension      Hypothyroidism      Osteoporosis        PAST SURGICAL HISTORY:   Past Surgical History:   Procedure Laterality Date     C TOTAL HIP ARTHROPLASTY Bilateral      C TOTAL KNEE ARTHROPLASTY Bilateral      JOINT REPLACEMENT         FAMILY HISTORY:   Reviewed and non-contributory.    Allergies Brother   Allergic rhinitis and eczema    Hypertension Brother       Cardiovascular Disease Father   First heart attack at age 42    Diabetes Father   Adult onset, age 50    Hypertension Father       Cancer Maternal Aunt   Breast, age 70    Cancer Maternal Aunt   One with colon cancer, one with tongue cancer, another with pancreatic cancer, another with ovarian cancer.    Other Endocrine Disease Mother   Thyroid problems   Cancer Other   First cousin has Hodgkin's disease. Nephew has a rare clear-cell sarcoma.    Diabetes Paternal Grandfather   IDDM, had two legs amputated.    Neuro Disease Paternal Grandfather   Parkinson's disease.   Allergies Sister   Two sisters   Cancer Sister   Breast cancer age 39   Musculo-skeletal Disease Sister   Osteoarthritis         SOCIAL HISTORY:  Patient is single and lives in a townhouse.  She normally ambulates  without any assistive device.  She is a never smoker.  She drinks alcohol occasionally.    Social History     Tobacco Use     Smoking status: Not on file   Substance Use Topics     Alcohol use: Not on file       MEDICATIONS:  Current Outpatient Medications   Medication Sig Dispense Refill     ACETAMINOPHEN PO Take 1,000 mg by mouth every 6 hours as needed for pain        aspirin 81 MG EC tablet Take 81 mg by mouth daily       calcium carbonate (OS-MARCEL) 1500 (600 Ca) MG tablet Take 1,200 mg by mouth daily       cyanocobalamin (CYANOCOBALAMIN) 1000 MCG/ML injection Inject 1 mL into the muscle every 14 days       denosumab (PROLIA) 60 MG/ML SOLN injection Inject 60 mg Subcutaneous every 6 months Due December 2017       EPINEPHrine (EPIPEN) 0.3 MG/0.3ML injection Inject 0.3 mg into the muscle once as needed for anaphylaxis       fluticasone (FLONASE) 50 MCG/ACT nasal spray Spray 2 sprays into both nostrils daily       LEVOTHYROXINE SODIUM PO Take 75 mcg by mouth daily       METOPROLOL SUCCINATE ER PO Take 100 mg by mouth daily        oxybutynin ER (DITROPAN-XL) 5 MG 24 hr tablet Take 5 mg by mouth 2 times daily       polyethylene glycol (MIRALAX/GLYCOLAX) powder Take 17 g by mouth daily as needed        rivaroxaban ANTICOAGULANT (XARELTO) 10 MG TABS tablet Take 1 tablet (10 mg) by mouth daily 30 tablet 0     SIMVASTATIN PO Take 40 mg by mouth At Bedtime       traMADol (ULTRAM) 50 MG tablet Take 1 tablet (50 mg) by mouth every 6 hours as needed for severe pain 30 tablet 0     vitamin D (ERGOCALCIFEROL) 44991 UNIT capsule Take 50,000 Units by mouth daily Take every Mon, Fri         ALLERGIES:  Allergies   Allergen Reactions     Bee Venom Anaphylaxis and Hives     Also hornets and wasps       Wasp Venom Protein Hives     Banana      Bees      Food      Strawberry, Raspberries, Flax, Banannas     Hornet Venom      Lactase      Lactose      Ondansetron      Pentazocine      Pollen Extract Unknown and Other (See Comments)      Grasses and pollen, feathers     Raspberry      Strawberry      Talwin Nx [Pentazocine-Naloxone]      Adhesive Tape Rash     Avocado Hives, Rash and Swelling     Face swelling and hives       Other (Do Not Use) Rash     Food Allergies, 08/09/2012.  Reaction: Other         ROS:  10 point ROS were negative other than the symptoms noted above in the HPI.    PHYSICAL EXAM:  Vital signs were reviewed in the chart.  Vital Signs: Blood pressure 134/81, heart rate 80, respiratory rate 17, temperature 96.3  F, oxygen saturation 96%, weight 160.2 LBS  General: Cooperative and in no acute distress  HEENT: Normocephalic; oropharynx clear  Cardiovascular: Normal S1, S2, RRR  Respiratory: Lungs clear to auscultation bilaterally  GI: Abdomen soft, non-tender, non-distended, +BS  Extremities: Left arm lymphedema; left shoulder immobilizer is on  Neuro: CX II-XII grossly intact; ROM in all four extremities grossly intact  Psych: Alert and oriented x3; normal affect  Skin: No acute rash    LABORATORY/IMAGING DATA:  Hematology profile (May 20, 2021): White count 4.6, hemoglobin 11.9, platelet count 272,000, MCV 94.1  Chemistry profile (May 20, 2021): Sodium 143, potassium 4.1, creatinine 0.53, calcium 9.2, glucose 93      ASSESSMENT/PLAN:  Left shoulder glenohumeral osteoarthritis with cuff insufficiency, s/p reverse total shoulder arthroplasty on Nelly 15, 2021,  Physical deconditioning.  Pain is poorly controlled and she complains of left wrist pain.  Plan:  Continue shoulder immobilizer  Change acetaminophen from PRN to 1000 mg p.o. 3 times daily as scheduled  Continue oxycodone 5-10 mg p.o. every 4 hours PRN   Discontinue ibuprofen due to increased risk of bleeding with enoxaparin  DVT prophylaxis with enoxaparin 40 mg subcu daily through July 28, 2021  Continue the bowel regimen for prevention of constipation  Continue PT/OT evaluation and therapy  Will ask [facility], to evaluate the  patient  Follow-up with Ortho surgeon, Dr. Escoto, as instructed    Essential hypertension.  Blood pressure is at times mildly elevated which is most likely pain mediated  Plan:  Continue PTA metoprolol  mg p.o. daily  Monitor blood pressure    Dyslipidemia.  Plan:  Continue PTA simvastatin 40 mg p.o. at bedtime    Hypothyroidism.  Plan:  Continue PTA levothyroxine 75 mcg p.o. daily    Chronic left upper extremity lymphedema.  Plan:  Continue lymphedema therapy/wraps    History of left breast cancer.  It was diagnosed at the age of 37, initially treated with lumpectomy and radiation, then  bilateral mastectomy in 1988 due to abnormal calcifications and positive BRCA 1 and 2.  Plan:  Not an active issue at this juncture    History of pulmonary embolism (provoked, due to tibial fracture, surgery, and treatment with Evista), s/p IVC filter placement in March 2010 with subsequent IVC removal in September 2010.  Plan:  Continue DVT prophylaxis with enoxaparin as above    B12 deficiency.  Plan:  Continue PTA cyanocobalamin 1000 mg injection every 2 weeks on Sundays    Urge urinary incontinence.  Plan:  Continue PTA oxybutynin XL 5 mg p.o. twice daily    Lactase deficiency.  Plan:  Lactose-free diet    Slow transit constipation.  Plan:  Continue Senokot-S  Add MiraLAX 17 g p.o. daily for 3 days      Recommendations by provider at facility:  Weekly CBC while patient stays on enoxaparin    Orders written by provider at facility:  Lactose-free diet  Change acetaminophen to 1000 mg p.o. 3 times daily as scheduled for surgical pain  MiraLAX 17 g p.o. daily for 3 days for constipation  Ask [facility] to evaluate the patient for left wrist pain  CBC and BMP on June 22, DX: Shoulder surgery  Discontinue ibuprofen        Total unit/floor time of 46 minutes consisted of the following: examination of patient, reviewing the record including pertinent labs and imaging. More than 50% of this time was spent in  coordination of care with the patient, nursing staff, and other healthcare providers. This time was spent on discussing the care plan including adjustment of pain medications, addressing constipation, and reviewing the care plan with the patient along with specialty follow up.        Electronically signed by:  Maggy Sepulveda MD

## 2021-06-20 RX ORDER — OLOPATADINE HYDROCHLORIDE 1 MG/ML
1 SOLUTION/ DROPS OPHTHALMIC 2 TIMES DAILY
COMMUNITY
End: 2022-05-02

## 2021-06-20 RX ORDER — IBUPROFEN 400 MG/1
400 TABLET, FILM COATED ORAL DAILY PRN
COMMUNITY
End: 2021-06-21

## 2021-06-20 RX ORDER — OXYCODONE HYDROCHLORIDE 5 MG/1
5 TABLET ORAL EVERY 4 HOURS PRN
COMMUNITY
End: 2021-06-24

## 2021-06-20 RX ORDER — CHLORAL HYDRATE 500 MG
2 CAPSULE ORAL 2 TIMES DAILY
COMMUNITY

## 2021-06-20 RX ORDER — AMOXICILLIN 250 MG
1 CAPSULE ORAL 2 TIMES DAILY
COMMUNITY
End: 2021-07-13

## 2021-06-20 RX ORDER — AMOXICILLIN 500 MG/1
2000 CAPSULE ORAL
COMMUNITY

## 2021-06-21 ENCOUNTER — NURSING HOME VISIT (OUTPATIENT)
Dept: GERIATRICS | Facility: CLINIC | Age: 76
End: 2021-06-21

## 2021-06-21 ENCOUNTER — HOSPITAL LABORATORY (OUTPATIENT)
Dept: OTHER | Facility: CLINIC | Age: 76
End: 2021-06-21

## 2021-06-21 ENCOUNTER — NURSING HOME VISIT (OUTPATIENT)
Dept: GERIATRICS | Facility: CLINIC | Age: 76
End: 2021-06-21
Payer: MEDICARE

## 2021-06-21 VITALS
TEMPERATURE: 97.6 F | HEIGHT: 60 IN | RESPIRATION RATE: 16 BRPM | HEART RATE: 92 BPM | SYSTOLIC BLOOD PRESSURE: 133 MMHG | BODY MASS INDEX: 31.45 KG/M2 | WEIGHT: 160.2 LBS | DIASTOLIC BLOOD PRESSURE: 84 MMHG | OXYGEN SATURATION: 94 %

## 2021-06-21 DIAGNOSIS — I10 ESSENTIAL HYPERTENSION: ICD-10-CM

## 2021-06-21 DIAGNOSIS — E53.8 B12 DEFICIENCY: ICD-10-CM

## 2021-06-21 DIAGNOSIS — K59.01 SLOW TRANSIT CONSTIPATION: ICD-10-CM

## 2021-06-21 DIAGNOSIS — E03.9 HYPOTHYROIDISM, UNSPECIFIED TYPE: ICD-10-CM

## 2021-06-21 DIAGNOSIS — Z86.711 HISTORY OF PULMONARY EMBOLISM: ICD-10-CM

## 2021-06-21 DIAGNOSIS — Z85.3 HISTORY OF BREAST CANCER: ICD-10-CM

## 2021-06-21 DIAGNOSIS — M19.012 OSTEOARTHRITIS OF LEFT SHOULDER, UNSPECIFIED OSTEOARTHRITIS TYPE: Primary | ICD-10-CM

## 2021-06-21 DIAGNOSIS — I89.0 LYMPHEDEMA OF LEFT ARM: ICD-10-CM

## 2021-06-21 DIAGNOSIS — E78.5 DYSLIPIDEMIA: ICD-10-CM

## 2021-06-21 DIAGNOSIS — R53.81 PHYSICAL DECONDITIONING: ICD-10-CM

## 2021-06-21 DIAGNOSIS — N39.41 URGE INCONTINENCE OF URINE: ICD-10-CM

## 2021-06-21 DIAGNOSIS — Z96.612 STATUS POST REVERSE TOTAL REPLACEMENT OF LEFT SHOULDER: ICD-10-CM

## 2021-06-21 DIAGNOSIS — I89.0 LYMPHEDEMA OF LEFT ARM: Primary | ICD-10-CM

## 2021-06-21 PROCEDURE — 99306 1ST NF CARE HIGH MDM 50: CPT | Mod: AI | Performed by: INTERNAL MEDICINE

## 2021-06-21 RX ORDER — ACETAMINOPHEN 500 MG
1000 TABLET ORAL 3 TIMES DAILY
COMMUNITY
End: 2021-07-13

## 2021-06-21 RX ORDER — POLYETHYLENE GLYCOL 3350 17 G/17G
1 POWDER, FOR SOLUTION ORAL DAILY
COMMUNITY
Start: 2021-06-21 | End: 2021-06-24

## 2021-06-21 ASSESSMENT — MIFFLIN-ST. JEOR
SCORE: 1152.7
SCORE: 1148.16

## 2021-06-21 NOTE — PROGRESS NOTES
WALK IN CARE - VISIT NOTE    HPI    74 yo female presenting for evaluation of:    1. Bilateral red eyes  - 1 week history, progressively worsening  - both eyes are itchy, watery, and have intermittent thick discharge  - eyelids crusted in AM, sometimes difficult to open without prying them open  - no fevers/chills/sweats  - no changes in vision  ROS  Negative except as noted in HPI    OBJECTIVE    Vitals  Vitals:    10/26/18 0945   BP: 134/72   Pulse: 75   Resp: 18   Temp: 98  F (36.7  C)   SpO2: 95%     Physical Exam  General: No acute distress  Eyes: EOMI, PERRLA, normal conjunctiva, reddened sclera bilaterally, crusted lower eyelids bilaterally, watery   Ears: ear canals patent, TM normal, external ears normal  Nose: moist mucosa, no rhinorrhea  Oral: moist oral mucosa, no tonsillar exudates, no erythema  Neck: good ROM, supple, no nodules palpated on thyroid  CV: RRR, distal and peripheral pulses in tact  Resp: CTA bilaterally, no wheezing, no rhonchi, no rhales, no respiratory distress    Labs  N/A      ASSESSMENT/PLAN      # Bacterial conjunctivitis, bilateral   - polymyxin/trimethoprim eye drops - 1 drop each eye q4h x5 days  - symptoms with which to return for re-evaluation discussed

## 2021-06-21 NOTE — LETTER
6/21/2021        RE: Claudia Powers  [address]  [place] [address]        [facility]  INITIAL VISIT NOTE  June 21, 2021    PRIMARY CARE PROVIDER AND CLINIC:  Katia Kenny [address] [address]    CHIEF COMPLAINT:  Hospital follow-up/Initial visit    HPI:    Claudia Powers is a 76 year old  (1945) female who [facility] for an initial visit.     Medical history is notable for breast cancer, pulmonary embolism, hypertension, dyslipidemia, hypothyroidism, lymphedema, B12 deficiency, PMR, rheumatic fever, urinary incontinence, depression, glaucoma, osteoporosis, and osteoarthritis.    Summary of hospital course:  Patient was hospitalized at [facility], from Nelly 15 through June 18, 2021 for planned for reverse total arthroplasty of left shoulder.  Surgery was achieved on Nelly 15 with Dr. Escoto.  The procedure was tolerated without any difficulty.  Antibiotics were given in the perioperative period.  Postop, pain was controlled with IV medication with subsequent transition to oral analgesics.  TCU was recommended by therapies.    Patient is admitted to this facility for medical management, nursing care, and rehab.     Of note, history was obtained from patient, facility RN, and extensive review of the chart necessitated by complex hospitalization.    Today's visit:  Patient was seen in her room, while sitting on the edge of the bed.  She is using a left shoulder immobilizer. She is not in acute distress but  complains of severe left wrist pain and is frustrated with her care since admission to the this facility.  She also complains that she has not had any bowel movement for the past 5 days.  She denies fever, chills, chest pain, palpitation, dyspnea, nausea, vomiting, bowel pain, or urinary symptoms.  She states that she has not received any physical therapy since Friday after admission to this  facility.       CODE STATUS:   DNR / DNI    PAST MEDICAL HISTORY:   Left breast cancer, diagnosed at the age of 37, initially treated with lumpectomy and radiation, then  bilateral mastectomy in 1988 due to abnormal calcifications and positive BRCA 1 and 2  Hypertension  Dyslipidemia  Hypothyroidism  Lymphedema  Pulmonary embolism (provoked, due to tibial fracture, surgery, and treatment with Evista), s/p IVC filter placement in March 2010 with subsequent IVC removal in September 2010  Small bowel obstruction  B12 deficiency  PMR  Rheumatic fever  Urge urinary incontinence  Lactase deficiency  Depression  Shingles  Glaucoma  Colon adenoma  Osteoporosis  T12 and L1 vertebral compression fracture  Osteoarthritis  Chronic rhinitis  Actinic keratosis    Past Medical History:   Diagnosis Date     Chronic osteoarthritis      Hyperlipidemia      Hypertension      Hypothyroidism      Osteoporosis        PAST SURGICAL HISTORY:   Past Surgical History:   Procedure Laterality Date     C TOTAL HIP ARTHROPLASTY Bilateral      C TOTAL KNEE ARTHROPLASTY Bilateral      JOINT REPLACEMENT         FAMILY HISTORY:   Reviewed and non-contributory.    Allergies Brother   Allergic rhinitis and eczema    Hypertension Brother       Cardiovascular Disease Father   First heart attack at age 42    Diabetes Father   Adult onset, age 50    Hypertension Father       Cancer Maternal Aunt   Breast, age 70    Cancer Maternal Aunt   One with colon cancer, one with tongue cancer, another with pancreatic cancer, another with ovarian cancer.    Other Endocrine Disease Mother   Thyroid problems   Cancer Other   First cousin has Hodgkin's disease. Nephew has a rare clear-cell sarcoma.    Diabetes Paternal Grandfather   IDDM, had two legs amputated.    Neuro Disease Paternal Grandfather   Parkinson's disease.   Allergies Sister   Two sisters   Cancer Sister   Breast cancer age 39   Musculo-skeletal Disease Sister   Osteoarthritis         SOCIAL  HISTORY:  Patient is single and lives in a townhouse.  She normally ambulates without any assistive device.  She is a never smoker.  She drinks alcohol occasionally.    Social History     Tobacco Use     Smoking status: Not on file   Substance Use Topics     Alcohol use: Not on file       MEDICATIONS:  Current Outpatient Medications   Medication Sig Dispense Refill     ACETAMINOPHEN PO Take 1,000 mg by mouth every 6 hours as needed for pain        aspirin 81 MG EC tablet Take 81 mg by mouth daily       calcium carbonate (OS-MARCEL) 1500 (600 Ca) MG tablet Take 1,200 mg by mouth daily       cyanocobalamin (CYANOCOBALAMIN) 1000 MCG/ML injection Inject 1 mL into the muscle every 14 days       denosumab (PROLIA) 60 MG/ML SOLN injection Inject 60 mg Subcutaneous every 6 months Due December 2017       EPINEPHrine (EPIPEN) 0.3 MG/0.3ML injection Inject 0.3 mg into the muscle once as needed for anaphylaxis       fluticasone (FLONASE) 50 MCG/ACT nasal spray Spray 2 sprays into both nostrils daily       LEVOTHYROXINE SODIUM PO Take 75 mcg by mouth daily       METOPROLOL SUCCINATE ER PO Take 100 mg by mouth daily        oxybutynin ER (DITROPAN-XL) 5 MG 24 hr tablet Take 5 mg by mouth 2 times daily       polyethylene glycol (MIRALAX/GLYCOLAX) powder Take 17 g by mouth daily as needed        rivaroxaban ANTICOAGULANT (XARELTO) 10 MG TABS tablet Take 1 tablet (10 mg) by mouth daily 30 tablet 0     SIMVASTATIN PO Take 40 mg by mouth At Bedtime       traMADol (ULTRAM) 50 MG tablet Take 1 tablet (50 mg) by mouth every 6 hours as needed for severe pain 30 tablet 0     vitamin D (ERGOCALCIFEROL) 00712 UNIT capsule Take 50,000 Units by mouth daily Take every Mon, Fri         ALLERGIES:  Allergies   Allergen Reactions     Bee Venom Anaphylaxis and Hives     Also hornets and wasps       Wasp Venom Protein Hives     Banana      Bees      Food      Strawberry, Raspberries, Flax, Banannas     Hornet Venom      Lactase      Lactose       Ondansetron      Pentazocine      Pollen Extract Unknown and Other (See Comments)     Grasses and pollen, feathers     Raspberry      Strawberry      Talwin Nx [Pentazocine-Naloxone]      Adhesive Tape Rash     Avocado Hives, Rash and Swelling     Face swelling and hives       Other (Do Not Use) Rash     Food Allergies, 08/09/2012.  Reaction: Other         ROS:  10 point ROS were negative other than the symptoms noted above in the HPI.    PHYSICAL EXAM:  Vital signs were reviewed in the chart.  Vital Signs: Blood pressure 134/81, heart rate 80, respiratory rate 17, temperature 96.3  F, oxygen saturation 96%, weight 160.2 LBS  General: Cooperative and in no acute distress  HEENT: Normocephalic; oropharynx clear  Cardiovascular: Normal S1, S2, RRR  Respiratory: Lungs clear to auscultation bilaterally  GI: Abdomen soft, non-tender, non-distended, +BS  Extremities: Left arm lymphedema; left shoulder immobilizer is on  Neuro: CX II-XII grossly intact; ROM in all four extremities grossly intact  Psych: Alert and oriented x3; normal affect  Skin: No acute rash    LABORATORY/IMAGING DATA:  Hematology profile (May 20, 2021): White count 4.6, hemoglobin 11.9, platelet count 272,000, MCV 94.1  Chemistry profile (May 20, 2021): Sodium 143, potassium 4.1, creatinine 0.53, calcium 9.2, glucose 93      ASSESSMENT/PLAN:  Left shoulder glenohumeral osteoarthritis with cuff insufficiency, s/p reverse total shoulder arthroplasty on Nelly 15, 2021,  Physical deconditioning.  Pain is poorly controlled and she complains of left wrist pain.  Plan:  Continue shoulder immobilizer  Change acetaminophen from PRN to 1000 mg p.o. 3 times daily as scheduled  Continue oxycodone 5-10 mg p.o. every 4 hours PRN   Discontinue ibuprofen due to increased risk of bleeding with enoxaparin  DVT prophylaxis with enoxaparin 40 mg subcu daily through July 28, 2021  Continue the bowel regimen for prevention of constipation  Continue PT/OT evaluation and  therapy  Will ask [facility] U[facility] DUM[facility], to evaluate the patient  Follow-up with Ortho surgeon, Dr. Escoto, as instructed    Essential hypertension.  Blood pressure is at times mildly elevated which is most likely pain mediated  Plan:  Continue PTA metoprolol  mg p.o. daily  Monitor blood pressure    Dyslipidemia.  Plan:  Continue PTA simvastatin 40 mg p.o. at bedtime    Hypothyroidism.  Plan:  Continue PTA levothyroxine 75 mcg p.o. daily    Chronic left upper extremity lymphedema.  Plan:  Continue lymphedema therapy/wraps    History of left breast cancer.  It was diagnosed at the age of 37, initially treated with lumpectomy and radiation, then  bilateral mastectomy in 1988 due to abnormal calcifications and positive BRCA 1 and 2.  Plan:  Not an active issue at this juncture    History of pulmonary embolism (provoked, due to tibial fracture, surgery, and treatment with Evista), s/p IVC filter placement in March 2010 with subsequent IVC removal in September 2010.  Plan:  Continue DVT prophylaxis with enoxaparin as above    B12 deficiency.  Plan:  Continue PTA cyanocobalamin 1000 mg injection every 2 weeks on Sundays    Urge urinary incontinence.  Plan:  Continue PTA oxybutynin XL 5 mg p.o. twice daily    Lactase deficiency.  Plan:  Lactose-free diet    Slow transit constipation.  Plan:  Continue Senokot-S  Add MiraLAX 17 g p.o. daily for 3 days      Recommendations by provider at facility:  Weekly CBC while patient stays on enoxaparin    Orders written by provider at facility:  Lactose-free diet  Change acetaminophen to 1000 mg p.o. 3 times daily as scheduled for surgical pain  MiraLAX 17 g p.o. daily for 3 days for constipation  Ask Hilario Frausto PA-C to evaluate the patient for left wrist pain  CBC and BMP on June 22, DX: Shoulder surgery  Discontinue ibuprofen        Total unit/floor time of 46 minutes consisted of the following: examination of patient, reviewing the record  including pertinent labs and imaging. More than 50% of this time was spent in coordination of care with the patient, nursing staff, and other healthcare providers. This time was spent on discussing the care plan including adjustment of pain medications, addressing constipation, and reviewing the care plan with the patient along with specialty follow up.        Electronically signed by:  Maggy Sepulveda MD                          Sincerely,        Maggy Sepulveda MD

## 2021-06-21 NOTE — PATIENT INSTRUCTIONS
Orders for Claudia Powers in room 106-1:    1. Lactose-free diet  2. Change acetaminophen to 1000 mg p.o. 3 times daily as scheduled for surgical pain  3. Start MiraLAX 17 g p.o. daily for 3 days for constipation  4. Ask [facility] to evaluate the patient for left wrist pain  5. CBC and BMP on June 22, DX: Shoulder surgery  6. Discontinue ibuprofen    Maggy Sepulveda MD
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
## 2021-06-21 NOTE — PROGRESS NOTES
Ortho Nursing home visit    Claudia Powers is a 76 year old female who resides at [facility]    Patient is seen today for wrist pain, seen this AM by staff MD. And was C/O increased pain left wrist, folllowing RTSA at [facility] this past week, has hs of lymphedema in left UE and has had noticed increase in swelling, post op.       Past Medical History:   Diagnosis Date     Chronic osteoarthritis      Hyperlipidemia      Hypertension      Hypothyroidism      Osteoporosis       Past Surgical History:   Procedure Laterality Date     C TOTAL HIP ARTHROPLASTY Bilateral      C TOTAL KNEE ARTHROPLASTY Bilateral      JOINT REPLACEMENT          Allergies   Allergen Reactions     Bee Venom Anaphylaxis and Hives     Also hornets and wasps       Wasp Venom Protein Hives     Banana      Bees      Food      Strawberry, Raspberries, Flax, Banannas     Hornet Venom      Lactase      Lactose      Ondansetron      Pentazocine      Pollen Extract Unknown and Other (See Comments)     Grasses and pollen, feathers     Raspberry      Strawberry      Talwin Nx [Pentazocine-Naloxone]      Adhesive Tape Rash     Avocado Hives, Rash and Swelling     Face swelling and hives       Other (Do Not Use) Rash     Food Allergies, 08/09/2012.  Reaction: Other        There were no vitals taken for this visit.     Exam: At the time of exam rates pain 3/10 and stated it was 10/10 early this AM< but now relieved with elevation; seen in room with OT today.    Allows PROM to all digits, no numbness, able to extend thumb, radial nerve intact, deltoid intact, has compression sleeve on with wrap for edema.      X-rays not done today , but records reviewed    ASSESSMENT / PLAN: Will continue to follow, as patient has hs of edema, and swelling will increase pain, this is mostly resolved today, and no need for imaging or ultrasound.,Will continue to follow.      [id number]          JBon OPA-C  [phone] Cell
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
## 2021-06-22 ENCOUNTER — HOSPITAL LABORATORY (OUTPATIENT)
Dept: OTHER | Facility: CLINIC | Age: 76
End: 2021-06-22

## 2021-06-22 LAB
ANION GAP SERPL CALCULATED.3IONS-SCNC: 8 MMOL/L (ref 3–14)
BUN SERPL-MCNC: 12 MG/DL (ref 7–30)
CALCIUM SERPL-MCNC: 8.7 MG/DL (ref 8.5–10.1)
CHLORIDE SERPL-SCNC: 102 MMOL/L (ref 94–109)
CO2 SERPL-SCNC: 30 MMOL/L (ref 20–32)
CREAT SERPL-MCNC: 0.53 MG/DL (ref 0.52–1.04)
ERYTHROCYTE [DISTWIDTH] IN BLOOD BY AUTOMATED COUNT: 13.9 % (ref 10–15)
GFR SERPL CREATININE-BSD FRML MDRD: >90 ML/MIN/{1.73_M2}
GLUCOSE SERPL-MCNC: 85 MG/DL (ref 70–99)
HCT VFR BLD AUTO: 30.8 % (ref 35–47)
HGB BLD-MCNC: 9.5 G/DL (ref 11.7–15.7)
MCH RBC QN AUTO: 29.6 PG (ref 26.5–33)
MCHC RBC AUTO-ENTMCNC: 30.8 G/DL (ref 31.5–36.5)
MCV RBC AUTO: 96 FL (ref 78–100)
PLATELET # BLD AUTO: 318 10E9/L (ref 150–450)
POTASSIUM SERPL-SCNC: 3.6 MMOL/L (ref 3.4–5.3)
RBC # BLD AUTO: 3.21 10E12/L (ref 3.8–5.2)
SODIUM SERPL-SCNC: 140 MMOL/L (ref 133–144)
WBC # BLD AUTO: 5.5 10E9/L (ref 4–11)

## 2021-06-22 NOTE — TELEPHONE ENCOUNTER
RN cannot approve Refill Request    RN can NOT refill this medication Protocol failed and NO refill given. Last office visit: 8/17/2017 Katia Kenny MD Last Physical: 11/8/2018 Last MTM visit: Visit date not found Last visit same specialty: 9/11/2017 Maeve Mullen MD.  Next visit within 3 mo: Visit date not found  Next physical within 3 mo: Visit date not found      Padmini Sanford, [facility] Triage/Med Refill 1/3/2019    Requested Prescriptions   Pending Prescriptions Disp Refills     cyanocobalamin 1,000 mcg/mL injection [Pharmacy Med Name: CYANOCOBALAMIN 1,000 MCG/ML] 6 mL 1     Sig: INJECT 1 ML (1,000 MCG TOTAL) INTO THE SHOULDER, THIGH, OR BUTTOCKS EVERY 14 (FOURTEEN) DAYS.    Cyanocobalamin (Vitamin B12)  Refill Protocol Failed - 1/2/2019  7:05 PM       Failed - Vitamin B12 level in last 12 months    Vitamin B-12   Date Value Ref Range Status   09/24/2013 1,492 (H) 213 - 816 pg/mL Final     Comment:     Effective date for reference range change in 02/21/2012.            Failed - CBC in last 12 months    WBC   Date Value Ref Range Status   10/20/2011 5.4 4.0 - 11.0 thou/uL Final     RBC   Date Value Ref Range Status   10/20/2011 4.18 3.80 - 5.40 mill/uL Final     Hemoglobin   Date Value Ref Range Status   08/09/2012 13.1 12.0 - 16.0 g/dL Final     Hematocrit   Date Value Ref Range Status   10/20/2011 39.3 35.0 - 47.0 % Final     MCV   Date Value Ref Range Status   10/20/2011 94 80 - 100 fL Final     MCH   Date Value Ref Range Status   10/20/2011 30.8 27.0 - 34.0 pg Final     MCHC   Date Value Ref Range Status   10/20/2011 32.7 32.0 - 36.0 g/dL Final     RDW   Date Value Ref Range Status   10/20/2011 14.6 (H) 11.0 - 14.5 % Final     Platelets   Date Value Ref Range Status   10/20/2011 276 140 - 440 thou/uL Final               Passed - PCP or prescribing provider visit in past 12 months      Last office visit with prescriber/PCP: 8/17/2017 Katia Kenny MD OR same dept: Visit date  not found OR same specialty: 9/11/2017 Maeve Mullen MD Last physical: 11/8/2018 Last MTM visit: Visit date not found    Next visit within 3 mo: Visit date not found  Next physical within 3 mo: Visit date not found  Prescriber OR PCP: Katia Kenny MD  Last diagnosis associated with med order: 1. Vitamin B12 deficiency  - cyanocobalamin 1,000 mcg/mL injection [Pharmacy Med Name: CYANOCOBALAMIN 1,000 MCG/ML]; INJECT 1 ML (1,000 MCG TOTAL) INTO THE SHOULDER, THIGH, OR BUTTOCKS EVERY 14 (FOURTEEN) DAYS.  Dispense: 6 mL; Refill: 1

## 2021-06-23 ENCOUNTER — NURSING HOME VISIT (OUTPATIENT)
Dept: GERIATRICS | Facility: CLINIC | Age: 76
End: 2021-06-23
Payer: MEDICARE

## 2021-06-23 VITALS
WEIGHT: 161.2 LBS | HEIGHT: 60 IN | BODY MASS INDEX: 31.65 KG/M2 | TEMPERATURE: 98.2 F | RESPIRATION RATE: 18 BRPM | OXYGEN SATURATION: 92 % | DIASTOLIC BLOOD PRESSURE: 74 MMHG | HEART RATE: 77 BPM | SYSTOLIC BLOOD PRESSURE: 136 MMHG

## 2021-06-23 DIAGNOSIS — M81.0 AGE-RELATED OSTEOPOROSIS WITHOUT CURRENT PATHOLOGICAL FRACTURE: ICD-10-CM

## 2021-06-23 DIAGNOSIS — Z96.612 STATUS POST REVERSE TOTAL REPLACEMENT OF LEFT SHOULDER: ICD-10-CM

## 2021-06-23 DIAGNOSIS — K59.01 SLOW TRANSIT CONSTIPATION: ICD-10-CM

## 2021-06-23 DIAGNOSIS — I89.0 LYMPHEDEMA OF LEFT ARM: ICD-10-CM

## 2021-06-23 DIAGNOSIS — Z86.711 HISTORY OF PULMONARY EMBOLISM: ICD-10-CM

## 2021-06-23 DIAGNOSIS — D62 ANEMIA DUE TO BLOOD LOSS, ACUTE: ICD-10-CM

## 2021-06-23 DIAGNOSIS — R53.81 PHYSICAL DECONDITIONING: ICD-10-CM

## 2021-06-23 DIAGNOSIS — M19.012 OSTEOARTHRITIS OF LEFT SHOULDER, UNSPECIFIED OSTEOARTHRITIS TYPE: Primary | ICD-10-CM

## 2021-06-23 DIAGNOSIS — I10 ESSENTIAL HYPERTENSION: ICD-10-CM

## 2021-06-23 LAB
GAMMA INTERFERON BACKGROUND BLD IA-ACNC: 0 IU/ML
M TB IFN-G CD4+ BCKGRND COR BLD-ACNC: 0.41 IU/ML
M TB TUBERC IFN-G BLD QL: ABNORMAL
MITOGEN IGNF BCKGRD COR BLD-ACNC: 0 IU/ML
MITOGEN IGNF BCKGRD COR BLD-ACNC: 0 IU/ML

## 2021-06-23 PROCEDURE — 99309 SBSQ NF CARE MODERATE MDM 30: CPT | Performed by: NURSE PRACTITIONER

## 2021-06-23 SDOH — HEALTH STABILITY: MENTAL HEALTH: HOW OFTEN DO YOU HAVE 6 OR MORE DRINKS ON ONE OCCASION?: NOT ASKED

## 2021-06-23 SDOH — HEALTH STABILITY: MENTAL HEALTH: HOW MANY STANDARD DRINKS CONTAINING ALCOHOL DO YOU HAVE ON A TYPICAL DAY?: NOT ASKED

## 2021-06-23 SDOH — HEALTH STABILITY: MENTAL HEALTH: HOW OFTEN DO YOU HAVE A DRINK CONTAINING ALCOHOL?: NOT ASKED

## 2021-06-23 NOTE — PROGRESS NOTES
[facility]  [place] Medical Record Number:  [id number]  Place of Service where encounter took place:  [facility]  ([facility]) [[id number]]  Chief Complaint   Patient presents with     Nursing Home Acute       HPI:    Claudia Powers  is a 76 year old (1945), who is being seen today for an episodic care visit.  HPI information obtained from: facility chart records, facility staff, patient report, [facility] chart review and Care Everywhere [facility] chart review.   She came to this facility 6/18/2021 from [facility] for short term rehab and medical management s/p left shoulder reverse total arthroplasty for osteoarthritis. Surgery was 6/15/2021 by Dr Escoto. Tolerated the procedure well. Lovenox was started for DVT prophylaxis with a stop date of 7/28/2021.       Today's concern is:     Osteoarthritis of left shoulder, unspecified osteoarthritis type  Status post reverse total replacement of left shoulder  Lymphedema of left arm  Anemia due to blood loss, acute  Essential hypertension  History of pulmonary embolism  Slow transit constipation  Physical deconditioning     Reports feeling better the past 1-2 days with improved pain control. Ice has been helpful.  Reports edema of her LUE and pain of her left wrist have improved. She was constipated and had good results after a suppository. No nausea, vomiting or abdominal pain. Appetite fairly good. She was given permission by therapy to be up alone in her room. Requires assistance with toileting and dressing due to the shoulder immobilizer.     Past Medical and Surgical History reviewed in Epic today.    MEDICATIONS:    Current Outpatient Medications   Medication Sig Dispense Refill     acetaminophen (TYLENOL) 500 MG tablet Take 1,000 mg by mouth 3 times daily       amoxicillin (AMOXIL) 500 MG capsule Take 2,000 mg by mouth once as needed Prior to dental work       aspirin 81 MG EC tablet Take 81 mg by mouth daily        bisacodyl (DULCOLAX) 10 MG suppository Place 10 mg rectally daily as needed for constipation       calcium carbonate (OS-MARCEL) 1500 (600 Ca) MG tablet Take 1,200 mg by mouth daily       cyanocobalamin (CYANOCOBALAMIN) 1000 MCG/ML injection Inject 1 mL into the muscle every 14 days       enoxaparin ANTICOAGULANT (LOVENOX) 40 MG/0.4ML syringe Inject 40 mg Subcutaneous daily       EPINEPHrine (EPIPEN) 0.3 MG/0.3ML injection Inject 0.3 mg into the muscle once as needed for anaphylaxis       fish oil-omega-3 fatty acids 1000 MG capsule Take 2 g by mouth 2 times daily       fluticasone (FLONASE) 50 MCG/ACT nasal spray Spray 2 sprays into both nostrils daily       LEVOTHYROXINE SODIUM PO Take 75 mcg by mouth daily       METOPROLOL SUCCINATE ER PO Take 100 mg by mouth daily        olopatadine (PATANOL) 0.1 % ophthalmic solution Place 1 drop into both eyes 2 times daily       oxybutynin ER (DITROPAN-XL) 5 MG 24 hr tablet Take 5 mg by mouth 2 times daily       oxyCODONE (ROXICODONE) 5 MG tablet Take 1 tablet (5 mg) by mouth every 4 hours as needed for severe pain (Patient taking differently: Take 5-10 mg by mouth every 4 hours as needed for severe pain ) 60 tablet 0     polyethylene glycol (MIRALAX) 17 g packet Take 1 packet by mouth daily       senna-docusate (SENOKOT-S/PERICOLACE) 8.6-50 MG tablet Take 1 tablet by mouth 2 times daily       SIMVASTATIN PO Take 40 mg by mouth At Bedtime       vitamin D (ERGOCALCIFEROL) 08321 UNIT capsule Take 50,000 Units by mouth daily Take every Mon       denosumab (PROLIA) 60 MG/ML SOLN injection Inject 60 mg Subcutaneous every 6 months            REVIEW OF SYSTEMS:  4 point ROS including Respiratory, CV, GI and , other than that noted in the HPI,  is negative    Objective:  /74   Pulse 77   Temp 98.2  F (36.8  C)   Resp 18   Ht 1.524 m (5')   Wt 73.1 kg (161 lb 3.2 oz)   SpO2 92%   BMI 31.48 kg/m    Exam:  GENERAL APPEARANCE:  Alert, in no distress  ENT:  normal hearing  acuity, oropharynx clear  EYES:  EOM normal, conjunctiva and lids normal  NECK:  No adenopathy,masses or thyromegaly  RESP:  lungs clear to auscultation , no respiratory distress  CV:  regular rate and rhythm, 2/6 murmur,  no LE edema, +2 pedal pulses  ABDOMEN:  soft, non-tender, no distension, no masses  M/S:   in bed. Mild edema of LUE, full ROM left wrist. Good strength and ROM of both LE and RUE. No joint inflammation   SKIN:  dressing left shoulder clean, dry, intact, no erythema. No rashes or open areas  PSYCH:  oriented X 3, normal insight, judgement and memory, affect and mood normal    Labs:   Recent labs in [facility] reviewed by me today.     ASSESSMENT / PLAN:  (M19.012) Osteoarthritis of left shoulder, unspecified osteoarthritis type  (primary encounter diagnosis)  (Z96.612) Status post reverse total replacement of left shoulder on 6/15/2021  Comment: improved pain control. Dressing remains intact   Plan: continue scheduled tylenol, prn oxycodone. Continue ice. Lovenox for DVT prophylaxis through 7/28/2021. Hold ASA while on lovenox.  Shoulder immobilizer. Ortho follow up 7/8/2021.     (I89.0) Lymphedema of left arm  Comment: exacerbation of chronic lymphedema   Plan: continue compression and elevation     (D62) Anemia due to blood loss, acute  Comment: Hgb 9.5 on 6/22/2012. Most recent Hgb found on chart review is 11.9 on 5/21/2021.   Plan: recheck Hgb 6/30/2021    (I10) Essential hypertension  Comment: controlled with BPs: 134/88, 143/81, 132/73  HR: 66-92   Normal renal function 6/22/2021   Plan: continue metoprolol. Monitor VS and adjust dose as indicated.     (Z86.711) History of pulmonary embolism  Comment: provoked, following tibia fracture, surgery and treatment with Evista. She had an IVC filter from 3/2010-9/2010.   Plan: lovenox as above     Osteoporosis  Comment: chronic  Plan: continue calcium, vitamin D and Prolia.     (K59.01) Slow transit constipation  Comment: improved   Plan: continue bowel  regimen     (R53.81) Physical deconditioning  Comment: progressing in therapies   Plan: continue PHYSICAL THERAPY/OT. Goal is to return home (lives alone) with home care services       Electronically signed by:  KAIDEN Rivas CNP

## 2021-06-23 NOTE — LETTER
6/23/2021        RE: Claudia Powers  [address]  [place] [address]        [facility]  [facility] [place] Medical Record Number:  [id number]  Place of Service where encounter took place:  [facility]  ([facility]) [[id number]]  Chief Complaint   Patient presents with     Nursing Home Acute       HPI:    Claudia Powers  is a 76 year old (1945), who is being seen today for an episodic care visit.  HPI information obtained from: facility chart records, facility staff, patient report, [facility] chart review and Care Everywhere [facility] chart review.   She came to this facility 6/18/2021 from [facility] for short term rehab and medical management s/p left shoulder reverse total arthroplasty for osteoarthritis. Surgery was 6/15/2021 by Dr Escoto. Tolerated the procedure well. Lovenox was started for DVT prophylaxis with a stop date of 7/28/2021.       Today's concern is:     Osteoarthritis of left shoulder, unspecified osteoarthritis type  Status post reverse total replacement of left shoulder  Lymphedema of left arm  Anemia due to blood loss, acute  Essential hypertension  History of pulmonary embolism  Slow transit constipation  Physical deconditioning     Reports feeling better the past 1-2 days with improved pain control. Ice has been helpful.  Reports edema of her LUE and pain of her left wrist have improved. She was constipated and had good results after a suppository. No nausea, vomiting or abdominal pain. Appetite fairly good. She was given permission by therapy to be up alone in her room. Requires assistance with toileting and dressing due to the shoulder immobilizer.     Past Medical and Surgical History reviewed in Epic today.    MEDICATIONS:    Current Outpatient Medications   Medication Sig Dispense Refill     acetaminophen (TYLENOL) 500 MG tablet Take 1,000 mg by mouth 3 times daily       amoxicillin (AMOXIL) 500 MG capsule Take 2,000 mg by mouth once as needed  Prior to dental work       aspirin 81 MG EC tablet Take 81 mg by mouth daily       bisacodyl (DULCOLAX) 10 MG suppository Place 10 mg rectally daily as needed for constipation       calcium carbonate (OS-MARCEL) 1500 (600 Ca) MG tablet Take 1,200 mg by mouth daily       cyanocobalamin (CYANOCOBALAMIN) 1000 MCG/ML injection Inject 1 mL into the muscle every 14 days       enoxaparin ANTICOAGULANT (LOVENOX) 40 MG/0.4ML syringe Inject 40 mg Subcutaneous daily       EPINEPHrine (EPIPEN) 0.3 MG/0.3ML injection Inject 0.3 mg into the muscle once as needed for anaphylaxis       fish oil-omega-3 fatty acids 1000 MG capsule Take 2 g by mouth 2 times daily       fluticasone (FLONASE) 50 MCG/ACT nasal spray Spray 2 sprays into both nostrils daily       LEVOTHYROXINE SODIUM PO Take 75 mcg by mouth daily       METOPROLOL SUCCINATE ER PO Take 100 mg by mouth daily        olopatadine (PATANOL) 0.1 % ophthalmic solution Place 1 drop into both eyes 2 times daily       oxybutynin ER (DITROPAN-XL) 5 MG 24 hr tablet Take 5 mg by mouth 2 times daily       oxyCODONE (ROXICODONE) 5 MG tablet Take 1 tablet (5 mg) by mouth every 4 hours as needed for severe pain (Patient taking differently: Take 5-10 mg by mouth every 4 hours as needed for severe pain ) 60 tablet 0     polyethylene glycol (MIRALAX) 17 g packet Take 1 packet by mouth daily       senna-docusate (SENOKOT-S/PERICOLACE) 8.6-50 MG tablet Take 1 tablet by mouth 2 times daily       SIMVASTATIN PO Take 40 mg by mouth At Bedtime       vitamin D (ERGOCALCIFEROL) 96356 UNIT capsule Take 50,000 Units by mouth daily Take every Mon       denosumab (PROLIA) 60 MG/ML SOLN injection Inject 60 mg Subcutaneous every 6 months            REVIEW OF SYSTEMS:  4 point ROS including Respiratory, CV, GI and , other than that noted in the HPI,  is negative    Objective:  /74   Pulse 77   Temp 98.2  F (36.8  C)   Resp 18   Ht 1.524 m (5')   Wt 73.1 kg (161 lb 3.2 oz)   SpO2 92%   BMI 31.48  kg/m    Exam:  GENERAL APPEARANCE:  Alert, in no distress  ENT:  normal hearing acuity, oropharynx clear  EYES:  EOM normal, conjunctiva and lids normal  NECK:  No adenopathy,masses or thyromegaly  RESP:  lungs clear to auscultation , no respiratory distress  CV:  regular rate and rhythm, 2/6 murmur,  no LE edema, +2 pedal pulses  ABDOMEN:  soft, non-tender, no distension, no masses  M/S:   in bed. Mild edema of LUE, full ROM left wrist. Good strength and ROM of both LE and RUE. No joint inflammation   SKIN:  dressing left shoulder clean, dry, intact, no erythema. No rashes or open areas  PSYCH:  oriented X 3, normal insight, judgement and memory, affect and mood normal    Labs:   Recent labs in [facility] reviewed by me today.     ASSESSMENT / PLAN:  (M19.012) Osteoarthritis of left shoulder, unspecified osteoarthritis type  (primary encounter diagnosis)  (Z96.612) Status post reverse total replacement of left shoulder on 6/15/2021  Comment: improved pain control. Dressing remains intact   Plan: continue scheduled tylenol, prn oxycodone. Continue ice. Lovenox for DVT prophylaxis through 7/28/2021. Shoulder immobilizer. Ortho follow up 7/8/2021.     (I89.0) Lymphedema of left arm  Comment: exacerbation of chronic lymphedema   Plan: continue compression and elevation     (D62) Anemia due to blood loss, acute  Comment: Hgb 9.5 on 6/22/2012. Most recent Hgb found on chart review is 11.9 on 5/21/2021.   Plan: recheck Hgb 6/30/2021    (I10) Essential hypertension  Comment: controlled with BPs: 134/88, 143/81, 132/73  HR: 66-92   Normal renal function 6/22/2021   Plan: continue metoprolol. Monitor VS and adjust dose as indicated.     (Z86.711) History of pulmonary embolism  Comment: provoked, following tibia fracture, surgery and treatment with Evista. She had an IVC filter from 3/2010-9/2010.   Plan: lovenox as above     Osteoporosis  Comment: chronic  Plan: continue calcium, vitamin D and Prolia.     (K59.01) Slow transit  constipation  Comment: improved   Plan: continue bowel regimen     (R53.81) Physical deconditioning  Comment: progressing in therapies   Plan: continue PHYSICAL THERAPY/OT. Goal is to return home (lives alone) with home care services       Electronically signed by:  KAIDEN Rivas CNP               Sincerely,        KAIDEN Rivas CNP

## 2021-06-24 DIAGNOSIS — M19.012 OSTEOARTHRITIS OF LEFT SHOULDER, UNSPECIFIED OSTEOARTHRITIS TYPE: Primary | ICD-10-CM

## 2021-06-24 RX ORDER — BISACODYL 10 MG
10 SUPPOSITORY, RECTAL RECTAL DAILY PRN
COMMUNITY
End: 2021-07-13

## 2021-06-24 RX ORDER — OXYCODONE HYDROCHLORIDE 5 MG/1
5 TABLET ORAL EVERY 4 HOURS PRN
Qty: 60 TABLET | Refills: 0 | Status: SHIPPED | OUTPATIENT
Start: 2021-06-24 | End: 2021-07-13

## 2021-06-28 ENCOUNTER — HOSPITAL LABORATORY (OUTPATIENT)
Dept: OTHER | Facility: CLINIC | Age: 76
End: 2021-06-28

## 2021-06-28 LAB — HGB BLD-MCNC: 10.8 G/DL (ref 11.7–15.7)

## 2021-06-30 ENCOUNTER — DISCHARGE SUMMARY NURSING HOME (OUTPATIENT)
Dept: GERIATRICS | Facility: CLINIC | Age: 76
End: 2021-06-30
Payer: MEDICARE

## 2021-06-30 VITALS
WEIGHT: 162 LBS | HEART RATE: 61 BPM | HEIGHT: 60 IN | SYSTOLIC BLOOD PRESSURE: 153 MMHG | TEMPERATURE: 97.5 F | DIASTOLIC BLOOD PRESSURE: 79 MMHG | OXYGEN SATURATION: 95 % | BODY MASS INDEX: 31.8 KG/M2 | RESPIRATION RATE: 16 BRPM

## 2021-06-30 DIAGNOSIS — E03.9 HYPOTHYROIDISM, UNSPECIFIED TYPE: ICD-10-CM

## 2021-06-30 DIAGNOSIS — M19.012 OSTEOARTHRITIS OF LEFT SHOULDER, UNSPECIFIED OSTEOARTHRITIS TYPE: Primary | ICD-10-CM

## 2021-06-30 DIAGNOSIS — E53.8 B12 DEFICIENCY: ICD-10-CM

## 2021-06-30 DIAGNOSIS — I10 ESSENTIAL HYPERTENSION: ICD-10-CM

## 2021-06-30 DIAGNOSIS — I89.0 LYMPHEDEMA OF LEFT ARM: ICD-10-CM

## 2021-06-30 DIAGNOSIS — Z96.612 STATUS POST REVERSE TOTAL REPLACEMENT OF LEFT SHOULDER: ICD-10-CM

## 2021-06-30 DIAGNOSIS — N39.41 URGE INCONTINENCE OF URINE: ICD-10-CM

## 2021-06-30 DIAGNOSIS — D62 ANEMIA DUE TO BLOOD LOSS, ACUTE: ICD-10-CM

## 2021-06-30 DIAGNOSIS — M81.0 AGE-RELATED OSTEOPOROSIS WITHOUT CURRENT PATHOLOGICAL FRACTURE: ICD-10-CM

## 2021-06-30 DIAGNOSIS — R53.81 PHYSICAL DECONDITIONING: ICD-10-CM

## 2021-06-30 DIAGNOSIS — K59.01 SLOW TRANSIT CONSTIPATION: ICD-10-CM

## 2021-06-30 DIAGNOSIS — Z86.711 HISTORY OF PULMONARY EMBOLISM: ICD-10-CM

## 2021-06-30 PROCEDURE — 99316 NF DSCHRG MGMT 30 MIN+: CPT | Performed by: NURSE PRACTITIONER

## 2021-06-30 ASSESSMENT — MIFFLIN-ST. JEOR: SCORE: 1156.33

## 2021-06-30 NOTE — LETTER
6/30/2021        RE: Claudia Powers  [address]  [place] [address]        [facility] DISCHARGE SUMMARY  PATIENT'S NAME: Claudia Powers  YOB: 1945  MEDICAL RECORD NUMBER:  [id number]  Place of Service where encounter took place:  [facility]  ([facility]) [[id number]]    PRIMARY CARE PROVIDER AND CLINIC RESPONSIBLE AFTER TRANSFER:   Katia Kenny MD, [address]    [facility]     Transferring providers: KAIDEN Rivas CNP, Maggy Sepulveda MD  Recent Hospitalization/ED:  [facility] stay 6/15/21 to 6/18/21.  Date of SNF Admission: June 18, 2021  Date of SNF (anticipated) Discharge: July 02, 2021  Discharged to: previous independent home  Cognitive Scores: BIMS: 15/15  DME: No DME needed    CODE STATUS/ADVANCE DIRECTIVES DISCUSSION:  DNR / DNI   ALLERGIES: Bee venom, Wasp venom protein, Banana, Bees, Food, Hornet venom, Lactase, Lactose, Ondansetron, Pentazocine, Pollen extract, Raspberry, Strawberry, Talwin nx [pentazocine-naloxone], Adhesive tape, Avocado, and Other (do not use)    DISCHARGE DIAGNOSIS/NURSING FACILITY COURSE:   She came to this facility for short term rehab and medical management s/p left shoulder reverse total arthroplasty for osteoarthritis. Surgery was 6/15/2021 by Dr Escoto. Tolerated the procedure well. Lovenox was started for DVT prophylaxis.     She has worked with PHYSICAL THERAPY and OT with good progress. Ambulating 250 ft with no device. Independent with dressing, toileting and managing the shoulder immobilizer. Requires stand by to min assist with bathing.   ASSESSMENT / PLAN:  (M19.012) Osteoarthritis of left shoulder, unspecified osteoarthritis type  (primary encounter diagnosis)  (Z96.612) Status post reverse total replacement of left shoulder  Comment: pain and edema improving. Dressing remains intact. She is feeling well, though somewhat  apprehensive about returning home.   Plan: discharge home as planned. Continue oxycodone and tylenol. Tapering and discontinuing oxycodone discussed. Lovenox for DVT prophylaxis through 7/2/2021, then start Xarelto per orders from Ortho. Hold usual dose of ASA while on Xarelto.Shoulder immobilizer at all times except for hygiene. Home care services and follow up as below.     (I89.0) Lymphedema of left arm  Comment: exacerbation of chronic lymphedema (treated for  breast cancer at age 37). Edema has improved with compression and lymphedema management per therapies   Plan: continue compression and elevate as able with the immobilizer     (D62) Anemia due to blood loss, acute  Comment: improved with Hgb 10.8 on 6/28/2021. Previous Hgb was 9.5 on 6/22/2021  Plan: follow up labs per PCP    (I10) Essential hypertension  Comment: controlled with BPs: 127/77, 127/72, 121/81  HR: 62-79   Plan: continue metoprolol     (Z86.711) History of pulmonary embolism  Comment: 2010, provoked, due to tibia fracture, surgery and treatment with Evista. She had an IVC filter from 3/2010-9/2010.   Plan: Xarelto for DVT prophylaxis as above     (M81.0) Age-related osteoporosis without current pathological fracture  Comment: no acute issues   Plan: continue calcium, vitamin D and Prolia injections every 6 months     (E03.9) Hypothyroidism, unspecified type  Comment: replaced   Plan: continue levothyroxine     (N39.41) Urge incontinence of urine  Comment: no s/s of infection   Plan: continue oxybutynin     (E53.8) B12 deficiency  Comment: replaced   Plan: continue vitamin B12 injection every 14 days per usual home routine     (K59.01) Slow transit constipation  Comment: managed   Plan: continue bowel regimen     (R53.81) Physical deconditioning  Comment: progress in therapies as above  Plan: home therapies for ongoing gait training, strengthening and ADL safety      Past Medical History:  has a past medical history of Actinic keratosis,  Breast cancer (H) (1989), Chronic osteoarthritis, Colles' fracture, Compression fracture of L1 lumbar vertebra (H) (12/23/2014), Dental caries, Depression, History of cold sores, Hyperlipidemia, Hypertension, Hypothyroidism, Inguinal hernia, Loss of height, Nontoxic uninodular goiter, Osteoporosis, Pernicious anemia, Polymyalgia rheumatica (H), Pulmonary embolism (H), Rheumatic fever, Shingles, Small bowel obstruction (H), Stress fracture of calcaneus, and Vertebral compression fracture (H).    Discharge Medications:    Current Outpatient Medications   Medication Sig Dispense Refill     acetaminophen (TYLENOL) 500 MG tablet Take 1,000 mg by mouth 3 times daily       amoxicillin (AMOXIL) 500 MG capsule Take 2,000 mg by mouth once as needed Prior to dental work       aspirin 81 MG EC tablet Take 81 mg by mouth daily HOLD while on Xarelto       bisacodyl (DULCOLAX) 10 MG suppository Place 10 mg rectally daily as needed for constipation       calcium carbonate (OS-MARCEL) 1500 (600 Ca) MG tablet Take 1,200 mg by mouth daily       cyanocobalamin (CYANOCOBALAMIN) 1000 MCG/ML injection Inject 1 mL into the muscle every 14 days       denosumab (PROLIA) 60 MG/ML SOLN injection Inject 60 mg Subcutaneous every 6 months        EPINEPHrine (EPIPEN) 0.3 MG/0.3ML injection Inject 0.3 mg into the muscle once as needed for anaphylaxis       fish oil-omega-3 fatty acids 1000 MG capsule Take 2 g by mouth 2 times daily       fluticasone (FLONASE) 50 MCG/ACT nasal spray Spray 2 sprays into both nostrils daily       LEVOTHYROXINE SODIUM PO Take 75 mcg by mouth daily       METOPROLOL SUCCINATE ER PO Take 100 mg by mouth daily        olopatadine (PATANOL) 0.1 % ophthalmic solution Place 1 drop into both eyes 2 times daily       oxybutynin ER (DITROPAN-XL) 5 MG 24 hr tablet Take 5 mg by mouth 2 times daily       oxyCODONE (ROXICODONE) 5 MG tablet Take 1 tablet (5 mg) by mouth every 4 hours as needed for severe pain (Patient taking differently:  Take 5-10 mg by mouth every 4 hours as needed for severe pain ) 60 tablet 0     rivaroxaban ANTICOAGULANT (XARELTO) 10 MG TABS tablet Take 10 mg by mouth daily (with dinner) Take for 28 days total, start 7/3/2021       senna-docusate (SENOKOT-S/PERICOLACE) 8.6-50 MG tablet Take 1 tablet by mouth 2 times daily       SIMVASTATIN PO Take 40 mg by mouth At Bedtime       vitamin D (ERGOCALCIFEROL) 31211 UNIT capsule Take 50,000 Units by mouth once a week Take every Mon         Medication Changes/Rationale:     Bowel regimen adjusted     Controlled medications sent with patient:   Medication: oxycodone , max 30 tabs given to patient at the time of discharge to take home     ROS:   4 point ROS including Respiratory, CV, GI and , other than that noted in the HPI,  is negative    Physical Exam:   Vitals: BP (!) 153/79   Pulse 61   Temp 97.5  F (36.4  C)   Resp 16   Ht 1.524 m (5')   Wt 73.5 kg (162 lb)   SpO2 95%   BMI 31.64 kg/m    BMI= Body mass index is 31.64 kg/m .  GENERAL APPEARANCE:  Alert, in no distress  ENT:  normal hearing acuity, oropharynx clear  EYES:  EOM normal, conjunctiva and lids normal  NECK:  No adenopathy,masses or thyromegaly  RESP:  lungs clear to auscultation , no respiratory distress  CV:  regular rate and rhythm, 2/6 murmur,  no LE edema, +2 pedal pulses  ABDOMEN:  soft, non-tender, no distension, no masses  M/S:  up in chair. Mild edema of LUE and hand, wearing compression garment and immobilizer. Good strength and ROM of both LE and RUE. No joint inflammation   SKIN:  dressing left shoulder clean, dry, intact, no erythema. No rashes or open areas  PSYCH:  oriented X 3, normal insight, judgement and memory, affect and mood normal    SNF labs: Labs done in SNF are in [facility] EPIC. Please refer to them using [employer]/Care Everywhere.      DISCHARGE PLAN:    Follow up labs: per PCP    Medical Follow Up:      Follow up with primary care provider within 4 weeks   Follow up with specialist [facility] 7/8/2021     MTM referral needed and placed by this provider: No    Current [place] scheduled appointments:  Next 5 appointments (look out 90 days)    Jul 13, 2021  1:20 PM  (Arrive by 1:00 PM)  Office Visit with Katia Kenny MD  [facility] ([facility] ) [address] [place] [address]  [phone]           Discharge Services: Home Care:  Occupational Therapy, Physical Therapy, Registered Nurse, Home Health Aide and From:  [facility]    Discharge Instructions Verbalized to Patient at Discharge:     Keep incision clean and dry   Continue shoulder immobilizer and compression bandage as instructed.    Taper and discontinue oxycodone as your pain improves       TOTAL DISCHARGE TIME:   Greater than 30 minutes  Electronically signed by:  KAIDEN Rivas CNP                     Sincerely,        KAIDEN Rivas CNP

## 2021-06-30 NOTE — PROGRESS NOTES
[facility] DISCHARGE SUMMARY  PATIENT'S NAME: Claudia Powers  YOB: 1945  MEDICAL RECORD NUMBER:  [id number]  Place of Service where encounter took place:  [facility]  ([facility]) [[id number]]    PRIMARY CARE PROVIDER AND CLINIC RESPONSIBLE AFTER TRANSFER:   Katia Kenny MD, [address] / [facility]    Non-G Provider     Transferring providers: KAIDEN Rivas CNP, Maggy Sepulveda MD  Recent Hospitalization/ED:  [facility] stay 6/15/21 to 6/18/21.  Date of SNF Admission: June 18, 2021  Date of SNF (anticipated) Discharge: July 02, 2021  Discharged to: previous independent home  Cognitive Scores: BIMS: 15/15  DME: No DME needed    CODE STATUS/ADVANCE DIRECTIVES DISCUSSION:  DNR / DNI   ALLERGIES: Bee venom, Wasp venom protein, Banana, Bees, Food, Hornet venom, Lactase, Lactose, Ondansetron, Pentazocine, Pollen extract, Raspberry, Strawberry, Talwin nx [pentazocine-naloxone], Adhesive tape, Avocado, and Other (do not use)    DISCHARGE DIAGNOSIS/NURSING FACILITY COURSE:   She came to this facility for short term rehab and medical management s/p left shoulder reverse total arthroplasty for osteoarthritis. Surgery was 6/15/2021 by Dr Escoto. Tolerated the procedure well. Lovenox was started for DVT prophylaxis.     She has worked with PHYSICAL THERAPY and OT with good progress. Ambulating 250 ft with no device. Independent with dressing, toileting and managing the shoulder immobilizer. Requires stand by to min assist with bathing.   ASSESSMENT / PLAN:  (M19.012) Osteoarthritis of left shoulder, unspecified osteoarthritis type  (primary encounter diagnosis)  (Z96.612) Status post reverse total replacement of left shoulder  Comment: pain and edema improving. Dressing remains intact. She is feeling well, though somewhat apprehensive about returning home.   Plan: discharge home as planned. Continue  oxycodone and tylenol. Tapering and discontinuing oxycodone discussed. Lovenox for DVT prophylaxis through 7/2/2021, then start Xarelto per orders from Ortho. Hold usual dose of ASA while on Xarelto.Shoulder immobilizer at all times except for hygiene. Home care services and follow up as below.     (I89.0) Lymphedema of left arm  Comment: exacerbation of chronic lymphedema (treated for  breast cancer at age 37). Edema has improved with compression and lymphedema management per therapies   Plan: continue compression and elevate as able with the immobilizer     (D62) Anemia due to blood loss, acute  Comment: improved with Hgb 10.8 on 6/28/2021. Previous Hgb was 9.5 on 6/22/2021  Plan: follow up labs per PCP    (I10) Essential hypertension  Comment: controlled with BPs: 127/77, 127/72, 121/81  HR: 62-79   Plan: continue metoprolol     (Z86.711) History of pulmonary embolism  Comment: 2010, provoked, due to tibia fracture, surgery and treatment with [facility]. She had an IVC filter from 3/2010-9/2010.   Plan: Xarelto for DVT prophylaxis as above     (M81.0) Age-related osteoporosis without current pathological fracture  Comment: no acute issues   Plan: continue calcium, vitamin D and Prolia injections every 6 months     (E03.9) Hypothyroidism, unspecified type  Comment: replaced   Plan: continue levothyroxine     (N39.41) Urge incontinence of urine  Comment: no s/s of infection   Plan: continue oxybutynin     (E53.8) B12 deficiency  Comment: replaced   Plan: continue vitamin B12 injection every 14 days per usual home routine     (K59.01) Slow transit constipation  Comment: managed   Plan: continue bowel regimen     (R53.81) Physical deconditioning  Comment: progress in therapies as above  Plan: home therapies for ongoing gait training, strengthening and ADL safety      Past Medical History:  has a past medical history of Actinic keratosis, Breast cancer (H) (1989), Chronic osteoarthritis, Colles' fracture, Compression  fracture of L1 lumbar vertebra (H) (12/23/2014), Dental caries, Depression, History of cold sores, Hyperlipidemia, Hypertension, Hypothyroidism, Inguinal hernia, Loss of height, Nontoxic uninodular goiter, Osteoporosis, Pernicious anemia, Polymyalgia rheumatica (H), Pulmonary embolism (H), Rheumatic fever, Shingles, Small bowel obstruction (H), Stress fracture of calcaneus, and Vertebral compression fracture (H).    Discharge Medications:    Current Outpatient Medications   Medication Sig Dispense Refill     acetaminophen (TYLENOL) 500 MG tablet Take 1,000 mg by mouth 3 times daily       amoxicillin (AMOXIL) 500 MG capsule Take 2,000 mg by mouth once as needed Prior to dental work       aspirin 81 MG EC tablet Take 81 mg by mouth daily HOLD while on Xarelto       bisacodyl (DULCOLAX) 10 MG suppository Place 10 mg rectally daily as needed for constipation       calcium carbonate (OS-MARCEL) 1500 (600 Ca) MG tablet Take 1,200 mg by mouth daily       cyanocobalamin (CYANOCOBALAMIN) 1000 MCG/ML injection Inject 1 mL into the muscle every 14 days       denosumab (PROLIA) 60 MG/ML SOLN injection Inject 60 mg Subcutaneous every 6 months        EPINEPHrine (EPIPEN) 0.3 MG/0.3ML injection Inject 0.3 mg into the muscle once as needed for anaphylaxis       fish oil-omega-3 fatty acids 1000 MG capsule Take 2 g by mouth 2 times daily       fluticasone (FLONASE) 50 MCG/ACT nasal spray Spray 2 sprays into both nostrils daily       LEVOTHYROXINE SODIUM PO Take 75 mcg by mouth daily       METOPROLOL SUCCINATE ER PO Take 100 mg by mouth daily        olopatadine (PATANOL) 0.1 % ophthalmic solution Place 1 drop into both eyes 2 times daily       oxybutynin ER (DITROPAN-XL) 5 MG 24 hr tablet Take 5 mg by mouth 2 times daily       oxyCODONE (ROXICODONE) 5 MG tablet Take 1 tablet (5 mg) by mouth every 4 hours as needed for severe pain (Patient taking differently: Take 5-10 mg by mouth every 4 hours as needed for severe pain ) 60 tablet 0      rivaroxaban ANTICOAGULANT (XARELTO) 10 MG TABS tablet Take 10 mg by mouth daily (with dinner) Take for 28 days total, start 7/3/2021       senna-docusate (SENOKOT-S/PERICOLACE) 8.6-50 MG tablet Take 1 tablet by mouth 2 times daily       SIMVASTATIN PO Take 40 mg by mouth At Bedtime       vitamin D (ERGOCALCIFEROL) 22541 UNIT capsule Take 50,000 Units by mouth once a week Take every Mon         Medication Changes/Rationale:     Bowel regimen adjusted     Controlled medications sent with patient:   Medication: oxycodone , max 30 tabs given to patient at the time of discharge to take home     ROS:   4 point ROS including Respiratory, CV, GI and , other than that noted in the HPI,  is negative    Physical Exam:   Vitals: BP (!) 153/79   Pulse 61   Temp 97.5  F (36.4  C)   Resp 16   Ht 1.524 m (5')   Wt 73.5 kg (162 lb)   SpO2 95%   BMI 31.64 kg/m    BMI= Body mass index is 31.64 kg/m .  GENERAL APPEARANCE:  Alert, in no distress  ENT:  normal hearing acuity, oropharynx clear  EYES:  EOM normal, conjunctiva and lids normal  NECK:  No adenopathy,masses or thyromegaly  RESP:  lungs clear to auscultation , no respiratory distress  CV:  regular rate and rhythm, 2/6 murmur,  no LE edema, +2 pedal pulses  ABDOMEN:  soft, non-tender, no distension, no masses  M/S:  up in chair. Mild edema of LUE and hand, wearing compression garment and immobilizer. Good strength and ROM of both LE and RUE. No joint inflammation   SKIN:  dressing left shoulder clean, dry, intact, no erythema. No rashes or open areas  PSYCH:  oriented X 3, normal insight, judgement and memory, affect and mood normal    SNF labs: Labs done in SNF are in [employer] EPIC. Please refer to them using [facility] Everywhere.      DISCHARGE PLAN:    Follow up labs: per PCP    Medical Follow Up:      Follow up with primary care provider within 4 weeks   Follow up with specialist [facility] 7/8/2021     MT referral needed and placed by this provider: No    Current  [place] scheduled appointments:  Next 5 appointments (look out 90 days)    Jul 13, 2021  1:20 PM  (Arrive by 1:00 PM)  Office Visit with Katia Kenny MD  [facility] ([facility] ) [address] [place] [address]  [phone]           Discharge Services: Home Care:  Occupational Therapy, Physical Therapy, Registered Nurse, Home Health Aide and From:  [facility]    Discharge Instructions Verbalized to Patient at Discharge:     Keep incision clean and dry   Continue shoulder immobilizer and compression bandage as instructed.    Taper and discontinue oxycodone as your pain improves       TOTAL DISCHARGE TIME:   Greater than 30 minutes  Electronically signed by:  KAIDEN Rivas CNP

## 2021-07-01 NOTE — PATIENT INSTRUCTIONS
[facility] Discharge Orders    Name: Claudia Powers  : 1945  Planned Discharge Date: 2021  Discharged to: previous independent home    MEDICAL FOLLOW UP  Follow up with PCP Dr Kenny within 30 days   Follow up with Specialists: [facility] as scheduled 2021  Next 5 appointments (look out 90 days)    2021  1:20 PM  (Arrive by 1:00 PM)  Office Visit with Katia Kenny MD  [facility] ([facility] ) [address] [place] [address]  [phone]            ORDER CHANGES:  Discontinue lovenox 2021 and start Xarelto 7/3/2021 as ordered.  Hold aspirin while taking Xarelto.      DISCHARGE MEDICATIONS:  The patient s pharmacy is authorized to dispense a 30-day supply of medications. Refill requests should be directed to the primary provider, Katia Kenny.   At discharge, the facility may send patient's remaining supply of oxycodone maximum 30 tablets.  Current Outpatient Medications   Medication Sig Dispense Refill     acetaminophen (TYLENOL) 500 MG tablet Take 1,000 mg by mouth 3 times daily       amoxicillin (AMOXIL) 500 MG capsule Take 2,000 mg by mouth once as needed Prior to dental work       aspirin 81 MG EC tablet Take 81 mg by mouth daily HOLD while on Xarelto       bisacodyl (DULCOLAX) 10 MG suppository Place 10 mg rectally daily as needed for constipation       calcium carbonate (OS-MARCEL) 1500 (600 Ca) MG tablet Take 1,200 mg by mouth daily       cyanocobalamin (CYANOCOBALAMIN) 1000 MCG/ML injection Inject 1 mL into the muscle every 14 days       denosumab (PROLIA) 60 MG/ML SOLN injection Inject 60 mg Subcutaneous every 6 months        EPINEPHrine (EPIPEN) 0.3 MG/0.3ML injection Inject 0.3 mg into the muscle once as needed for anaphylaxis       fish oil-omega-3 fatty acids 1000 MG capsule Take 2 g by mouth 2 times daily       fluticasone (FLONASE) 50 MCG/ACT nasal spray Spray 2 sprays into both nostrils daily        LEVOTHYROXINE SODIUM PO Take 75 mcg by mouth daily       METOPROLOL SUCCINATE ER PO Take 100 mg by mouth daily        olopatadine (PATANOL) 0.1 % ophthalmic solution Place 1 drop into both eyes 2 times daily       oxybutynin ER (DITROPAN-XL) 5 MG 24 hr tablet Take 5 mg by mouth 2 times daily       oxyCODONE (ROXICODONE) 5 MG tablet Take 1-2 tablets  by mouth every 4 hours as needed for severe pain        rivaroxaban ANTICOAGULANT (XARELTO) 10 MG TABS tablet Take 10 mg by mouth daily (with dinner) Take for 28 days total, start 7/3/2021       senna-docusate (SENOKOT-S/PERICOLACE) 8.6-50 MG tablet Take 1 tablet by mouth 2 times daily       SIMVASTATIN PO Take 40 mg by mouth At Bedtime       vitamin D (ERGOCALCIFEROL) 51102 UNIT capsule Take 50,000 Units by mouth once a week Take every Mon         SERVICES:  Home Care:  Occupational Therapy, Physical Therapy, Registered Nurse, Home Health Aide and From:  [facility]    ADDITIONAL INSTRUCTIONS:  Continue shoulder immobilizer and compression bandage as instructed.   Taper and discontinue oxycodone as your pain improves       Vishnu Meyer APRN CNP  This document was electronically signed on July 1, 2021

## 2021-07-02 ENCOUNTER — COMMUNICATION - HEALTHEAST (OUTPATIENT)
Dept: HOME HEALTH SERVICES | Facility: HOME HEALTH | Age: 76
End: 2021-07-02

## 2021-07-03 NOTE — ADDENDUM NOTE
Addendum Note by Katia Kenny MD at 5/30/2017 11:31 AM     Author: Katia Kenny MD Service: -- Author Type: Physician    Filed: 5/30/2017 11:31 AM Encounter Date: 5/30/2017 Status: Signed    : Katia Kenny MD (Physician)    Addended by: KATIA KENNY on: 5/30/2017 11:31 AM        Modules accepted: Orders

## 2021-07-03 NOTE — ADDENDUM NOTE
Addendum Note by Katia Kenny MD at 12/4/2017  4:13 PM     Author: Katia Kenny MD Service: -- Author Type: Physician    Filed: 12/4/2017  4:13 PM Encounter Date: 12/4/2017 Status: Signed    : Katia Kenny MD (Physician)    Addended by: KATIA KENNY on: 12/4/2017 04:13 PM        Modules accepted: Orders

## 2021-07-04 ENCOUNTER — COMMUNICATION - HEALTHEAST (OUTPATIENT)
Dept: PHYSICAL THERAPY | Age: 76
End: 2021-07-04

## 2021-07-04 NOTE — ADDENDUM NOTE
Addendum Note by Katia Kenny MD at 4/2/2021  1:39 PM     Author: Katia Kenny MD Service: -- Author Type: Physician    Filed: 4/2/2021  1:39 PM Encounter Date: 3/27/2021 Status: Signed    : Katia Kenny MD (Physician)    Addended by: KATIA KENNY on: 4/2/2021 01:39 PM        Modules accepted: Orders

## 2021-07-04 NOTE — TELEPHONE ENCOUNTER
Telephone Encounter by Perla Pavon MA at 7/2/2021  4:43 PM     Author: Perla Pavon MA Service: -- Author Type: Medical Assistant    Filed: 7/2/2021  4:43 PM Encounter Date: 7/2/2021 Status: Signed    : Perla Pavon MA (Medical Assistant)       Please advise DOD

## 2021-07-04 NOTE — ADDENDUM NOTE
Addendum Note by Katia Kenny MD at 4/1/2021  6:36 PM     Author: Katia Kenny MD Service: -- Author Type: Physician    Filed: 4/1/2021  6:36 PM Encounter Date: 3/27/2021 Status: Signed    : Katia Kenny MD (Physician)    Addended by: KATIA KENNY on: 4/1/2021 06:36 PM        Modules accepted: Orders

## 2021-07-04 NOTE — TELEPHONE ENCOUNTER
Telephone Encounter by Belle Matias PT at 7/2/2021  3:58 PM     Author: Belle Matias PT Service: -- Author Type: Physical Therapist    Filed: 7/2/2021  4:29 PM Encounter Date: 7/2/2021 Status: Signed    : Belle Matias PT (Physical Therapist)       Request for Orders    Whos Requesting: Home Care Physical Therapist    Orders being requested: requesting orders to delay home care PT start of care to 7/4/21.     Where to send Orders: Please reply to this message.     Thank you,  Belle Matias, PT, DPT  Lead Therapist

## 2021-07-04 NOTE — TELEPHONE ENCOUNTER
Telephone Encounter by Yeni Rdz PT at 7/4/2021 12:52 PM     Author: Yeni Rdz PT Service: -- Author Type: Physical Therapist    Filed: 7/4/2021 12:55 PM Encounter Date: 7/4/2021 Status: Signed    : Yeni Rdz PT (Physical Therapist)       PT [facility] is asking for verbal ok fo PT 1w1 to develop program to continue  OT evaluation for lymphedema, and to follow s/p L TSA.  SN evaluation  HHA 1w1,2w3 to help with bathing.  MSW evaluation for long term resources.    You can respond to this inbasket with the ok or call and leave a message at [phone].  Thank you

## 2021-07-05 ENCOUNTER — COMMUNICATION - HEALTHEAST (OUTPATIENT)
Dept: FAMILY MEDICINE | Facility: CLINIC | Age: 76
End: 2021-07-05

## 2021-07-05 NOTE — TELEPHONE ENCOUNTER
Telephone Encounter by Faith Parks at 2021  3:56 PM     Author: Faith Parks Service: -- Author Type: Patient Access    Filed: 2021  3:59 PM Encounter Date: 2021 Status: Signed    : Faith Parks (Patient Access)       Reason for Call:  Home Health Care    [facility] called regarding (reason for call):     Orders are needed for this patient.     PT:     OT:     Skilled Nursin time a week x 1 and 1 rime a week x 3    Pt Provider: dr patel    Phone Number Homecare Nurse can be reached at: [phone]    Can we leave a detailed message on this number? Yes     Phone number patient can be reached at: Other phone number:  [phone]    Best Time: any    Call taken on 2021 at 3:57 PM by Faith Parks

## 2021-07-05 NOTE — TELEPHONE ENCOUNTER
Telephone Encounter by Katia Kenny MD at 7/5/2021  7:28 AM     Author: Katia Kenny MD Service: -- Author Type: Physician    Filed: 7/5/2021  7:28 AM Encounter Date: 7/4/2021 Status: Signed    : Katia Kenny MD (Physician)       I authorize this.

## 2021-07-06 ENCOUNTER — COMMUNICATION - HEALTHEAST (OUTPATIENT)
Dept: FAMILY MEDICINE | Facility: CLINIC | Age: 76
End: 2021-07-06

## 2021-07-06 NOTE — TELEPHONE ENCOUNTER
Telephone Encounter by Vira Cardozo MA at 7/6/2021  4:11 PM     Author: Vira Cardozo MA Service: -- Author Type: Medical Assistant    Filed: 7/6/2021  4:12 PM Encounter Date: 7/5/2021 Status: Signed    : Vira Cardozo MA (Medical Assistant)       Called and gave verbal orders to ava

## 2021-07-06 NOTE — TELEPHONE ENCOUNTER
Telephone Encounter by Katia Kenny MD at 7/6/2021  3:59 PM     Author: Katia Kenny MD Service: -- Author Type: Physician    Filed: 7/6/2021  3:59 PM Encounter Date: 7/5/2021 Status: Signed    : Katia Kenny MD (Physician)       Please see chart. These orders were authorized by  on 7/2/21 and by me on 7/5/21.

## 2021-07-06 NOTE — TELEPHONE ENCOUNTER
Telephone Encounter by Vira Cardozo MA at 7/6/2021  1:55 PM     Author: Vira Cardozo MA Service: -- Author Type: Medical Assistant    Filed: 7/6/2021  1:55 PM Encounter Date: 7/5/2021 Status: Signed    : Vira Cardozo MA (Medical Assistant)       Okay for orders?

## 2021-07-06 NOTE — TELEPHONE ENCOUNTER
Telephone Encounter by Faith Parks at 7/6/2021 10:36 AM     Author: Faith Parks Service: -- Author Type: Patient Access    Filed: 7/6/2021 10:38 AM Encounter Date: 7/6/2021 Status: Signed    : Faith Parks (Patient Access)       Reason for Call:  Home Health Care    [facility] called regarding (reason for call):     Orders are needed for this patient.     PT:     OT: 2 timjkes a week x 1, 3 times a week x 3  2 times x 2 and 1 time x 1 for adl and lymphadema management.    Skilled Nursing:     Pt Provider: dr patel    Phone Number Homecare Nurse can be reached at: [phone]    Can we leave a detailed message on this number? Yes     Phone number patient can be reached at: Other phone number:  [phone]    Best Time: any    Call taken on 7/6/2021 at 10:36 AM by Faith Parks

## 2021-07-06 NOTE — TELEPHONE ENCOUNTER
Telephone Encounter by Faith Parks at 7/6/2021  1:53 PM     Author: Faith Parks Service: -- Author Type: Patient Access    Filed: 7/6/2021  1:54 PM Encounter Date: 7/5/2021 Status: Signed    : Faith Parks (Patient Access)       ava from [facility] called back again hoping to get a call with verbal ok for orders for this patient

## 2021-07-06 NOTE — TELEPHONE ENCOUNTER
Telephone Encounter by Vira Cardozo MA at 7/6/2021  1:54 PM     Author: Vira Cardozo MA Service: -- Author Type: Medical Assistant    Filed: 7/6/2021  1:54 PM Encounter Date: 7/6/2021 Status: Signed    : Vira Cardozo MA (Medical Assistant)       Okay for orders?

## 2021-07-06 NOTE — TELEPHONE ENCOUNTER
Telephone Encounter by Faith Parks at 7/6/2021  8:59 AM     Author: Faith Parks Service: -- Author Type: Patient Access    Filed: 7/6/2021  9:00 AM Encounter Date: 7/5/2021 Status: Signed    : Faith Parks (Patient Access)       rony from [facility] called back again asking for the verbal orders. It looks like a call back was made to the patient...I have attempted to contact this patient by telephone, but there is no answer repeatedly. I will continue to try later. The nurse from home care.... can somneone please call back with the verbal ok???

## 2021-07-06 NOTE — TELEPHONE ENCOUNTER
Telephone Encounter by Vira Cardozo MA at 7/6/2021 10:12 AM     Author: Vira Cardozo MA Service: -- Author Type: Medical Assistant    Filed: 7/6/2021 10:12 AM Encounter Date: 7/4/2021 Status: Signed    : Vira Cardozo MA (Medical Assistant)       See okay for orders below

## 2021-07-06 NOTE — TELEPHONE ENCOUNTER
Telephone Encounter by Adrianna Dennis at 7/6/2021  7:50 AM     Author: Adrianna Dennis Service: -- Author Type: --    Filed: 7/6/2021  7:50 AM Encounter Date: 7/2/2021 Status: Signed    : Adrianna Dennis       Called pt, and relate message below

## 2021-07-07 NOTE — TELEPHONE ENCOUNTER
Telephone Encounter by Joanna Hernandez at 7/7/2021 11:17 AM     Author: Joanna Hernandez Service: -- Author Type: Patient Access    Filed: 7/7/2021 11:19 AM Encounter Date: 7/6/2021 Status: Signed    : Joanna Hernandez (Patient Access)       Maxx calling back to check status

## 2021-07-08 NOTE — TELEPHONE ENCOUNTER
Telephone Encounter by Faith Parks at 7/8/2021 10:21 AM     Author: Faith Parks Service: -- Author Type: Patient Access    Filed: 7/8/2021 10:21 AM Encounter Date: 7/6/2021 Status: Signed    : Faith Parks (Patient Access)       Maxx from [facility] calling back again to get verbal orders

## 2021-07-08 NOTE — TELEPHONE ENCOUNTER
Telephone Encounter by Vira Cardozo MA at 7/8/2021 11:17 AM     Author: Vira Cardozo MA Service: -- Author Type: Medical Assistant    Filed: 7/8/2021 11:17 AM Encounter Date: 7/6/2021 Status: Signed    : Vira Cardozo MA (Medical Assistant)       Called and left verbal orders on paras carlos

## 2021-07-13 ENCOUNTER — OFFICE VISIT (OUTPATIENT)
Dept: FAMILY MEDICINE | Facility: CLINIC | Age: 76
End: 2021-07-13
Payer: MEDICARE

## 2021-07-13 VITALS
TEMPERATURE: 96.7 F | BODY MASS INDEX: 34.37 KG/M2 | HEIGHT: 59 IN | RESPIRATION RATE: 16 BRPM | WEIGHT: 170.5 LBS | SYSTOLIC BLOOD PRESSURE: 131 MMHG | DIASTOLIC BLOOD PRESSURE: 84 MMHG | HEART RATE: 68 BPM | OXYGEN SATURATION: 96 %

## 2021-07-13 DIAGNOSIS — D64.9 ANEMIA, UNSPECIFIED TYPE: ICD-10-CM

## 2021-07-13 DIAGNOSIS — Z09 FOLLOW-UP EXAMINATION FOLLOWING SURGERY: Primary | ICD-10-CM

## 2021-07-13 DIAGNOSIS — E88.09 HYPOALBUMINEMIA: ICD-10-CM

## 2021-07-13 PROCEDURE — 99213 OFFICE O/P EST LOW 20 MIN: CPT | Performed by: FAMILY MEDICINE

## 2021-07-13 RX ORDER — SOLIFENACIN SUCCINATE 5 MG/1
5 TABLET, FILM COATED ORAL
COMMUNITY
End: 2022-05-02

## 2021-07-13 RX ORDER — TROSPIUM CHLORIDE ER 60 MG/1
CAPSULE ORAL
COMMUNITY
Start: 2021-04-27 | End: 2022-07-05

## 2021-07-13 RX ORDER — SENNOSIDES 8.6 MG
650 CAPSULE ORAL EVERY 8 HOURS PRN
COMMUNITY

## 2021-07-13 RX ORDER — LYSINE HCL 500 MG
2 TABLET ORAL
COMMUNITY

## 2021-07-13 RX ORDER — METOPROLOL SUCCINATE 100 MG/1
TABLET, EXTENDED RELEASE ORAL
COMMUNITY
Start: 2021-02-22 | End: 2022-03-09

## 2021-07-13 ASSESSMENT — MIFFLIN-ST. JEOR: SCORE: 1179.01

## 2021-07-13 ASSESSMENT — PATIENT HEALTH QUESTIONNAIRE - PHQ9: SUM OF ALL RESPONSES TO PHQ QUESTIONS 1-9: 0

## 2021-07-13 NOTE — PROGRESS NOTES
"Office Visit  [facility]  Date of Service: Jul 13, 2021      Subjective   Claudia Powers is a 76 year old female who presents for   Chief Complaint   Patient presents with     Hospital F/U     surgery heber-15 left shoulder      Here with carlos Roger    Surgery was 6/15/21: Left total reverse shoulder arthroplsy with Dr. Escoto at [place].    Had surgery at [facility], was discharged to [facility], now receiving  homecare. They get orders from here. Somebody in home 5 days a week.    Painful the first few days. Still wearing sling, taking tylenol 650 three times daily, sometimes with an extra at night. Taking it easy.   Doing therapy.  Lymphedema not getting better. Will stop wraps 7/29, move to  sleeve. Start therapy at [place] Three times a week.    Leaving for [place] in mid October.     Objective   /84 (BP Location: Right arm, Patient Position: Sitting, Cuff Size: Adult Large)   Pulse 68   Temp 96.7  F (35.9  C) (Temporal)   Resp 16   Ht 1.499 m (4' 11\")   Wt 77.3 kg (170 lb 8 oz)   SpO2 96%   BMI 34.44 kg/m     She reports that she has never smoked. She has never used smokeless tobacco.    Gen: Alert, no apparent distress. Pale.  Extremities: Left arm in sling with lymphedema garment in place. 2+ lower extremity edema.   No results found for any visits on 07/13/21.      Assessment & Plan     1. Left shoulder arthroplasty. Recovering well - has supports, medical follow up, and therapies in place.  2. Anemia - due to acute blood loss. Discussed iron/B-vitamin rich diet.  3. Low albumin - discussed dietary protein sources and importance for healing and edema.     This week, the [employer] and [employer] instances of Epic merged. Not all information is pulling through to the [employer] instance of Epic at this time, this may affect documentation.    Order Summary                                                      Follow-up examination following " surgery    Anemia, unspecified type    Hypoalbuminemia    Other orders  -     metoprolol succinate ER (TOPROL-XL) 100 MG 24 hr tablet; TAKE 1 TABLET BY MOUTH EVERY DAY  -     trospium (SANCTURA XR) 60 MG CP24 24 hr capsule; TAKE 1 CAPSULE (60 MG TOTAL) BY MOUTH DAILY BEFORE BREAKFAST.  -     solifenacin (VESICARE) 5 MG tablet; Take 5 mg by mouth  -     Calcium Carbonate-Vit D-Min (CALCIUM 600+D PLUS MINERALS) 600-400 MG-UNIT TABS; Take 2 tablets by mouth  -     acetaminophen (TYLENOL) 650 MG CR tablet; Take 650 mg by mouth every 8 hours as needed for pain            No future appointments.    Completed by: Katia Kenny M.D., [facility]. 7/13/2021 1:26 PM.  This transcription uses voice recognition software, which may contain typographical errors.

## 2021-07-14 DIAGNOSIS — Z53.9 DIAGNOSIS NOT YET DEFINED: Primary | ICD-10-CM

## 2021-07-30 ENCOUNTER — MEDICAL CORRESPONDENCE (OUTPATIENT)
Dept: HEALTH INFORMATION MANAGEMENT | Facility: CLINIC | Age: 76
End: 2021-07-30

## 2021-09-07 NOTE — ADDENDUM NOTE
Addendum Note by Aruna Burks CMA at 12/4/2017  4:10 PM     Author: Aruna Burks CMA Service: -- Author Type: Certified Medical Assistant    Filed: 12/4/2017  4:10 PM Encounter Date: 12/4/2017 Status: Signed    : Aruna Burks CMA (Certified Medical Assistant)    Addended by: ARUNA BURKS on: 12/4/2017 04:10 PM        Modules accepted: Orders         Yes

## 2021-09-09 ENCOUNTER — HOSPITAL ENCOUNTER (OUTPATIENT)
Dept: MAMMOGRAPHY | Facility: CLINIC | Age: 76
Discharge: HOME | End: 2021-09-09
Attending: FAMILY MEDICINE | Admitting: FAMILY MEDICINE
Payer: MEDICARE

## 2021-09-09 DIAGNOSIS — Z12.31 VISIT FOR SCREENING MAMMOGRAM: ICD-10-CM

## 2021-09-09 PROCEDURE — 77067 SCR MAMMO BI INCL CAD: CPT | Mod: 52

## 2021-09-24 ENCOUNTER — OFFICE VISIT (OUTPATIENT)
Dept: FAMILY MEDICINE | Facility: CLINIC | Age: 76
End: 2021-09-24
Payer: MEDICARE

## 2021-09-24 ENCOUNTER — TELEPHONE (OUTPATIENT)
Dept: FAMILY MEDICINE | Facility: CLINIC | Age: 76
End: 2021-09-24

## 2021-09-24 VITALS
WEIGHT: 171 LBS | BODY MASS INDEX: 34.54 KG/M2 | RESPIRATION RATE: 18 BRPM | DIASTOLIC BLOOD PRESSURE: 95 MMHG | HEART RATE: 65 BPM | TEMPERATURE: 97.1 F | SYSTOLIC BLOOD PRESSURE: 172 MMHG

## 2021-09-24 DIAGNOSIS — E61.1 IRON DEFICIENCY: ICD-10-CM

## 2021-09-24 DIAGNOSIS — E03.9 HYPOTHYROIDISM, UNSPECIFIED TYPE: ICD-10-CM

## 2021-09-24 DIAGNOSIS — Z11.59 NEED FOR HEPATITIS C SCREENING TEST: ICD-10-CM

## 2021-09-24 DIAGNOSIS — Z13.220 SCREENING FOR HYPERLIPIDEMIA: ICD-10-CM

## 2021-09-24 DIAGNOSIS — R01.1 HEART MURMUR: ICD-10-CM

## 2021-09-24 DIAGNOSIS — R06.09 DYSPNEA ON EXERTION: ICD-10-CM

## 2021-09-24 DIAGNOSIS — Z86.79 HISTORY OF RHEUMATIC FEVER: ICD-10-CM

## 2021-09-24 DIAGNOSIS — I10 ESSENTIAL HYPERTENSION, BENIGN: ICD-10-CM

## 2021-09-24 DIAGNOSIS — M25.471 SWELLING OF BOTH ANKLES: Primary | ICD-10-CM

## 2021-09-24 DIAGNOSIS — M25.472 SWELLING OF BOTH ANKLES: Primary | ICD-10-CM

## 2021-09-24 DIAGNOSIS — D64.9 ANEMIA, UNSPECIFIED TYPE: ICD-10-CM

## 2021-09-24 LAB
ALBUMIN SERPL-MCNC: 3.6 G/DL (ref 3.5–5)
ALBUMIN UR-MCNC: NEGATIVE MG/DL
ALP SERPL-CCNC: 73 U/L (ref 45–120)
ALT SERPL W P-5'-P-CCNC: 21 U/L (ref 0–45)
ANION GAP SERPL CALCULATED.3IONS-SCNC: 12 MMOL/L (ref 5–18)
APPEARANCE UR: CLEAR
AST SERPL W P-5'-P-CCNC: 26 U/L (ref 0–40)
BACTERIA #/AREA URNS HPF: ABNORMAL /HPF
BILIRUB SERPL-MCNC: 0.3 MG/DL (ref 0–1)
BILIRUB UR QL STRIP: NEGATIVE
BUN SERPL-MCNC: 16 MG/DL (ref 8–28)
CALCIUM SERPL-MCNC: 8.9 MG/DL (ref 8.5–10.5)
CHLORIDE BLD-SCNC: 108 MMOL/L (ref 98–107)
CHOLEST SERPL-MCNC: 152 MG/DL
CO2 SERPL-SCNC: 22 MMOL/L (ref 22–31)
COLOR UR AUTO: YELLOW
CREAT SERPL-MCNC: 0.61 MG/DL (ref 0.6–1.1)
ERYTHROCYTE [DISTWIDTH] IN BLOOD BY AUTOMATED COUNT: 13.4 % (ref 10–15)
FASTING STATUS PATIENT QL REPORTED: NORMAL
GFR SERPL CREATININE-BSD FRML MDRD: 90 ML/MIN/1.73M2
GLUCOSE BLD-MCNC: 92 MG/DL (ref 70–125)
GLUCOSE UR STRIP-MCNC: NEGATIVE MG/DL
HCT VFR BLD AUTO: 39.5 % (ref 35–47)
HCV AB SERPL QL IA: NONREACTIVE
HDLC SERPL-MCNC: 72 MG/DL
HGB BLD-MCNC: 12.2 G/DL (ref 11.7–15.7)
HGB UR QL STRIP: ABNORMAL
IRON SATN MFR SERPL: 11 % (ref 15–46)
IRON SERPL-MCNC: 42 UG/DL (ref 35–180)
KETONES UR STRIP-MCNC: NEGATIVE MG/DL
LDLC SERPL CALC-MCNC: 72 MG/DL
LEUKOCYTE ESTERASE UR QL STRIP: NEGATIVE
MCH RBC QN AUTO: 28.1 PG (ref 26.5–33)
MCHC RBC AUTO-ENTMCNC: 30.9 G/DL (ref 31.5–36.5)
MCV RBC AUTO: 91 FL (ref 78–100)
NITRATE UR QL: NEGATIVE
NT-PROBNP SERPL-MCNC: 299 PG/ML (ref 0–900)
PH UR STRIP: 5.5 [PH] (ref 5–8)
PLATELET # BLD AUTO: 278 10E3/UL (ref 150–450)
POTASSIUM BLD-SCNC: 4.1 MMOL/L (ref 3.5–5)
PROT SERPL-MCNC: 7 G/DL (ref 6–8)
RBC # BLD AUTO: 4.34 10E6/UL (ref 3.8–5.2)
RBC #/AREA URNS AUTO: ABNORMAL /HPF
SODIUM SERPL-SCNC: 142 MMOL/L (ref 136–145)
SP GR UR STRIP: 1.02 (ref 1–1.03)
SQUAMOUS #/AREA URNS AUTO: ABNORMAL /LPF
TIBC SERPL-MCNC: 366 UG/DL (ref 240–430)
TRIGL SERPL-MCNC: 38 MG/DL
TSH SERPL DL<=0.005 MIU/L-ACNC: 1.32 UIU/ML (ref 0.3–5)
UROBILINOGEN UR STRIP-ACNC: 0.2 E.U./DL
WBC # BLD AUTO: 4.5 10E3/UL (ref 4–11)
WBC #/AREA URNS AUTO: ABNORMAL /HPF

## 2021-09-24 PROCEDURE — 86803 HEPATITIS C AB TEST: CPT | Performed by: FAMILY MEDICINE

## 2021-09-24 PROCEDURE — 80053 COMPREHEN METABOLIC PANEL: CPT | Performed by: FAMILY MEDICINE

## 2021-09-24 PROCEDURE — 84443 ASSAY THYROID STIM HORMONE: CPT | Performed by: FAMILY MEDICINE

## 2021-09-24 PROCEDURE — 83880 ASSAY OF NATRIURETIC PEPTIDE: CPT | Performed by: FAMILY MEDICINE

## 2021-09-24 PROCEDURE — 83550 IRON BINDING TEST: CPT | Performed by: FAMILY MEDICINE

## 2021-09-24 PROCEDURE — 80061 LIPID PANEL: CPT | Performed by: FAMILY MEDICINE

## 2021-09-24 PROCEDURE — 36415 COLL VENOUS BLD VENIPUNCTURE: CPT | Performed by: FAMILY MEDICINE

## 2021-09-24 PROCEDURE — 85027 COMPLETE CBC AUTOMATED: CPT | Performed by: FAMILY MEDICINE

## 2021-09-24 PROCEDURE — 99214 OFFICE O/P EST MOD 30 MIN: CPT | Performed by: FAMILY MEDICINE

## 2021-09-24 PROCEDURE — 81001 URINALYSIS AUTO W/SCOPE: CPT | Performed by: FAMILY MEDICINE

## 2021-09-24 RX ORDER — FUROSEMIDE 20 MG
20 TABLET ORAL DAILY PRN
Qty: 30 TABLET | Refills: 3 | Status: SHIPPED | OUTPATIENT
Start: 2021-09-24 | End: 2021-12-20

## 2021-09-24 RX ORDER — TRIAMCINOLONE ACETONIDE 1 MG/G
OINTMENT TOPICAL
COMMUNITY
Start: 2020-12-07

## 2021-09-24 RX ORDER — SIMVASTATIN 40 MG
TABLET ORAL
COMMUNITY
Start: 2021-07-17 | End: 2021-12-20

## 2021-09-24 RX ORDER — OLOPATADINE HYDROCHLORIDE 2 MG/ML
SOLUTION/ DROPS OPHTHALMIC
COMMUNITY
Start: 2020-12-24

## 2021-09-24 RX ORDER — OXYBUTYNIN CHLORIDE 5 MG/1
TABLET ORAL
COMMUNITY
Start: 2021-04-01 | End: 2022-05-02

## 2021-09-24 RX ORDER — LEVOTHYROXINE SODIUM 75 UG/1
TABLET ORAL
COMMUNITY
Start: 2021-07-26 | End: 2022-03-09

## 2021-09-24 RX ORDER — LISINOPRIL 20 MG/1
20 TABLET ORAL DAILY
Qty: 30 TABLET | Refills: 1 | Status: SHIPPED | OUTPATIENT
Start: 2021-09-24 | End: 2022-05-02 | Stop reason: DRUGHIGH

## 2021-09-24 NOTE — TELEPHONE ENCOUNTER
Reason for Call:  Medication or medication refill:    Do you use a [facility]?  Name of the pharmacy and phone number for the current request:      Name of the medication requested: pt was seen this am and called her pharmacy and they havent received her prescriptions.. I told her it takes a bit to send scripts... told her to check later on today.    Other request:     Can we leave a detailed message on this number? YES    Phone number patient can be reached at: Home number on file [phone] (home)    Best Time: any    Call taken on 9/24/2021 at 11:19 AM by Faith Parks

## 2021-09-24 NOTE — PROGRESS NOTES
Office Visit  [facility]  Date of Service: Sep 24, 2021      Subjective   Claudia Powers is a 76 year old female who presents for   Chief Complaint   Patient presents with     Leg Swelling     not hurting now but hurts through out the day and shoes don't fit, already have flu shot ths month at [facility]     Claudia has been swelling in both feet since her recent surgery.  Shoes don't fit well.  Used to have some swelling the evening, but now it's in the morning, too. And worse.  She has a history of lymphedema in her left arm after shoulder surgery  She has noticed a little more swelling in left arm where the lymphedema is, but it's getting better.  She has some dyspnea with exertion, no orthopnea. Otherwise feels well.     Blood pressure elevated since surgery. Not sure why. Still taking metoprolol  mg daily.     Has had intermittent episodes of left hand numbness, It felt like it was asleep during the day    Going to [place] in October.   [place] Physician  Dr. Vianey Carcamo [phone]  [facility]  [address]   [place] [address]    Objective   BP (!) 184/100 (BP Location: Right arm, Patient Position: Sitting, Cuff Size: Adult Regular)   Pulse 67   Temp 97.1  F (36.2  C) (Temporal)   Resp 18   Wt 77.6 kg (171 lb)   BMI 34.54 kg/m     She reports that she has never smoked. She has never used smokeless tobacco.  BP Readings from Last 6 Encounters:   09/24/21 (!) 184/100   07/13/21 131/84   06/30/21 (!) 153/79   06/23/21 136/74   06/21/21 133/84   09/10/20 (!) 188/90     Gen: Alert, no apparent distress.  Heart: Regular rate and rhythm, II/VI Systolic murmur  Lungs: Clear to auscultation bilaterally, no increased work of breathing.  Abdomen: Soft, non-tender, non-distended, bowel sounds normal.  Extremities: No clubbing, cyanosis. 1+ pitting edema to right knee and left mid shin.  No results found for any visits on 09/24/21.      Assessment &  Plan     1. Bilateral lower extremity edema    Check for cause with CMP, BNP, UA and echocardiogram    Furosemide 20 mg QAM prn swelling  2. Elevated blood pressure    Check check labs    Start lisinopril 20 mg daily    Recheck BP in two weeks      Order Summary                                                      Swelling of both ankles  -     Comprehensive metabolic panel (BMP + Alb, Alk Phos, ALT, AST, Total. Bili, TP); Future  -     Nt probnp inpatient; Future  -     Iron and iron binding capacity; Future  -     CBC with platelets; Future  -     Echocardiogram Complete; Future  -     furosemide (LASIX) 20 MG tablet; Take 1 tablet (20 mg) by mouth daily as needed (lower extremity edema)  -     UA reflex to Microscopic - lab collect; Future    Need for hepatitis C screening test  -     Hepatitis C Screen Reflex to HCV RNA Quant and Genotype; Future    Screening for hyperlipidemia  -     Lipid panel reflex to direct LDL Fasting; Future    Dyspnea on exertion   -     Nt probnp inpatient; Future  -     Echocardiogram Complete; Future    Anemia, unspecified type   -     Iron and iron binding capacity; Future    History of rheumatic fever  -     Echocardiogram Complete; Future    Heart murmur  -     Echocardiogram Complete; Future    Hypothyroidism, unspecified type  -     TSH; Future    Essential hypertension, benign  -     lisinopril (ZESTRIL) 20 MG tablet; Take 1 tablet (20 mg) by mouth daily    Other orders  -     simvastatin (ZOCOR) 40 MG tablet; TAKE 1 TABLET BY MOUTH EVERYDAY AT BEDTIME  -     levothyroxine (SYNTHROID/LEVOTHROID) 75 MCG tablet; TAKE 1 TABLET BY MOUTH DAILY AT 6 00 AM.  -     olopatadine (PATADAY) 0.2 % ophthalmic solution; INSTILL 1 DROP INTO BOTH EYES TWICE A DAY  -     triamcinolone (KENALOG) 0.1 % external ointment; APPLY TO THE AFFECTED AREAS 3 4 TIMES A DAY FOR 1 2 WEEKS, THEN APPLY AS NEEDED.  -     oxybutynin (DITROPAN) 5 MG tablet; TAKE 1 TABLET BY MOUTH TWICE A DAY  -     REVIEW OF  HEALTH MAINTENANCE PROTOCOL ORDERS  -     INFLUENZA, QUAD, HIGH DOSE, PF, 65YR + (FLUZONE HD)            No future appointments.    Completed by: Katia Kenny M.D., [facility]. 9/24/2021 8:01 AM.  This transcription uses voice recognition software, which may contain typographical errors.

## 2021-09-27 RX ORDER — FERROUS SULFATE 325(65) MG
325 TABLET ORAL EVERY OTHER DAY
Qty: 100 TABLET | Refills: 0 | Status: SHIPPED | OUTPATIENT
Start: 2021-09-27 | End: 2022-04-12

## 2021-10-06 ENCOUNTER — HOSPITAL ENCOUNTER (OUTPATIENT)
Dept: CARDIOLOGY | Facility: CLINIC | Age: 76
Discharge: HOME | End: 2021-10-06
Attending: FAMILY MEDICINE | Admitting: FAMILY MEDICINE
Payer: MEDICARE

## 2021-10-06 DIAGNOSIS — M25.471 SWELLING OF BOTH ANKLES: ICD-10-CM

## 2021-10-06 DIAGNOSIS — M25.472 SWELLING OF BOTH ANKLES: ICD-10-CM

## 2021-10-06 DIAGNOSIS — Z86.79 HISTORY OF RHEUMATIC FEVER: ICD-10-CM

## 2021-10-06 DIAGNOSIS — R01.1 HEART MURMUR: ICD-10-CM

## 2021-10-06 DIAGNOSIS — R06.09 DYSPNEA ON EXERTION: ICD-10-CM

## 2021-10-06 LAB — LVEF ECHO: NORMAL

## 2021-10-06 PROCEDURE — 93306 TTE W/DOPPLER COMPLETE: CPT | Mod: 26 | Performed by: INTERNAL MEDICINE

## 2021-10-06 PROCEDURE — 93306 TTE W/DOPPLER COMPLETE: CPT

## 2021-10-08 ENCOUNTER — ALLIED HEALTH/NURSE VISIT (OUTPATIENT)
Dept: FAMILY MEDICINE | Facility: CLINIC | Age: 76
End: 2021-10-08
Payer: MEDICARE

## 2021-10-08 ENCOUNTER — MYC MEDICAL ADVICE (OUTPATIENT)
Dept: FAMILY MEDICINE | Facility: CLINIC | Age: 76
End: 2021-10-08

## 2021-10-08 VITALS — HEART RATE: 60 BPM | SYSTOLIC BLOOD PRESSURE: 151 MMHG | DIASTOLIC BLOOD PRESSURE: 85 MMHG

## 2021-10-08 DIAGNOSIS — I10 ESSENTIAL HYPERTENSION, BENIGN: Primary | ICD-10-CM

## 2021-10-08 PROCEDURE — 99207 PR NO CHARGE NURSE ONLY: CPT

## 2021-10-08 RX ORDER — LISINOPRIL 40 MG/1
40 TABLET ORAL DAILY
Qty: 90 TABLET | Refills: 3 | Status: SHIPPED | OUTPATIENT
Start: 2021-10-08 | End: 2022-05-02

## 2021-10-08 NOTE — TELEPHONE ENCOUNTER
Spoke with PT @ [phone] offered to help PT schedule appt with the nurse for BP check in 2 weeks, pt declined to schedule appt, pt stated pt will be in [place] for the next 6 month. Pt also stated pt will get her bp check with her provider down in [place]. Pt also stated pt already gave the provider information to one of the CA the last time PT came to the clinic. Please contact pt with questions.

## 2021-10-08 NOTE — TELEPHONE ENCOUNTER
Routing to PCP  Dr Kenny,  Please see below Page[employer] message and advise.      Thanks,  Jocelyn VALDEZ RN

## 2021-10-08 NOTE — PROGRESS NOTES
Patient's BP today was: 159/82, after sitting for 10 mins: 151/85  Patient's Pulse today was: 63    The pt denies feeling lightheaded or dizzy. Sent to PCP

## 2021-10-16 DIAGNOSIS — M25.471 SWELLING OF BOTH ANKLES: ICD-10-CM

## 2021-10-16 DIAGNOSIS — I10 ESSENTIAL HYPERTENSION, BENIGN: ICD-10-CM

## 2021-10-16 DIAGNOSIS — M25.472 SWELLING OF BOTH ANKLES: ICD-10-CM

## 2021-10-17 RX ORDER — LISINOPRIL 20 MG/1
TABLET ORAL
Qty: 30 TABLET | Refills: 1 | OUTPATIENT
Start: 2021-10-17

## 2021-10-17 RX ORDER — FUROSEMIDE 20 MG
TABLET ORAL
Qty: 30 TABLET | Refills: 3 | OUTPATIENT
Start: 2021-10-17

## 2021-10-17 NOTE — TELEPHONE ENCOUNTER
"""Dx'd in 2006.  Seeing a rheumatologist."" Refilled 9/24/21 with 3 refills.  Refilled 9/24/21 with one refill.

## 2021-12-14 DIAGNOSIS — E53.8 VITAMIN B12 DEFICIENCY: ICD-10-CM

## 2021-12-14 DIAGNOSIS — E55.9 VITAMIN D DEFICIENCY: Primary | ICD-10-CM

## 2021-12-16 NOTE — TELEPHONE ENCOUNTER
"Routing refill request to provider for review/approval because:  Failed protocol    Last Written Prescription Date:  12/7/2020  Last Fill Quantity: 6 mL,  # refills: 8   Last office visit provider:  9/24/2021 Dr. Kenny     cyanocobalamin 1,000 mcg/mL injection 6 mL 8 12/7/2020  No   Sig - Route: Inject 1 mL (1,000 mcg total) into the shoulder, thigh, or buttocks every 14 (fourteen) days. - Intramuscular   Sent to pharmacy as: cyanocobalamin (vit B-12) 1,000 mcg/mL injection solution   E-Prescribing Status: Receipt confirmed by pharmacy (12/7/2020  3:14 PM CST         Requested Prescriptions   Pending Prescriptions Disp Refills     cyanocobalamin (CYANOCOBALAMIN) 1000 MCG/ML injection [Pharmacy Med Name: CYANOCOBALAMIN 1,000 MCG/ML VL] 6 mL 5     Sig: INJECT 1 ML INTO THE THIGH, SHOULDER, OR BUTTOCKS EVERY 14 DAYS       Vitamin Supplements (Adult) Protocol Failed - 12/14/2021 12:09 AM        Failed - High dose Vitamin D not ordered        Passed - Recent (12 mo) or future (30 days) visit within the authorizing provider's specialty     Patient has had an office visit with the authorizing provider or a provider within the authorizing providers department within the previous 12 mos or has a future within next 30 days. See \"Patient Info\" tab in inbasket, or \"Choose Columns\" in Meds & Orders section of the refill encounter.              Passed - Medication is active on med list           vitamin D2 (ERGOCALCIFEROL) 71361 units (1250 mcg) capsule [Pharmacy Med Name: VITAMIN D2 1.25MG(50,000 UNIT)] 24 capsule 2     Sig: TAKE 1 CAPSULE BY MOUTH TWICE A WEEK       There is no refill protocol information for this order          Isabel Mcmillan RN 12/15/21 8:20 PM  "

## 2021-12-17 ENCOUNTER — TELEPHONE (OUTPATIENT)
Dept: FAMILY MEDICINE | Facility: CLINIC | Age: 76
End: 2021-12-17
Payer: MEDICARE

## 2021-12-17 DIAGNOSIS — M25.471 SWELLING OF BOTH ANKLES: ICD-10-CM

## 2021-12-17 DIAGNOSIS — E78.5 HYPERLIPIDEMIA, UNSPECIFIED: ICD-10-CM

## 2021-12-17 DIAGNOSIS — M25.472 SWELLING OF BOTH ANKLES: ICD-10-CM

## 2021-12-17 RX ORDER — ERGOCALCIFEROL 1.25 MG/1
CAPSULE, LIQUID FILLED ORAL
Qty: 24 CAPSULE | Refills: 2 | Status: SHIPPED | OUTPATIENT
Start: 2021-12-17 | End: 2023-08-01

## 2021-12-17 RX ORDER — CYANOCOBALAMIN 1000 UG/ML
INJECTION, SOLUTION INTRAMUSCULAR; SUBCUTANEOUS
Qty: 6 ML | Refills: 5 | Status: SHIPPED | OUTPATIENT
Start: 2021-12-17 | End: 2023-02-20

## 2021-12-20 RX ORDER — SIMVASTATIN 40 MG
TABLET ORAL
Qty: 90 TABLET | Refills: 3 | Status: SHIPPED | OUTPATIENT
Start: 2021-12-20 | End: 2022-12-23

## 2021-12-20 RX ORDER — FUROSEMIDE 20 MG
TABLET ORAL
Qty: 90 TABLET | Refills: 0 | Status: SHIPPED | OUTPATIENT
Start: 2021-12-20 | End: 2022-06-09

## 2021-12-20 NOTE — TELEPHONE ENCOUNTER
"Outpatient Medication Detail     Disp Refills Start End KOBY   simvastatin (ZOCOR) 40 MG tablet 90 tablet 3 12/7/2020  No   Sig: TAKE 1 TABLET BY MOUTH EVERYDAY AT BEDTIME   Sent to pharmacy as: simvastatin 40 mg tablet (ZOCOR)   E-Prescribing Status: Receipt confirmed by pharmacy (12/7/2020  3:14 PM CST)       Last office visit provider:  9/24/21     Requested Prescriptions   Pending Prescriptions Disp Refills     simvastatin (ZOCOR) 40 MG tablet [Pharmacy Med Name: SIMVASTATIN 40 MG TABLET] 90 tablet 3     Sig: TAKE 1 TABLET BY MOUTH EVERYDAY AT BEDTIME       Statins Protocol Passed - 12/17/2021  4:45 PM        Passed - LDL on file in past 12 months     Recent Labs   Lab Test 09/24/21  0842   LDL 72             Passed - No abnormal creatine kinase in past 12 months     No lab results found.             Passed - Recent (12 mo) or future (30 days) visit within the authorizing provider's specialty     Patient has had an office visit with the authorizing provider or a provider within the authorizing providers department within the previous 12 mos or has a future within next 30 days. See \"Patient Info\" tab in inbasket, or \"Choose Columns\" in Meds & Orders section of the refill encounter.              Passed - Medication is active on med list        Passed - Patient is age 18 or older        Passed - No active pregnancy on record        Passed - No positive pregnancy test in past 12 months           furosemide (LASIX) 20 MG tablet [Pharmacy Med Name: FUROSEMIDE 20 MG TABLET] 90 tablet      Sig: TAKE 1 TABLET BY MOUTH EVERY DAY AS NEEDED       Diuretics (Including Combos) Protocol Failed - 12/17/2021  4:45 PM        Failed - Blood pressure under 140/90 in past 12 months     BP Readings from Last 3 Encounters:   10/08/21 (!) 151/85   09/24/21 (!) 172/95   07/13/21 131/84                 Passed - Recent (12 mo) or future (30 days) visit within the authorizing provider's specialty     Patient has had an office visit with the " "authorizing provider or a provider within the authorizing providers department within the previous 12 mos or has a future within next 30 days. See \"Patient Info\" tab in inbasket, or \"Choose Columns\" in Meds & Orders section of the refill encounter.              Passed - Medication is active on med list        Passed - Patient is age 18 or older        Passed - No active pregancy on record        Passed - Normal serum creatinine on file in past 12 months     Recent Labs   Lab Test 09/24/21  0842   CR 0.61              Passed - Normal serum potassium on file in past 12 months     Recent Labs   Lab Test 09/24/21  0842   POTASSIUM 4.1                    Passed - Normal serum sodium on file in past 12 months     Recent Labs   Lab Test 09/24/21  0842                 Passed - No positive pregnancy test in past 12 months             Juancarlos Lowery RN 12/20/21 3:03 PM  "

## 2021-12-20 NOTE — TELEPHONE ENCOUNTER
Simvastatin needs to be sent to [address] in [facility] per patient. She called today to ,kyle sure it is going to the correct pharmacyRefill 9/24/21.  Pharmacy change.  Refills adjusted.

## 2021-12-21 RX ORDER — SIMVASTATIN 40 MG
TABLET ORAL
Qty: 90 TABLET | Refills: 1 | OUTPATIENT
Start: 2021-12-21

## 2021-12-22 NOTE — TELEPHONE ENCOUNTER
"Routing refill request to provider for review/approval because:  Duplicate.    Last Written Prescription Date:  12/20/2021  Last Fill Quantity: 90,  # refills: 3   Last office visit provider:  9/24/2021     Requested Prescriptions   Pending Prescriptions Disp Refills     simvastatin (ZOCOR) 40 MG tablet [Pharmacy Med Name: SIMVASTATIN 40 MG TABLET] 90 tablet 1     Sig: TAKE 1 TABLET BY MOUTH EVERYDAY AT BEDTIME       Statins Protocol Passed - 12/20/2021 12:09 AM        Passed - LDL on file in past 12 months     Recent Labs   Lab Test 09/24/21  0842   LDL 72             Passed - No abnormal creatine kinase in past 12 months     No lab results found.             Passed - Recent (12 mo) or future (30 days) visit within the authorizing provider's specialty     Patient has had an office visit with the authorizing provider or a provider within the authorizing providers department within the previous 12 mos or has a future within next 30 days. See \"Patient Info\" tab in inbasket, or \"Choose Columns\" in Meds & Orders section of the refill encounter.              Passed - Medication is active on med list        Passed - Patient is age 18 or older        Passed - No active pregnancy on record        Passed - No positive pregnancy test in past 12 months             Brandi Pickett RN 12/21/21 9:57 PM  "

## 2022-02-13 ENCOUNTER — HEALTH MAINTENANCE LETTER (OUTPATIENT)
Age: 77
End: 2022-02-13

## 2022-03-08 DIAGNOSIS — I10 ESSENTIAL HYPERTENSION, BENIGN: ICD-10-CM

## 2022-03-08 DIAGNOSIS — Z76.0 ENCOUNTER FOR MEDICATION REFILL: Primary | ICD-10-CM

## 2022-03-08 DIAGNOSIS — E03.9 HYPOTHYROIDISM, UNSPECIFIED TYPE: ICD-10-CM

## 2022-03-09 RX ORDER — METOPROLOL SUCCINATE 100 MG/1
TABLET, EXTENDED RELEASE ORAL
Qty: 90 TABLET | Refills: 3 | Status: SHIPPED | OUTPATIENT
Start: 2022-03-09 | End: 2022-03-14

## 2022-03-09 RX ORDER — LEVOTHYROXINE SODIUM 75 UG/1
TABLET ORAL
Qty: 90 TABLET | Refills: 3 | Status: SHIPPED | OUTPATIENT
Start: 2022-03-09 | End: 2022-03-14

## 2022-03-09 NOTE — TELEPHONE ENCOUNTER
Patient is calling back from [place] to check the status of her refills for levothyroxine and metropolol. [facility]

## 2022-03-11 ENCOUNTER — NURSE TRIAGE (OUTPATIENT)
Dept: NURSING | Facility: CLINIC | Age: 77
End: 2022-03-11
Payer: MEDICARE

## 2022-03-11 DIAGNOSIS — E03.9 HYPOTHYROIDISM, UNSPECIFIED TYPE: ICD-10-CM

## 2022-03-11 DIAGNOSIS — Z76.0 ENCOUNTER FOR MEDICATION REFILL: ICD-10-CM

## 2022-03-11 DIAGNOSIS — I10 ESSENTIAL HYPERTENSION, BENIGN: ICD-10-CM

## 2022-03-11 NOTE — TELEPHONE ENCOUNTER
Patient is calling and inquiring about a refill request for synthroid and metoprolol.   Medication were approved, confirmed receipt by pharmacy in [place] on 03/09/22.  Triage guidelines recommend to home care.  Caller verbalized and understands directives.  COVID 19 Nurse Triage Plan/Patient Instructions    Please be aware that novel coronavirus (COVID-19) may be circulating in the community. If you develop symptoms such as fever, cough, or SOB or if you have concerns about the presence of another infection including coronavirus (COVID-19), please contact your health care provider or visit [email or web address].     Disposition/Instructions    Home care recommended. Follow home care protocol based instructions.    Thank you for taking steps to prevent the spread of this virus.  o Limit your contact with others.  o Wear a simple mask to cover your cough.  o Wash your hands well and often.    Resources    [facility]: About COVID-19: [email or web address]    CDC: What to Do If You're Sick: www.cdc.gov/coronavirus/2019-ncov/about/steps-when-sick.html    CDC: Ending Home Isolation: www.cdc.gov/coronavirus/2019-ncov/hcp/disposition-in-home-patients.html     CDC: Caring for Someone: www.cdc.gov/coronavirus/2019-ncov/if-you-are-sick/care-for-someone.html     [facility]: Interim Guidance for Hospital Discharge to Home: www.health.[facility].[place].us/diseases/coronavirus/hcp/hospdischarge.pdf    [facility] clinical trials (COVID-19 research studies): clinicalaffairs.[facility]-clinical-trials     Below are the COVID-19 hotlines at the [place] [facility] ([facility]). Interpreters are available.   o For health questions: Call 727-829-5410 or 1-755.717.3319 (7 a.m. to 7 p.m.)  o For questions about schools and childcare: Call 606-565-5738 or 1-849.458.7517 (7 a.m. to 7 p.m.)                       Additional Information    Negative: Drug overdose and triager unable to answer question    Negative:  Caller requesting information unrelated to medicine    Negative: Caller requesting a prescription for Strep throat and has a positive culture result    Negative: Rash while taking a medication or within 3 days of stopping it    Negative: Immunization reaction suspected    Negative: Asthma and having symptoms of asthma (cough, wheezing, etc.)    Negative: Breastfeeding questions about mother's medicines and diet    Negative: MORE THAN A DOUBLE DOSE of a prescription or over-the-counter (OTC) drug    Negative: DOUBLE DOSE (an extra dose or lesser amount) of over-the-counter (OTC) drug and any symptoms (e.g., dizziness, nausea, pain, sleepiness)    Negative: DOUBLE DOSE (an extra dose or lesser amount) of prescription drug and any symptoms (e.g., dizziness, nausea, pain, sleepiness)    Negative: Took another person's prescription drug    Negative: DOUBLE DOSE (an extra dose or lesser amount) of prescription drug and NO symptoms (Exception: a double dose of antibiotics)    Negative: Diabetes drug error or overdose (e.g., took wrong type of insulin or took extra dose)    Negative: Caller has medication question about med not prescribed by PCP and triager unable to answer question (e.g., compatibility with other med, storage)    Negative: Request for URGENT new prescription or refill of 'essential' medication (i.e., likelihood of harm to patient if not taken) and triager unable to fill per department policy    Negative: Prescription not at pharmacy and was prescribed today by PCP    Negative: Pharmacy calling with prescription questions and triager unable to answer question    Negative: Caller has urgent medication question about med that PCP prescribed and triager unable to answer question    Negative: Caller has NON-URGENT medication question about med that PCP prescribed and triager unable to answer question    Negative: Caller requesting a NON-URGENT new prescription or refill and triager unable to refill per  department policy    Caller has medication question only, adult not sick, and triager answers question    Negative: DOUBLE DOSE (an extra dose or lesser amount) of antibiotic drug and NO symptoms    Negative: DOUBLE DOSE (an extra dose or lesser amount) of over-the-counter (OTC) drug and NO symptoms    Protocols used: MEDICATION QUESTION CALL-A-OH

## 2022-03-12 ENCOUNTER — NURSE TRIAGE (OUTPATIENT)
Dept: NURSING | Facility: CLINIC | Age: 77
End: 2022-03-12
Payer: MEDICARE

## 2022-03-12 NOTE — TELEPHONE ENCOUNTER
"Routing refill request to provider for review/approval because:  Duplicate request - refill too soon.   Blood pressure    Last Written Prescription Date:  3/9/2022  Last Fill Quantity: 90,  # refills: 3   Last office visit provider:  9/24/2021     Requested Prescriptions   Pending Prescriptions Disp Refills     metoprolol succinate ER (TOPROL-XL) 100 MG 24 hr tablet [Pharmacy Med Name: METOPROLOL SUCC  MG TAB] 90 tablet 1     Sig: TAKE 1 TABLET BY MOUTH EVERY DAY       Beta-Blockers Protocol Failed - 3/11/2022 12:00 PM        Failed - Blood pressure under 140/90 in past 12 months     BP Readings from Last 3 Encounters:   10/08/21 (!) 151/85   09/24/21 (!) 172/95   07/13/21 131/84                 Passed - Patient is age 6 or older        Passed - Recent (12 mo) or future (30 days) visit within the authorizing provider's specialty     Patient has had an office visit with the authorizing provider or a provider within the authorizing providers department within the previous 12 mos or has a future within next 30 days. See \"Patient Info\" tab in inbasket, or \"Choose Columns\" in Meds & Orders section of the refill encounter.              Passed - Medication is active on med list        Routing refill request to provider for review/approval because:  Duplicate request - Refill too soon.    Last Written Prescription Date:  3/9/2022  Last Fill Quantity: 90,  # refills: 3      levothyroxine (SYNTHROID/LEVOTHROID) 75 MCG tablet [Pharmacy Med Name: LEVOTHYROXINE 75 MCG TABLET] 90 tablet 1     Sig: TAKE 1 TABLET BY MOUTH DAILY AT 6:00 AM.       Thyroid Protocol Passed - 3/11/2022 12:00 PM        Passed - Patient is 12 years or older        Passed - Recent (12 mo) or future (30 days) visit within the authorizing provider's specialty     Patient has had an office visit with the authorizing provider or a provider within the authorizing providers department within the previous 12 mos or has a future within next 30 days. See " "\"Patient Info\" tab in inbasket, or \"Choose Columns\" in Meds & Orders section of the refill encounter.              Passed - Medication is active on med list        Passed - Normal TSH on file in past 12 months     Recent Labs   Lab Test 09/24/21  0842   TSH 1.32              Passed - No active pregnancy on record     If patient is pregnant or has had a positive pregnancy test, please check TSH.          Passed - No positive pregnancy test in past 12 months     If patient is pregnant or has had a positive pregnancy test, please check TSH.               Brandi Pickett RN 03/11/22 10:27 PM  "

## 2022-03-12 NOTE — TELEPHONE ENCOUNTER
On 3/9   1) metoprolol succinate ER (TOPROL-XL) 100 MG 24 hr tablet  and  2) levothyroxine (SYNTHROID/LEVOTHROID) 75 MCG tablet  Were e-scribed to [facility][id number] - [place], [place] .  Patient calling because the pharmacy did not receive the medication refill.  Writer called into the pharmacist the two medications.      When patient returns in [place] in May is planning on getting her yearly physical.    Kary Miramontes RN, [facility] Triage Nurse Advisor      Reason for Disposition    [1] Prescription prescribed recently is not at pharmacy AND [2] triager has access to patient's EMR AND [3] prescription is recorded in the EMR    Protocols used: MEDICATION QUESTION CALL-A-AH

## 2022-03-14 RX ORDER — LEVOTHYROXINE SODIUM 75 UG/1
TABLET ORAL
Qty: 90 TABLET | Refills: 1 | Status: SHIPPED | OUTPATIENT
Start: 2022-03-14 | End: 2023-02-20

## 2022-03-14 RX ORDER — METOPROLOL SUCCINATE 100 MG/1
TABLET, EXTENDED RELEASE ORAL
Qty: 90 TABLET | Refills: 1 | Status: SHIPPED | OUTPATIENT
Start: 2022-03-14 | End: 2022-05-02 | Stop reason: DRUGHIGH

## 2022-04-10 DIAGNOSIS — E61.1 IRON DEFICIENCY: ICD-10-CM

## 2022-04-12 RX ORDER — FERROUS SULFATE 325(65) MG
TABLET ORAL
Qty: 100 TABLET | Refills: 0 | Status: SHIPPED | OUTPATIENT
Start: 2022-04-12

## 2022-04-13 NOTE — TELEPHONE ENCOUNTER
"Last Written Prescription Date:  9/27/2021  Last Fill Quantity: 100,  # refills: 0   Last office visit provider:  9/24/2021     Requested Prescriptions   Pending Prescriptions Disp Refills     ferrous sulfate (FEROSUL) 325 (65 Fe) MG tablet [Pharmacy Med Name: FERROUS SULFATE 325 MG TABLET] 100 tablet 0     Sig: TAKE 1 TABLET BY MOUTH EVERY OTHER DAY       Iron Supplements Passed - 4/10/2022  7:31 AM        Passed - Patient is 12 years of age or older        Passed - Recent (12 mo) or future (30 days) visit within the authorizing provider's specialty     Patient has had an office visit with the authorizing provider or a provider within the authorizing providers department within the previous 12 mos or has a future within next 30 days. See \"Patient Info\" tab in inbasket, or \"Choose Columns\" in Meds & Orders section of the refill encounter.              Passed - Hgb OR Hct on record within the past 12 mos.     Patient need only have had a HGB or HCT on file in the past 12 mos. That result does not need to be normal.    Recent Labs   Lab Test 09/24/21  0842 06/28/21  1318 06/22/21  0808   HGB 12.2 10.8* 9.5*     Recent Labs   Lab Test 09/24/21  0842 06/22/21  0808 06/11/19  1015   HCT 39.5 30.8* 34.3*       Please verify a HGB or HCT has been checked SINCE THE LAST DOSE CHANGE.            Passed - Medication is active on med list             Padmini Madera RN 04/12/22 10:57 PM  "

## 2022-04-29 RX ORDER — SODIUM FLUORIDE 6 MG/ML
PASTE, DENTIFRICE DENTAL
COMMUNITY
Start: 2022-02-10

## 2022-05-02 ENCOUNTER — OFFICE VISIT (OUTPATIENT)
Dept: FAMILY MEDICINE | Facility: CLINIC | Age: 77
End: 2022-05-02
Payer: MEDICARE

## 2022-05-02 ENCOUNTER — MYC MEDICAL ADVICE (OUTPATIENT)
Dept: FAMILY MEDICINE | Facility: CLINIC | Age: 77
End: 2022-05-02

## 2022-05-02 VITALS
HEART RATE: 73 BPM | TEMPERATURE: 98.1 F | HEIGHT: 60 IN | WEIGHT: 159 LBS | DIASTOLIC BLOOD PRESSURE: 96 MMHG | BODY MASS INDEX: 31.22 KG/M2 | SYSTOLIC BLOOD PRESSURE: 162 MMHG | RESPIRATION RATE: 18 BRPM

## 2022-05-02 DIAGNOSIS — Z23 IMMUNIZATION DUE: ICD-10-CM

## 2022-05-02 DIAGNOSIS — J30.89 ENVIRONMENTAL AND SEASONAL ALLERGIES: ICD-10-CM

## 2022-05-02 DIAGNOSIS — G47.33 OBSTRUCTIVE SLEEP APNEA SYNDROME IN ADULT: ICD-10-CM

## 2022-05-02 DIAGNOSIS — E55.9 VITAMIN D DEFICIENCY: ICD-10-CM

## 2022-05-02 DIAGNOSIS — Z78.0 ASYMPTOMATIC MENOPAUSAL STATE: ICD-10-CM

## 2022-05-02 DIAGNOSIS — D12.6 TUBULAR ADENOMA OF COLON: ICD-10-CM

## 2022-05-02 DIAGNOSIS — I10 ESSENTIAL HYPERTENSION, BENIGN: ICD-10-CM

## 2022-05-02 DIAGNOSIS — E53.8 VITAMIN B12 DEFICIENCY: ICD-10-CM

## 2022-05-02 DIAGNOSIS — I89.0 LYMPHEDEMA: ICD-10-CM

## 2022-05-02 DIAGNOSIS — I10 ESSENTIAL HYPERTENSION: Primary | ICD-10-CM

## 2022-05-02 DIAGNOSIS — Z00.00 ENCOUNTER FOR MEDICARE ANNUAL WELLNESS EXAM: Primary | ICD-10-CM

## 2022-05-02 DIAGNOSIS — E03.9 ACQUIRED HYPOTHYROIDISM: ICD-10-CM

## 2022-05-02 DIAGNOSIS — E78.2 MIXED HYPERLIPIDEMIA: ICD-10-CM

## 2022-05-02 DIAGNOSIS — M81.0 AGE-RELATED OSTEOPOROSIS WITHOUT CURRENT PATHOLOGICAL FRACTURE: ICD-10-CM

## 2022-05-02 DIAGNOSIS — F51.04 PSYCHOPHYSIOLOGICAL INSOMNIA: ICD-10-CM

## 2022-05-02 PROBLEM — K59.01 SLOW TRANSIT CONSTIPATION: Status: ACTIVE | Noted: 2017-08-07

## 2022-05-02 PROBLEM — R53.81 PHYSICAL DECONDITIONING: Status: ACTIVE | Noted: 2017-08-07

## 2022-05-02 PROBLEM — E04.2 NON-TOXIC MULTINODULAR GOITER: Status: ACTIVE | Noted: 2022-05-02

## 2022-05-02 PROBLEM — H40.9 GLAUCOMA: Status: ACTIVE | Noted: 2019-06-13

## 2022-05-02 LAB
ALBUMIN SERPL-MCNC: 3.7 G/DL (ref 3.5–5)
ALP SERPL-CCNC: 79 U/L (ref 45–120)
ALT SERPL W P-5'-P-CCNC: 27 U/L (ref 0–45)
ANION GAP SERPL CALCULATED.3IONS-SCNC: 11 MMOL/L (ref 5–18)
AST SERPL W P-5'-P-CCNC: 26 U/L (ref 0–40)
BILIRUB SERPL-MCNC: 0.5 MG/DL (ref 0–1)
BUN SERPL-MCNC: 14 MG/DL (ref 8–28)
CALCIUM SERPL-MCNC: 9.3 MG/DL (ref 8.5–10.5)
CHLORIDE BLD-SCNC: 103 MMOL/L (ref 98–107)
CHOLEST SERPL-MCNC: 171 MG/DL
CO2 SERPL-SCNC: 27 MMOL/L (ref 22–31)
CREAT SERPL-MCNC: 0.61 MG/DL (ref 0.6–1.1)
ERYTHROCYTE [DISTWIDTH] IN BLOOD BY AUTOMATED COUNT: 13.8 % (ref 10–15)
FASTING STATUS PATIENT QL REPORTED: YES
GFR SERPL CREATININE-BSD FRML MDRD: >90 ML/MIN/1.73M2
GLUCOSE BLD-MCNC: 93 MG/DL (ref 70–125)
HCT VFR BLD AUTO: 42.2 % (ref 35–47)
HDLC SERPL-MCNC: 63 MG/DL
HGB BLD-MCNC: 13.5 G/DL (ref 11.7–15.7)
IRON SATN MFR SERPL: 49 % (ref 20–50)
IRON SERPL-MCNC: 140 UG/DL (ref 42–175)
LDLC SERPL CALC-MCNC: 96 MG/DL
MCH RBC QN AUTO: 29.9 PG (ref 26.5–33)
MCHC RBC AUTO-ENTMCNC: 32 G/DL (ref 31.5–36.5)
MCV RBC AUTO: 94 FL (ref 78–100)
PLATELET # BLD AUTO: 258 10E3/UL (ref 150–450)
POTASSIUM BLD-SCNC: 4.4 MMOL/L (ref 3.5–5)
PROT SERPL-MCNC: 6.7 G/DL (ref 6–8)
RBC # BLD AUTO: 4.51 10E6/UL (ref 3.8–5.2)
SODIUM SERPL-SCNC: 141 MMOL/L (ref 136–145)
TIBC SERPL-MCNC: 284 UG/DL (ref 313–563)
TRANSFERRIN SERPL-MCNC: 227 MG/DL (ref 212–360)
TRIGL SERPL-MCNC: 61 MG/DL
TSH SERPL DL<=0.005 MIU/L-ACNC: 0.89 UIU/ML (ref 0.3–5)
WBC # BLD AUTO: 4.4 10E3/UL (ref 4–11)

## 2022-05-02 PROCEDURE — 80053 COMPREHEN METABOLIC PANEL: CPT | Performed by: FAMILY MEDICINE

## 2022-05-02 PROCEDURE — 84466 ASSAY OF TRANSFERRIN: CPT | Performed by: FAMILY MEDICINE

## 2022-05-02 PROCEDURE — G0439 PPPS, SUBSEQ VISIT: HCPCS | Performed by: FAMILY MEDICINE

## 2022-05-02 PROCEDURE — 36415 COLL VENOUS BLD VENIPUNCTURE: CPT | Performed by: FAMILY MEDICINE

## 2022-05-02 PROCEDURE — 85027 COMPLETE CBC AUTOMATED: CPT | Performed by: FAMILY MEDICINE

## 2022-05-02 PROCEDURE — 91306 COVID-19,PF,MODERNA (18+ YRS BOOSTER .25ML): CPT | Performed by: FAMILY MEDICINE

## 2022-05-02 PROCEDURE — 0064A COVID-19,PF,MODERNA (18+ YRS BOOSTER .25ML): CPT | Performed by: FAMILY MEDICINE

## 2022-05-02 PROCEDURE — 82306 VITAMIN D 25 HYDROXY: CPT | Performed by: FAMILY MEDICINE

## 2022-05-02 PROCEDURE — 80061 LIPID PANEL: CPT | Performed by: FAMILY MEDICINE

## 2022-05-02 PROCEDURE — 83540 ASSAY OF IRON: CPT | Performed by: FAMILY MEDICINE

## 2022-05-02 PROCEDURE — 84443 ASSAY THYROID STIM HORMONE: CPT | Performed by: FAMILY MEDICINE

## 2022-05-02 RX ORDER — METOPROLOL SUCCINATE 200 MG/1
200 TABLET, EXTENDED RELEASE ORAL DAILY
Qty: 90 TABLET | Refills: 4 | Status: SHIPPED | OUTPATIENT
Start: 2022-05-02 | End: 2023-05-24

## 2022-05-02 RX ORDER — LISINOPRIL 40 MG/1
40 TABLET ORAL DAILY
Qty: 90 TABLET | Refills: 3 | Status: SHIPPED | OUTPATIENT
Start: 2022-05-02 | End: 2023-05-24

## 2022-05-02 RX ORDER — MOMETASONE FUROATE MONOHYDRATE 50 UG/1
SPRAY, METERED NASAL
COMMUNITY

## 2022-05-02 ASSESSMENT — ACTIVITIES OF DAILY LIVING (ADL): CURRENT_FUNCTION: HOUSEWORK REQUIRES ASSISTANCE

## 2022-05-02 NOTE — PATIENT INSTRUCTIONS
Patient Education   Personalized Prevention Plan  You are due for the preventive services outlined below.  Your care team is available to assist you in scheduling these services.  If you have already completed any of these items, please share that information with your care team to update in your medical record.  Health Maintenance Due   Topic Date Due     FALL RISK ASSESSMENT  11/08/2019     COVID-19 Vaccine (4 - Booster for Pfizer series) 01/26/2022       Exercise for a Healthier Heart  You may wonder how you can improve the health of your heart. If you re thinking about exercise, you re on the right track. You don t need to become an athlete. But you do need a certain amount of brisk exercise to help strengthen your heart. If you have been diagnosed with a heart condition, your healthcare provider may advise exercise to help stabilize your condition. To help make exercise a habit, choose safe, fun activities.      Exercise with a friend. When activity is fun, you're more likely to stick with it.   Before you start  Check with your healthcare provider before starting an exercise program. This is especially important if you have not been active for a while. It's also important if you have a long-term (chronic) health problem such as heart disease, diabetes, or obesity. Or if you are at high risk for having these problems.   Why exercise?  Exercising regularly offers many healthy rewards. It can help you do all of the following:     Improve your blood cholesterol level to help prevent further heart trouble    Lower your blood pressure to help prevent a stroke or heart attack    Control diabetes, or reduce your risk of getting this disease    Improve your heart and lung function    Reach and stay at a healthy weight    Make your muscles stronger so you can stay active    Prevent falls and fractures by slowing the loss of bone mass (osteoporosis)    Manage stress better    Reduce your blood pressure    Improve your  sense of self and your body image  Exercise tips      Ease into your routine. Set small goals. Then build on them. If you are not sure what your activity level should be, talk with your healthcare provider first before starting an exercise routine.    Exercise on most days. Aim for a total of 150 minutes (2 hours and 30 minutes) or more of moderate-intensity aerobic activity each week. Or 75 minutes (1 hour and 15 minutes) or more of vigorous-intensity aerobic activity each week. Or try for a combination of both. Moderate activity means that you breathe heavier and your heart rate increases but you can still talk. Think about doing 40 minutes of moderate exercise, 3 to 4 times a week. For best results, activity should last for about 40 minutes to lower blood pressure and cholesterol. It's OK to work up to the 40-minute period over time. Examples of moderate-intensity activity are walking 1 mile in 15 minutes. Or doing 30 to 45 minutes of yard work.    Step up your daily activity level.  Along with your exercise program, try being more active the whole day. Walk instead of drive. Or park further away so that you take more steps each day. Do more household tasks or yard work. You may not be able to meet the advised mount of physical activity. But doing some moderate- or vigorous-intensity aerobic activity can help reduce your risk for heart disease. Your healthcare provider can help you figure out what is best for you.    Choose 1 or more activities you enjoy.  Walking is one of the easiest things you can do. You can also try swimming, riding a bike, dancing, or taking an exercise class.    When to call your healthcare provider  Call your healthcare provider if you have any of these:     Chest pain or feel dizzy or lightheaded    Burning, tightness, pressure, or heaviness in your chest, neck, shoulders, back, or arms    Abnormal shortness of breath    More joint or muscle pain    A very fast or irregular heartbeat  (palpitations)  [employer] last reviewed this educational content on 7/1/2019 2000-2021 [employer]. All rights reserved. This information is not intended as a substitute for professional medical care. Always follow your healthcare professional's instructions.          Understanding USDA MyPlate  The USDA has guidelines to help you make healthy food choices. These are called MyPlate. MyPlate shows the food groups that make up healthy meals using the image of a place setting. Before you eat, think about the healthiest choices for what to put on your plate or in your cup or bowl. To learn more about building a healthy plate, visit www.choosemyplate.gov.    The food groups    Fruits. Any fruit or 100% fruit juice counts as part of the Fruit Group. Fruits may be fresh, canned, frozen, or dried, and may be whole, cut-up, or pureed. Make 1/2 of your plate fruits and vegetables.    Vegetables. Any vegetable or 100% vegetable juice counts as a member of the Vegetable Group. Vegetables may be fresh, frozen, canned, or dried. They can be served raw or cooked and may be whole, cut-up, or mashed. Make 1/2 of your plate fruits and vegetables.    Grains. All foods made from grains are part of the Grains Group. These include wheat, rice, oats, cornmeal, and barley. Grains are often used to make foods such as bread, pasta, oatmeal, cereal, tortillas, and grits. Grains should be no more than 1/4 of your plate. At least half of your grains should be whole grains.    Protein. This group includes meat, poultry, seafood, beans and peas, eggs, processed soy products (such as tofu), nuts (including nut butters), and seeds. Make protein choices no more than 1/4 of your plate. Meat and poultry choices should be lean or low fat.    Dairy. The Dairy Group includes all fluid milk products and foods made from milk that contain calcium, such as yogurt and cheese. (Foods that have little calcium, such as cream, butter, and cream  cheese, are not part of this group.) Most dairy choices should be low-fat or fat-free.    Oils. Oils aren't a food group, but they do contain essential nutrients. However it's important to watch your intake of oils. These are fats that are liquid at room temperature. They include canola, corn, olive, soybean, vegetable, and sunflower oil. Foods that are mainly oil include mayonnaise, certain salad dressings, and soft margarines. You likely already get your daily oil allowance from the foods you eat.  Things to limit  Eating healthy also means limiting these things in your diet:       Salt (sodium). Many processed foods have a lot of sodium. To keep sodium intake down, eat fresh vegetables, meats, poultry, and seafood when possible. Purchase low-sodium, reduced-sodium, or no-salt-added food products at the store. And don't add salt to your meals at home. Instead, season them with herbs and spices such as dill, oregano, cumin, and paprika. Or try adding flavor with lemon or lime zest and juice.    Saturated fat. Saturated fats are most often found in animal products such as beef, pork, and chicken. They are often solid at room temperature, such as butter. To reduce your saturated fat intake, choose leaner cuts of meat and poultry. And try healthier cooking methods such as grilling, broiling, roasting, or baking. For a simple lower-fat swap, use plain nonfat yogurt instead of mayonnaise when making potato salad or macaroni salad.    Added sugars. These are sugars added to foods. They are in foods such as ice cream, candy, soda, fruit drinks, sports drinks, energy drinks, cookies, pastries, jams, and syrups. Cut down on added sugars by sharing sweet treats with a family member or friend. You can also choose fruit for dessert, and drink water or other unsweetened beverages.     [employer] last reviewed this educational content on 6/1/2020 2000-2021 [employer]. All rights reserved. This information is not  intended as a substitute for professional medical care. Always follow your healthcare professional's instructions.        Activities of Daily Living    Your Health Risk Assessment indicates you have difficulties with activities of daily living such as housework, bathing, preparing meals, taking medication, etc. Please make a follow up appointment for us to address this issue in more detail.

## 2022-05-02 NOTE — TELEPHONE ENCOUNTER
Continue lisinopril 40 mg.  Increase metoprolol ER  --OLD dose: 100 mg  --NEW dose: 200 mg  Recheck blood pressure in 2 weeks.    Diagnoses and all orders for this visit:    Essential hypertension  -     metoprolol succinate ER (TOPROL-XL) 200 MG 24 hr tablet; Take 1 tablet (200 mg) by mouth daily

## 2022-05-02 NOTE — PROGRESS NOTES
She is at risk for lack of exercise and has been provided with information to increase physical activity for the benefit of her well-being.  The patient was counseled and encouraged to consider modifying their diet and eating habits. She was provided with information on recommended healthy diet options.  The patient reports that she has difficulty with activities of daily living. I have asked that the patient make a follow up appointment in 2 weeks where this issue will be further evaluated and addressed.    She will return in two weeks for BP recheck.

## 2022-05-02 NOTE — PROGRESS NOTES
"SUBJECTIVE:   Claudia Powers is a 77 year old female who presents for Preventive Visit.      Patient has been advised of split billing requirements and indicates understanding: Yes  Are you in the first 12 months of your Medicare coverage?  No    Healthy Habits:     In general, how would you rate your overall health?  Good    Frequency of exercise:  1 day/week    Duration of exercise:  15-30 minutes    Do you usually eat at least 4 servings of fruit and vegetables a day, include whole grains    & fiber and avoid regularly eating high fat or \"junk\" foods?  No    Taking medications regularly:  Yes    Medication side effects:  Not applicable    Ability to successfully perform activities of daily living:  Housework requires assistance    Home Safety:  No safety concerns identified    Hearing Impairment:  No hearing concerns    In the past 6 months, have you been bothered by leaking of urine?  No    In general, how would you rate your overall mental or emotional health?  Good      PHQ-2 Total Score: 0    Additional concerns today:  No    Do you feel safe in your environment? Yes    Have you ever done Advance Care Planning? (For example, a Health Directive, POLST, or a discussion with a medical provider or your loved ones about your wishes): Yes, advance care planning is on file.     The 10-year ASCVD risk score ([place] ROBERTO Beasley., et al., 2013) is: 31.1%    Values used to calculate the score:      Age: 77 years      Sex: Female      Is Non- : No      Diabetic: No      Tobacco smoker: No      Systolic Blood Pressure: 162 mmHg      Is BP treated: Yes      HDL Cholesterol: 72 mg/dL      Total Cholesterol: 152 mg/dL       Fall risk  Fallen 2 or more times in the past year?: No  Any fall with injury in the past year?: No  click delete button to remove this line now  Cognitive Screening   1) Repeat 3 items (Leader, Season, Table)    2) Clock draw: NORMAL  3) 3 item recall: Recalls 3 objects  Results: 3 items " "recalled: COGNITIVE IMPAIRMENT LESS LIKELY    Mini-CogTM Copyright EARNESTINE [employer]oto. Licensed by the author for use in [facility]; reprinted with permission (amalia@[facility]). All rights reserved.      Do you have sleep apnea, excessive snoring or daytime drowsiness?: no    Reviewed and updated as needed this visit by clinical staff   Tobacco   Meds    Surg Hx  Fam Hx            Reviewed and updated as needed this visit by Provider   Tobacco      Surg Hx  Fam Hx           Social History     Tobacco Use     Smoking status: Never Smoker     Smokeless tobacco: Never Used   Substance Use Topics     Alcohol use: Yes     Alcohol/week: 3.0 standard drinks     Types: 3 Standard drinks or equivalent per week     Comment: Daily caffeine     If you drink alcohol do you typically have >3 drinks per day or >7 drinks per week? No    Alcohol Use 5/2/2022   Prescreen: >3 drinks/day or >7 drinks/week? No   Prescreen: >3 drinks/day or >7 drinks/week? -   No flowsheet data found.      Current providers sharing in care for this patient include:   Patient Care Team:  Katia Kenny MD as PCP - General (Family Practice)  Katia Kenny MD as Assigned PCP    The following health maintenance items are reviewed in Epic and correct as of today:  Health Maintenance Due   Topic Date Due     FALL RISK ASSESSMENT  11/08/2019     COLORECTAL CANCER SCREENING  01/09/2022     COVID-19 Vaccine (4 - Booster for Pfizer series) 01/26/2022       Pertinent mammograms are reviewed under the imaging tab.      OBJECTIVE:   BP (!) 162/96 (BP Location: Right arm, Patient Position: Sitting, Cuff Size: Adult Regular)   Pulse 73   Temp 98.1  F (36.7  C) (Temporal)   Resp 18   Ht 1.52 m (4' 11.84\")   Wt 72.1 kg (159 lb)   BMI 31.22 kg/m   Estimated body mass index is 31.22 kg/m  as calculated from the following:    Height as of this encounter: 1.52 m (4' 11.84\").    Weight as of this encounter: 72.1 kg (159 lb).     BP Readings from Last 6 " Encounters:   05/02/22 (!) 162/96   10/08/21 (!) 151/85   09/24/21 (!) 172/95   07/13/21 131/84   06/30/21 (!) 153/79   06/23/21 136/74       Physical Exam  GENERAL: healthy, alert and no distress  EYES: Eyes grossly normal to inspection, PERRL and conjunctivae and sclerae normal  HENT: ear canals and TM's normal, nose and mouth without ulcers or lesions  NECK: no adenopathy, no asymmetry, masses, or scars and thyroid normal to palpation  RESP: lungs clear to auscultation - no rales, rhonchi or wheezes  BREAST: breast reconstruction bilaterally. No concerning masses or skin changes  CV: regular rate and rhythm, normal S1 S2, no S3 or S4, no murmur, click or rub, no peripheral edema and peripheral pulses strong  ABDOMEN: surgical scarring well healed, soft, nontender, no hepatosplenomegaly, no masses and bowel sounds normal  MS:  no edema  SKIN: no suspicious lesions or rashes  NEURO: Normal strength and tone, mentation intact and speech normal  PSYCH: mentation appears normal, affect normal/bright    Diagnostic Test Results:  Labs reviewed in Epic    ASSESSMENT / PLAN:   Claudia was seen today for annual visit.    Diagnoses and all orders for this visit:    Encounter for Medicare annual wellness exam    Immunization due  -     COVID-19,PF,MODERNA (18+ YRS BOOSTER .25ML); Future    Vitamin B12 deficiency  -     CBC with platelets; Future  -     Iron binding panel; Future  -     CBC with platelets  -     Iron binding panel    Tubular adenoma of colon  -     Adult Gastro Ref - Procedure Only; Future    Obstructive sleep apnea syndrome in adult    Asymptomatic menopausal state  -     Cancel: DX Hip/Pelvis/Spine; Future    Lymphedema    Psychophysiological insomnia    Mixed hyperlipidemia  -     Lipid Profile; Future  -     Lipid Profile    Environmental and seasonal allergies    Age-related osteoporosis without current pathological fracture    Essential hypertension, benign  -     lisinopril (ZESTRIL) 40 MG tablet; Take 1  "tablet (40 mg) by mouth daily  -     Comprehensive metabolic panel; Future  -     Comprehensive metabolic panel    Acquired hypothyroidism  -     TSH; Future  -     TSH    Vitamin D deficiency  -     Vitamin D Deficiency; Future  -     Vitamin D Deficiency    Other orders  -     COVID-19,PF,MODERNA (18+ YRS BOOSTER .25ML)        COUNSELING:  Reviewed preventive health counseling, as reflected in patient instructions    Estimated body mass index is 31.22 kg/m  as calculated from the following:    Height as of this encounter: 1.52 m (4' 11.84\").    Weight as of this encounter: 72.1 kg (159 lb).    Weight management plan: Discussed healthy diet and exercise guidelines    She reports that she has never smoked. She has never used smokeless tobacco.      Appropriate preventive services were discussed with this patient, including applicable screening as appropriate for cardiovascular disease, diabetes, osteopenia/osteoporosis, and glaucoma.  As appropriate for age/gender, discussed screening for colorectal cancer, prostate cancer, breast cancer, and cervical cancer. Checklist reviewing preventive services available has been given to the patient.    Reviewed patients plan of care and provided an AVS. The Basic Care Plan (routine screening as documented in Health Maintenance) for Claudia meets the Care Plan requirement. This Care Plan has been established and reviewed with the Patient.    Counseling Resources:  ATP IV Guidelines  Pooled Cohorts Equation Calculator  Breast Cancer Risk Calculator  Breast Cancer: Medication to Reduce Risk  FRAX Risk Assessment  ICSI Preventive Guidelines  Dietary Guidelines for Americans, 2010  [employer]'s MyPlate  ASA Prophylaxis  Lung CA Screening    Katia Kenny MD  [facility]    Identified Health Risks:  "

## 2022-05-03 LAB — DEPRECATED CALCIDIOL+CALCIFEROL SERPL-MC: 45 UG/L (ref 20–75)

## 2022-05-16 ENCOUNTER — ALLIED HEALTH/NURSE VISIT (OUTPATIENT)
Dept: PEDIATRICS | Facility: CLINIC | Age: 77
End: 2022-05-16
Payer: MEDICARE

## 2022-05-16 VITALS — HEART RATE: 54 BPM | SYSTOLIC BLOOD PRESSURE: 154 MMHG | DIASTOLIC BLOOD PRESSURE: 80 MMHG

## 2022-05-16 DIAGNOSIS — I10 ESSENTIAL HYPERTENSION, BENIGN: Primary | ICD-10-CM

## 2022-05-16 PROCEDURE — 99207 PR NO CHARGE NURSE ONLY: CPT

## 2022-05-16 NOTE — PROGRESS NOTES
Claudia Powers is a 77 year old patient who comes in today for a Blood Pressure check.  She was told to come in for a bp check as her BP meds were adjusted 2 weeks ago.    Initial BP:  BP (!) 154/80   Pulse 54      Data Unavailable  Disposition: results routed to provider

## 2022-06-08 DIAGNOSIS — J30.1 SEASONAL ALLERGIC RHINITIS DUE TO POLLEN: Primary | ICD-10-CM

## 2022-06-08 DIAGNOSIS — M25.471 SWELLING OF BOTH ANKLES: ICD-10-CM

## 2022-06-08 DIAGNOSIS — M25.472 SWELLING OF BOTH ANKLES: ICD-10-CM

## 2022-06-09 RX ORDER — FLUTICASONE PROPIONATE 50 MCG
SPRAY, SUSPENSION (ML) NASAL
Qty: 48 ML | Refills: 1 | Status: SHIPPED | OUTPATIENT
Start: 2022-06-09 | End: 2022-12-27

## 2022-06-09 RX ORDER — FUROSEMIDE 40 MG
40 TABLET ORAL EVERY MORNING
Qty: 90 TABLET | Refills: 4 | Status: SHIPPED | OUTPATIENT
Start: 2022-06-09 | End: 2023-05-24

## 2022-06-09 NOTE — TELEPHONE ENCOUNTER
Due for BP check. Please schedule nurse visit.     No future appointments.   Health Maintenance Due   Topic Date Due     COLORECTAL CANCER SCREENING  01/09/2022     BP Readings from Last 3 Encounters:   05/16/22 (!) 154/80   05/02/22 (!) 162/96   10/08/21 (!) 151/85     Claudia was seen today for medication refill.    Diagnoses and all orders for this visit:    Swelling of both ankles

## 2022-06-09 NOTE — TELEPHONE ENCOUNTER
"Routing refill request to provider for review/approval because:  Medication is reported/historical  Pt reports is taking lasix 40 mg     Last Written Prescription Date:  12/20/21  Last Fill Quantity: 90,  # refills: 0   Last office visit provider:  5/2/22     Requested Prescriptions   Pending Prescriptions Disp Refills     fluticasone (FLONASE) 50 MCG/ACT nasal spray [Pharmacy Med Name: FLUTICASONE PROP 50 MCG SPRAY] 48 mL 1     Sig: SPRAY 2 SPRAYS INTO EACH NOSTRIL EVERY DAY       Nasal Allergy Protocol Passed - 6/8/2022  5:05 AM        Passed - Patient is age 12 or older        Passed - Recent (12 mo) or future (30 days) visit within the authorizing provider's specialty     Patient has had an office visit with the authorizing provider or a provider within the authorizing providers department within the previous 12 mos or has a future within next 30 days. See \"Patient Info\" tab in inbasket, or \"Choose Columns\" in Meds & Orders section of the refill encounter.              Passed - Medication is active on med list           furosemide (LASIX) 20 MG tablet [Pharmacy Med Name: FUROSEMIDE 20 MG TABLET] 90 tablet 1     Sig: TAKE 1 TABLET BY MOUTH EVERY DAY AS NEEDED       Diuretics (Including Combos) Protocol Failed - 6/8/2022  5:05 AM        Failed - Blood pressure under 140/90 in past 12 months     BP Readings from Last 3 Encounters:   05/16/22 (!) 154/80   05/02/22 (!) 162/96   10/08/21 (!) 151/85                 Passed - Recent (12 mo) or future (30 days) visit within the authorizing provider's specialty     Patient has had an office visit with the authorizing provider or a provider within the authorizing providers department within the previous 12 mos or has a future within next 30 days. See \"Patient Info\" tab in inbasket, or \"Choose Columns\" in Meds & Orders section of the refill encounter.              Passed - Medication is active on med list        Passed - Patient is age 18 or older        Passed - No active " pregancy on record        Passed - Normal serum creatinine on file in past 12 months     Recent Labs   Lab Test 05/02/22  1122   CR 0.61              Passed - Normal serum potassium on file in past 12 months     Recent Labs   Lab Test 05/02/22  1122   POTASSIUM 4.4                    Passed - Normal serum sodium on file in past 12 months     Recent Labs   Lab Test 05/02/22  1122                 Passed - No positive pregnancy test in past 12 months             Magalie Layton RN 06/08/22 7:06 PM

## 2022-06-22 ENCOUNTER — ALLIED HEALTH/NURSE VISIT (OUTPATIENT)
Dept: PEDIATRICS | Facility: CLINIC | Age: 77
End: 2022-06-22
Payer: MEDICARE

## 2022-06-22 VITALS — HEART RATE: 53 BPM | DIASTOLIC BLOOD PRESSURE: 79 MMHG | SYSTOLIC BLOOD PRESSURE: 141 MMHG

## 2022-06-22 DIAGNOSIS — I10 ESSENTIAL HYPERTENSION, BENIGN: Primary | ICD-10-CM

## 2022-06-22 PROCEDURE — 99207 PR NO CHARGE NURSE ONLY: CPT

## 2022-06-22 NOTE — PROGRESS NOTES
Claudia Powers is a 77 year old patient who comes in today for a Blood Pressure check.  Initial BP:  BP (!) 141/79   Pulse 53      53  Disposition: results routed to provider

## 2022-07-05 DIAGNOSIS — N39.41 URGE INCONTINENCE OF URINE: Primary | ICD-10-CM

## 2022-07-05 NOTE — TELEPHONE ENCOUNTER
Reason for Call:  Medication or medication refill:    Do you use a [facility]?  Name of the pharmacy and phone number for the current request:  [facility] [address]    Name of the medication requested: 3 months at a time  please, for Trospium    Other request: enough left to talk a week, please refill ASAP    Can we leave a detailed message on this number? YES    Phone number patient can be reached at: Home number on file [phone] (home)    Best Time: Any    Call taken on 7/5/2022 at 3:10 PM by Gavi Rosado

## 2022-07-06 RX ORDER — TROSPIUM CHLORIDE ER 60 MG/1
60 CAPSULE ORAL
Qty: 90 CAPSULE | Refills: 4 | Status: SHIPPED | OUTPATIENT
Start: 2022-07-06 | End: 2023-05-24

## 2022-07-14 ENCOUNTER — ANCILLARY PROCEDURE (OUTPATIENT)
Dept: ULTRASOUND IMAGING | Facility: CLINIC | Age: 77
End: 2022-07-14
Attending: INTERNAL MEDICINE
Payer: MEDICARE

## 2022-07-14 DIAGNOSIS — E04.2 MULTINODULAR GOITER: ICD-10-CM

## 2022-07-14 PROCEDURE — 76536 US EXAM OF HEAD AND NECK: CPT

## 2022-08-08 ENCOUNTER — TRANSFERRED RECORDS (OUTPATIENT)
Dept: HEALTH INFORMATION MANAGEMENT | Facility: CLINIC | Age: 77
End: 2022-08-08

## 2022-08-23 ENCOUNTER — NURSE TRIAGE (OUTPATIENT)
Dept: FAMILY MEDICINE | Facility: CLINIC | Age: 77
End: 2022-08-23

## 2022-08-23 ENCOUNTER — OFFICE VISIT (OUTPATIENT)
Dept: PEDIATRICS | Facility: CLINIC | Age: 77
End: 2022-08-23
Payer: MEDICARE

## 2022-08-23 VITALS
HEART RATE: 61 BPM | WEIGHT: 162.8 LBS | OXYGEN SATURATION: 95 % | TEMPERATURE: 98.6 F | SYSTOLIC BLOOD PRESSURE: 132 MMHG | RESPIRATION RATE: 16 BRPM | BODY MASS INDEX: 31.96 KG/M2 | DIASTOLIC BLOOD PRESSURE: 74 MMHG

## 2022-08-23 DIAGNOSIS — N30.01 ACUTE CYSTITIS WITH HEMATURIA: Primary | ICD-10-CM

## 2022-08-23 LAB
ALBUMIN UR-MCNC: 100 MG/DL
APPEARANCE UR: CLEAR
BACTERIA #/AREA URNS HPF: ABNORMAL /HPF
BILIRUB UR QL STRIP: NEGATIVE
COLOR UR AUTO: YELLOW
GLUCOSE UR STRIP-MCNC: NEGATIVE MG/DL
HGB UR QL STRIP: ABNORMAL
KETONES UR STRIP-MCNC: ABNORMAL MG/DL
LEUKOCYTE ESTERASE UR QL STRIP: ABNORMAL
NITRATE UR QL: NEGATIVE
PH UR STRIP: 5.5 [PH] (ref 5–7)
RBC #/AREA URNS AUTO: >100 /HPF
SP GR UR STRIP: 1.02 (ref 1–1.03)
SQUAMOUS #/AREA URNS AUTO: ABNORMAL /LPF
UROBILINOGEN UR STRIP-ACNC: 0.2 E.U./DL
WBC #/AREA URNS AUTO: >100 /HPF

## 2022-08-23 PROCEDURE — 99213 OFFICE O/P EST LOW 20 MIN: CPT | Performed by: PHYSICIAN ASSISTANT

## 2022-08-23 PROCEDURE — 81001 URINALYSIS AUTO W/SCOPE: CPT | Performed by: PHYSICIAN ASSISTANT

## 2022-08-23 PROCEDURE — 87186 SC STD MICRODIL/AGAR DIL: CPT | Performed by: PHYSICIAN ASSISTANT

## 2022-08-23 PROCEDURE — 87086 URINE CULTURE/COLONY COUNT: CPT | Mod: 59 | Performed by: PHYSICIAN ASSISTANT

## 2022-08-23 PROCEDURE — 87086 URINE CULTURE/COLONY COUNT: CPT | Performed by: PHYSICIAN ASSISTANT

## 2022-08-23 PROCEDURE — 87186 SC STD MICRODIL/AGAR DIL: CPT | Mod: 59 | Performed by: PHYSICIAN ASSISTANT

## 2022-08-23 RX ORDER — SULFAMETHOXAZOLE/TRIMETHOPRIM 800-160 MG
1 TABLET ORAL 2 TIMES DAILY
Qty: 10 TABLET | Refills: 0 | Status: SHIPPED | OUTPATIENT
Start: 2022-08-23 | End: 2023-05-24

## 2022-08-23 ASSESSMENT — PAIN SCALES - GENERAL: PAINLEVEL: NO PAIN (0)

## 2022-08-23 NOTE — TELEPHONE ENCOUNTER
S-(situation): Patient calling requesting appointment today for possible UTI.     B-(background): Patient's symptoms began 1.5 days ago. Patient is taking cranberry pills, not sure if helping.     A-(assessment): Patient experiencing urinary frequency, burning with urination, vaginal itching, cloudy urine. Patient notes pinkish color on toilet tissue last night, no blood this morning.     R-(recommendations): Per protocol, advised visit today. Offered e-visit, patient declined. Offered UC, patient declined. Scheduled for OV 8/23/22. Reviewed care advice under care tab. Patient verbalized understanding and agreed with plan.       Reason for Disposition    Urinating more frequently than usual (i.e., frequency)    Additional Information    Negative: Shock suspected (e.g., cold/pale/clammy skin, too weak to stand, low BP, rapid pulse)    Negative: Sounds like a life-threatening emergency to the triager    Negative: Followed a female genital area injury (e.g., vagina, vulva)    Negative: Followed a male genital area injury (penis, scrotum)    Negative: Vaginal discharge    Negative: Pus (white, yellow) or bloody discharge from end of penis    Negative: Pain or burning with passing urine (urination) and pregnant    Negative: Pain or burning with passing urine (urination) and female    Negative: Pain or burning with passing urine (urination) and male    Negative: Pain or itching in the vulvar area    Negative: Pain in scrotum is main symptom    Negative: Blood in the urine is main symptom    Negative: Symptoms arising from use of a urinary catheter (e.g., coude, [place])    Negative: Unable to urinate (or only a few drops) > 4 hours and bladder feels very full (e.g., palpable bladder or strong urge to urinate)    Negative: Decreased urination and drinking very little and dehydration suspected (e.g., dark urine, no urine > 12 hours, very dry mouth, very lightheaded)    Negative: Patient sounds very sick or weak to the  triager    Negative: Fever > 100.4 F  (38.0 C)    Negative: Can't control passage of urine (i.e., urinary incontinence) and new-onset (< 2 weeks) or worsening    Negative: Side (flank) or lower back pain present    Protocols used: URINARY SYMPTOMS-AMALIA PETER RN

## 2022-08-23 NOTE — PROGRESS NOTES
Assessment & Plan     Acute cystitis with hematuria  Begin antibiotics. UC pending.  - UA Macro with Reflex to Micro and Culture - lab collect; Future  - UA Macro with Reflex to Micro and Culture - lab collect  - Urine Microscopic Exam  - Urine Culture  - sulfamethoxazole-trimethoprim (BACTRIM DS) 800-160 MG tablet; Take 1 tablet by mouth 2 times daily  - Urine Culture Aerobic Bacterial - lab collect; Future  - Urine Culture Aerobic Bacterial - lab collect    JULIA Ballard [facility] Taylor is a 77 year old presenting for the following health issues:  Urinary Problem      HPI     Genitourinary - Female  Onset/Duration: 2 days  Description:   Painful urination (Dysuria): YES           Frequency: YES  Blood in urine (Hematuria): YES  Delay in urine (Hesitency): YES  Intensity: mild, moderate  Progression of Symptoms:  worsening  Accompanying Signs & Symptoms:  Fever/chills: No  Flank pain: No  Nausea and vomiting: No  Vaginal symptoms: none  Abdominal/Pelvic Pain: No  History:   History of frequent UTI s: YES  History of kidney stones: YES  Sexually Active: No  Possibility of pregnancy: No  Precipitating or alleviating factors: None  Therapies tried and outcome:  cranberry tablets/capsules     Review of Systems   Constitutional, HEENT, cardiovascular, pulmonary, gi and gu systems are negative, except as otherwise noted.      Objective    /74 (BP Location: Right arm, Patient Position: Sitting, Cuff Size: Adult Regular)   Pulse 61   Temp 98.6  F (37  C) (Tympanic)   Resp 16   Wt 73.8 kg (162 lb 12.8 oz)   SpO2 95%   BMI 31.96 kg/m    Body mass index is 31.96 kg/m .  Physical Exam   GENERAL: healthy, alert and no distress  ABD: soft, nontender    Results for orders placed or performed in visit on 08/23/22   UA Macro with Reflex to Micro and Culture - lab collect     Status: Abnormal    Specimen: Urine, Midstream   Result Value Ref Range    Color Urine  Yellow Colorless, Straw, Light Yellow, Yellow    Appearance Urine Clear Clear    Glucose Urine Negative Negative mg/dL    Bilirubin Urine Negative Negative    Ketones Urine Trace (A) Negative mg/dL    Specific Gravity Urine 1.020 1.003 - 1.035    Blood Urine Large (A) Negative    pH Urine 5.5 5.0 - 7.0    Protein Albumin Urine 100  (A) Negative mg/dL    Urobilinogen Urine 0.2 0.2, 1.0 E.U./dL    Nitrite Urine Negative Negative    Leukocyte Esterase Urine Large (A) Negative   Urine Microscopic Exam     Status: Abnormal   Result Value Ref Range    Bacteria Urine Many (A) None Seen /HPF    RBC Urine >100 (A) 0-2 /HPF /HPF    WBC Urine >100 (A) 0-5 /HPF /HPF    Squamous Epithelials Urine Few (A) None Seen /LPF

## 2022-08-26 LAB
BACTERIA UR CULT: ABNORMAL
BACTERIA UR CULT: ABNORMAL

## 2022-10-15 ENCOUNTER — HEALTH MAINTENANCE LETTER (OUTPATIENT)
Age: 77
End: 2022-10-15

## 2022-12-23 DIAGNOSIS — E78.5 HYPERLIPIDEMIA, UNSPECIFIED: ICD-10-CM

## 2022-12-23 RX ORDER — SIMVASTATIN 40 MG
TABLET ORAL
Qty: 90 TABLET | Refills: 2 | Status: SHIPPED | OUTPATIENT
Start: 2022-12-23 | End: 2023-05-24

## 2022-12-23 NOTE — TELEPHONE ENCOUNTER
"Last Written Prescription Date:  12/20/21  Last Fill Quantity: 90,  # refills: 3   Last office visit provider:  8/23/22     Requested Prescriptions   Pending Prescriptions Disp Refills     simvastatin (ZOCOR) 40 MG tablet [Pharmacy Med Name: SIMVASTATIN 40 MG TABLET] 90 tablet 2     Sig: TAKE 1 TABLET BY MOUTH EVERYDAY AT BEDTIME       Statins Protocol Passed - 12/23/2022 12:11 AM        Passed - LDL on file in past 12 months     Recent Labs   Lab Test 05/02/22  1122   LDL 96             Passed - No abnormal creatine kinase in past 12 months     No lab results found.             Passed - Recent (12 mo) or future (30 days) visit within the authorizing provider's specialty     Patient has had an office visit with the authorizing provider or a provider within the authorizing providers department within the previous 12 mos or has a future within next 30 days. See \"Patient Info\" tab in inbasket, or \"Choose Columns\" in Meds & Orders section of the refill encounter.              Passed - Medication is active on med list        Passed - Patient is age 18 or older        Passed - No active pregnancy on record        Passed - No positive pregnancy test in past 12 months             Charis Arriaga RN 12/23/22 4:03 PM  "

## 2022-12-27 DIAGNOSIS — J30.1 SEASONAL ALLERGIC RHINITIS DUE TO POLLEN: ICD-10-CM

## 2022-12-27 RX ORDER — FLUTICASONE PROPIONATE 50 MCG
SPRAY, SUSPENSION (ML) NASAL
Qty: 48 ML | Refills: 1 | Status: SHIPPED | OUTPATIENT
Start: 2022-12-27 | End: 2023-05-24

## 2022-12-28 NOTE — TELEPHONE ENCOUNTER
"Last Written Prescription Date:  6/9/22  Last Fill Quantity: 48 mL ,  # refills: 1   Last office visit provider:  8/23/22     Requested Prescriptions   Pending Prescriptions Disp Refills     fluticasone (FLONASE) 50 MCG/ACT nasal spray [Pharmacy Med Name: FLUTICASONE PROP 50 MCG SPRAY] 48 mL 1     Sig: SPRAY 2 SPRAYS INTO EACH NOSTRIL EVERY DAY       Nasal Allergy Protocol Passed - 12/27/2022 12:10 AM        Passed - Patient is age 12 or older        Passed - Recent (12 mo) or future (30 days) visit within the authorizing provider's specialty     Patient has had an office visit with the authorizing provider or a provider within the authorizing providers department within the previous 12 mos or has a future within next 30 days. See \"Patient Info\" tab in inbasket, or \"Choose Columns\" in Meds & Orders section of the refill encounter.              Passed - Medication is active on med list             Bonny Germain RN 12/27/22 9:40 PM  "

## 2023-02-20 DIAGNOSIS — Z76.0 ENCOUNTER FOR MEDICATION REFILL: ICD-10-CM

## 2023-02-20 DIAGNOSIS — E03.9 HYPOTHYROIDISM, UNSPECIFIED TYPE: ICD-10-CM

## 2023-02-20 DIAGNOSIS — E53.8 VITAMIN B12 DEFICIENCY: ICD-10-CM

## 2023-02-20 RX ORDER — CYANOCOBALAMIN 1000 UG/ML
INJECTION, SOLUTION INTRAMUSCULAR; SUBCUTANEOUS
Qty: 6 ML | Refills: 5 | Status: SHIPPED | OUTPATIENT
Start: 2023-02-20 | End: 2024-03-04

## 2023-02-20 NOTE — TELEPHONE ENCOUNTER
"Last Written Prescription Date:  12/17/2021  Last Fill Quantity: 6 ml,  # refills: 5   Last office visit provider:  8/23/2022     Requested Prescriptions   Pending Prescriptions Disp Refills     cyanocobalamin (CYANOCOBALAMIN) 1000 MCG/ML injection [Pharmacy Med Name: CYANOCOBALAMIN 1,000 MCG/ML VL] 6 mL 2     Sig: INJECT 1 ML INTO THE THIGH, SHOULDER, OR BUTTOCKS EVERY 14 DAYS       Vitamin Supplements (Adult) Protocol Passed - 2/20/2023 11:44 AM        Passed - High dose Vitamin D not ordered        Passed - Recent (12 mo) or future (30 days) visit within the authorizing provider's specialty     Patient has had an office visit with the authorizing provider or a provider within the authorizing providers department within the previous 12 mos or has a future within next 30 days. See \"Patient Info\" tab in inbasket, or \"Choose Columns\" in Meds & Orders section of the refill encounter.              Passed - Medication is active on med list       Routing refill request to provider for review/approval because:  A break in medication    Last Written Prescription Date:  3/14/2022  Last Fill Quantity: 90,  # refills: 1   Last office visit provider:  8/23/2022    Requested Prescriptions   Pending Prescriptions Disp Refills     levothyroxine (SYNTHROID/LEVOTHROID) 75 MCG tablet [Pharmacy Med Name: LEVOTHYROXINE 75 MCG TABLET] 90 tablet 1     Sig: TAKE 1 TABLET BY MOUTH DAILY       Thyroid Protocol Passed - 2/20/2023 11:44 AM        Passed - Patient is 12 years or older        Passed - Recent (12 mo) or future (30 days) visit within the authorizing provider's specialty     Patient has had an office visit with the authorizing provider or a provider within the authorizing providers department within the previous 12 mos or has a future within next 30 days. See \"Patient Info\" tab in inbasket, or \"Choose Columns\" in Meds & Orders section of the refill encounter.              Passed - Medication is active on med list        Passed - " "Normal TSH on file in past 12 months     Recent Labs   Lab Test 05/02/22  1122   TSH 0.89              Passed - No active pregnancy on record     If patient is pregnant or has had a positive pregnancy test, please check TSH.          Passed - No positive pregnancy test in past 12 months     If patient is pregnant or has had a positive pregnancy test, please check TSH.           Signed Prescriptions Disp Refills    cyanocobalamin (CYANOCOBALAMIN) 1000 MCG/ML injection 6 mL 5     Sig: INJECT 1 ML INTO THE THIGH, SHOULDER, OR BUTTOCKS EVERY 14 DAYS       Vitamin Supplements (Adult) Protocol Passed - 2/20/2023 11:44 AM        Passed - High dose Vitamin D not ordered        Passed - Recent (12 mo) or future (30 days) visit within the authorizing provider's specialty     Patient has had an office visit with the authorizing provider or a provider within the authorizing providers department within the previous 12 mos or has a future within next 30 days. See \"Patient Info\" tab in inbasket, or \"Choose Columns\" in Meds & Orders section of the refill encounter.              Passed - Medication is active on med list             Cammy Nicole RN 02/20/23 4:39 PM      "

## 2023-02-21 RX ORDER — LEVOTHYROXINE SODIUM 75 UG/1
TABLET ORAL
Qty: 90 TABLET | Refills: 0 | Status: SHIPPED | OUTPATIENT
Start: 2023-02-21 | End: 2023-05-24

## 2023-04-20 ENCOUNTER — PATIENT OUTREACH (OUTPATIENT)
Dept: CARE COORDINATION | Facility: CLINIC | Age: 78
End: 2023-04-20
Payer: MEDICARE

## 2023-05-24 ENCOUNTER — OFFICE VISIT (OUTPATIENT)
Dept: PEDIATRICS | Facility: CLINIC | Age: 78
End: 2023-05-24
Payer: MEDICARE

## 2023-05-24 VITALS
HEIGHT: 60 IN | SYSTOLIC BLOOD PRESSURE: 128 MMHG | TEMPERATURE: 97.5 F | BODY MASS INDEX: 26.7 KG/M2 | HEART RATE: 58 BPM | DIASTOLIC BLOOD PRESSURE: 82 MMHG | OXYGEN SATURATION: 96 % | WEIGHT: 136 LBS

## 2023-05-24 DIAGNOSIS — Z00.00 ENCOUNTER FOR MEDICARE ANNUAL WELLNESS EXAM: Primary | ICD-10-CM

## 2023-05-24 DIAGNOSIS — M25.472 SWELLING OF BOTH ANKLES: ICD-10-CM

## 2023-05-24 DIAGNOSIS — J30.1 SEASONAL ALLERGIC RHINITIS DUE TO POLLEN: ICD-10-CM

## 2023-05-24 DIAGNOSIS — M25.471 SWELLING OF BOTH ANKLES: ICD-10-CM

## 2023-05-24 DIAGNOSIS — N39.41 URGE INCONTINENCE OF URINE: ICD-10-CM

## 2023-05-24 DIAGNOSIS — E78.5 HYPERLIPIDEMIA, UNSPECIFIED HYPERLIPIDEMIA TYPE: ICD-10-CM

## 2023-05-24 DIAGNOSIS — E78.2 MIXED HYPERLIPIDEMIA: ICD-10-CM

## 2023-05-24 DIAGNOSIS — Z76.0 ENCOUNTER FOR MEDICATION REFILL: ICD-10-CM

## 2023-05-24 DIAGNOSIS — E04.2 NON-TOXIC MULTINODULAR GOITER: ICD-10-CM

## 2023-05-24 DIAGNOSIS — I10 ESSENTIAL HYPERTENSION, BENIGN: ICD-10-CM

## 2023-05-24 DIAGNOSIS — E03.9 HYPOTHYROIDISM, UNSPECIFIED TYPE: ICD-10-CM

## 2023-05-24 DIAGNOSIS — E61.1 IRON DEFICIENCY: ICD-10-CM

## 2023-05-24 LAB
ALBUMIN SERPL BCG-MCNC: 3.8 G/DL (ref 3.5–5.2)
ALP SERPL-CCNC: 64 U/L (ref 35–104)
ALT SERPL W P-5'-P-CCNC: 10 U/L (ref 10–35)
ANION GAP SERPL CALCULATED.3IONS-SCNC: 13 MMOL/L (ref 7–15)
AST SERPL W P-5'-P-CCNC: 25 U/L (ref 10–35)
BILIRUB SERPL-MCNC: 0.3 MG/DL
BUN SERPL-MCNC: 15.2 MG/DL (ref 8–23)
CALCIUM SERPL-MCNC: 9.2 MG/DL (ref 8.8–10.2)
CHLORIDE SERPL-SCNC: 105 MMOL/L (ref 98–107)
CHOLEST SERPL-MCNC: 125 MG/DL
CREAT SERPL-MCNC: 0.51 MG/DL (ref 0.51–0.95)
DEPRECATED HCO3 PLAS-SCNC: 23 MMOL/L (ref 22–29)
GFR SERPL CREATININE-BSD FRML MDRD: >90 ML/MIN/1.73M2
GLUCOSE SERPL-MCNC: 89 MG/DL (ref 70–99)
HDLC SERPL-MCNC: 52 MG/DL
LDLC SERPL CALC-MCNC: 65 MG/DL
NONHDLC SERPL-MCNC: 73 MG/DL
POTASSIUM SERPL-SCNC: 4.1 MMOL/L (ref 3.4–5.3)
PROT SERPL-MCNC: 6.5 G/DL (ref 6.4–8.3)
SODIUM SERPL-SCNC: 141 MMOL/L (ref 136–145)
TRIGL SERPL-MCNC: 40 MG/DL
TSH SERPL DL<=0.005 MIU/L-ACNC: 0.37 UIU/ML (ref 0.3–4.2)

## 2023-05-24 PROCEDURE — 80053 COMPREHEN METABOLIC PANEL: CPT | Performed by: PHYSICIAN ASSISTANT

## 2023-05-24 PROCEDURE — G0439 PPPS, SUBSEQ VISIT: HCPCS | Performed by: PHYSICIAN ASSISTANT

## 2023-05-24 PROCEDURE — 84443 ASSAY THYROID STIM HORMONE: CPT | Performed by: PHYSICIAN ASSISTANT

## 2023-05-24 PROCEDURE — 80061 LIPID PANEL: CPT | Performed by: PHYSICIAN ASSISTANT

## 2023-05-24 PROCEDURE — 36415 COLL VENOUS BLD VENIPUNCTURE: CPT | Performed by: PHYSICIAN ASSISTANT

## 2023-05-24 RX ORDER — OLOPATADINE HYDROCHLORIDE 2 MG/ML
1 SOLUTION/ DROPS OPHTHALMIC
Status: CANCELLED | OUTPATIENT
Start: 2023-05-24

## 2023-05-24 RX ORDER — MOMETASONE FUROATE MONOHYDRATE 50 UG/1
SPRAY, METERED NASAL
Qty: 17 G | Status: CANCELLED | OUTPATIENT
Start: 2023-05-24

## 2023-05-24 RX ORDER — LEVOTHYROXINE SODIUM 75 UG/1
75 TABLET ORAL DAILY
Qty: 90 TABLET | Refills: 3 | Status: SHIPPED | OUTPATIENT
Start: 2023-05-24 | End: 2023-10-10

## 2023-05-24 RX ORDER — FLUTICASONE PROPIONATE 50 MCG
2 SPRAY, SUSPENSION (ML) NASAL DAILY
Qty: 48 ML | Refills: 11 | Status: SHIPPED | OUTPATIENT
Start: 2023-05-24 | End: 2024-06-03

## 2023-05-24 RX ORDER — METOPROLOL SUCCINATE 200 MG/1
200 TABLET, EXTENDED RELEASE ORAL DAILY
Qty: 90 TABLET | Refills: 4 | Status: SHIPPED | OUTPATIENT
Start: 2023-05-24 | End: 2024-06-13

## 2023-05-24 RX ORDER — TROSPIUM CHLORIDE ER 60 MG/1
60 CAPSULE ORAL
Qty: 90 CAPSULE | Refills: 4 | Status: SHIPPED | OUTPATIENT
Start: 2023-05-24 | End: 2024-06-13

## 2023-05-24 RX ORDER — FERROUS SULFATE 325(65) MG
325 TABLET ORAL EVERY OTHER DAY
Qty: 100 TABLET | Refills: 0 | Status: CANCELLED | OUTPATIENT
Start: 2023-05-24

## 2023-05-24 RX ORDER — LISINOPRIL 40 MG/1
40 TABLET ORAL DAILY
Qty: 90 TABLET | Refills: 3 | Status: SHIPPED | OUTPATIENT
Start: 2023-05-24 | End: 2024-04-25

## 2023-05-24 RX ORDER — SIMVASTATIN 40 MG
40 TABLET ORAL AT BEDTIME
Qty: 90 TABLET | Refills: 3 | Status: SHIPPED | OUTPATIENT
Start: 2023-05-24 | End: 2024-04-25

## 2023-05-24 RX ORDER — FUROSEMIDE 40 MG
40 TABLET ORAL EVERY MORNING
Qty: 90 TABLET | Refills: 4 | Status: SHIPPED | OUTPATIENT
Start: 2023-05-24 | End: 2024-06-13

## 2023-05-24 SDOH — HEALTH STABILITY: PHYSICAL HEALTH: ON AVERAGE, HOW MANY DAYS PER WEEK DO YOU ENGAGE IN MODERATE TO STRENUOUS EXERCISE (LIKE A BRISK WALK)?: 0 DAYS

## 2023-05-24 SDOH — ECONOMIC STABILITY: INCOME INSECURITY: HOW HARD IS IT FOR YOU TO PAY FOR THE VERY BASICS LIKE FOOD, HOUSING, MEDICAL CARE, AND HEATING?: NOT HARD AT ALL

## 2023-05-24 SDOH — ECONOMIC STABILITY: TRANSPORTATION INSECURITY
IN THE PAST 12 MONTHS, HAS LACK OF TRANSPORTATION KEPT YOU FROM MEETINGS, WORK, OR FROM GETTING THINGS NEEDED FOR DAILY LIVING?: NO

## 2023-05-24 SDOH — ECONOMIC STABILITY: FOOD INSECURITY: WITHIN THE PAST 12 MONTHS, THE FOOD YOU BOUGHT JUST DIDN'T LAST AND YOU DIDN'T HAVE MONEY TO GET MORE.: NEVER TRUE

## 2023-05-24 SDOH — ECONOMIC STABILITY: INCOME INSECURITY: IN THE LAST 12 MONTHS, WAS THERE A TIME WHEN YOU WERE NOT ABLE TO PAY THE MORTGAGE OR RENT ON TIME?: NO

## 2023-05-24 SDOH — HEALTH STABILITY: PHYSICAL HEALTH: ON AVERAGE, HOW MANY MINUTES DO YOU ENGAGE IN EXERCISE AT THIS LEVEL?: 0 MIN

## 2023-05-24 SDOH — ECONOMIC STABILITY: FOOD INSECURITY: WITHIN THE PAST 12 MONTHS, YOU WORRIED THAT YOUR FOOD WOULD RUN OUT BEFORE YOU GOT MONEY TO BUY MORE.: NEVER TRUE

## 2023-05-24 SDOH — ECONOMIC STABILITY: TRANSPORTATION INSECURITY
IN THE PAST 12 MONTHS, HAS THE LACK OF TRANSPORTATION KEPT YOU FROM MEDICAL APPOINTMENTS OR FROM GETTING MEDICATIONS?: NO

## 2023-05-24 ASSESSMENT — ENCOUNTER SYMPTOMS
FREQUENCY: 0
ABDOMINAL PAIN: 0
DIARRHEA: 0
EYE PAIN: 0
CONSTIPATION: 0
JOINT SWELLING: 0
HEMATURIA: 0
WEAKNESS: 0
MYALGIAS: 0
PARESTHESIAS: 0
HEARTBURN: 0
BREAST MASS: 0
HEMATOCHEZIA: 0
HEADACHES: 0
PALPITATIONS: 0
FEVER: 0
DIZZINESS: 0
ARTHRALGIAS: 1
NAUSEA: 0
NERVOUS/ANXIOUS: 0
CHILLS: 0
DYSURIA: 0
COUGH: 0
SORE THROAT: 0
SHORTNESS OF BREATH: 0

## 2023-05-24 ASSESSMENT — LIFESTYLE VARIABLES
HOW OFTEN DO YOU HAVE A DRINK CONTAINING ALCOHOL: 2-4 TIMES A MONTH
AUDIT-C TOTAL SCORE: 2
SKIP TO QUESTIONS 9-10: 1
HOW OFTEN DO YOU HAVE SIX OR MORE DRINKS ON ONE OCCASION: NEVER
HOW MANY STANDARD DRINKS CONTAINING ALCOHOL DO YOU HAVE ON A TYPICAL DAY: 1 OR 2

## 2023-05-24 ASSESSMENT — SOCIAL DETERMINANTS OF HEALTH (SDOH)
ARE YOU MARRIED, WIDOWED, DIVORCED, SEPARATED, NEVER MARRIED, OR LIVING WITH A PARTNER?: NEVER MARRIED
HOW OFTEN DO YOU GET TOGETHER WITH FRIENDS OR RELATIVES?: THREE TIMES A WEEK
DO YOU BELONG TO ANY CLUBS OR ORGANIZATIONS SUCH AS CHURCH GROUPS UNIONS, FRATERNAL OR ATHLETIC GROUPS, OR SCHOOL GROUPS?: YES
HOW OFTEN DO YOU ATTEND CHURCH OR RELIGIOUS SERVICES?: NEVER
IN A TYPICAL WEEK, HOW MANY TIMES DO YOU TALK ON THE PHONE WITH FAMILY, FRIENDS, OR NEIGHBORS?: THREE TIMES A WEEK

## 2023-05-24 ASSESSMENT — PAIN SCALES - GENERAL: PAINLEVEL: NO PAIN (0)

## 2023-05-24 ASSESSMENT — ACTIVITIES OF DAILY LIVING (ADL): CURRENT_FUNCTION: NO ASSISTANCE NEEDED

## 2023-05-24 NOTE — PROGRESS NOTES
"SUBJECTIVE:   Claudia is a 78 year old who presents for Preventive Visit.      5/24/2023    10:03 AM   Additional Questions   Roomed by sena robledo ma   Accompanied by self   Patient has been advised of split billing requirements and indicates understanding: Yes  Are you in the first 12 months of your Medicare coverage?  No    Healthy Habits:     In general, how would you rate your overall health?  Good    Frequency of exercise:  None    Do you usually eat at least 4 servings of fruit and vegetables a day, include whole grains    & fiber and avoid regularly eating high fat or \"junk\" foods?  Yes    Taking medications regularly:  Yes    Medication side effects:  None    Ability to successfully perform activities of daily living:  No assistance needed    Home Safety:  No safety concerns identified    Hearing Impairment:  No hearing concerns    In the past 6 months, have you been bothered by leaking of urine?  No    In general, how would you rate your overall mental or emotional health?  Good      PHQ-2 Total Score: 0    Additional concerns today:  Yes        Have you ever done Advance Care Planning? (For example, a Health Directive, POLST, or a discussion with a medical provider or your loved ones about your wishes): Yes, advance care planning is on file.    Fall risk  Fallen 2 or more times in the past year?: No  Any fall with injury in the past year?: No  click delete button to remove this line now  Cognitive Screening   1) Repeat 3 items (Leader, Season, Table)    2) Clock draw: NORMAL  3) 3 item recall: Recalls 3 objects  Results: 3 items recalled: COGNITIVE IMPAIRMENT LESS LIKELY    Mini-CogTM Copyright EARNESTINE Soto. Licensed by the author for use in [facility]; reprinted with permission (amaila@.[facility] [place]). All rights reserved.      Do you have sleep apnea, excessive snoring or daytime drowsiness?: no    Reviewed and updated as needed this visit by clinical staff   Tobacco  Allergies  Meds              Reviewed " and updated as needed this visit by Provider                 Social History     Tobacco Use     Smoking status: Never     Passive exposure: Never     Smokeless tobacco: Never   Vaping Use     Vaping status: Never Used   Substance Use Topics     Alcohol use: Yes     Alcohol/week: 3.0 standard drinks of alcohol     Types: 3 Standard drinks or equivalent per week     Comment: Daily caffeine             5/24/2023    10:00 AM   Alcohol Use   Prescreen: >3 drinks/day or >7 drinks/week? No          View : No data to display.              Do you have a current opioid prescription? No  Do you use any other controlled substances or medications that are not prescribed by a provider? None              Current providers sharing in care for this patient include: Patient Care Team:  Katia Kenny MD as PCP - General (Family Practice)  Katia Kenny MD as Assigned PCP    The following health maintenance items are reviewed in Epic and correct as of today:  Health Maintenance   Topic Date Due     ANNUAL REVIEW OF HM ORDERS  09/24/2022     COVID-19 Vaccine (6 - Pfizer series) 01/21/2023     MEDICARE ANNUAL WELLNESS VISIT  05/02/2023     TSH W/FREE T4 REFLEX  05/02/2023     DTAP/TDAP/TD IMMUNIZATION (8 - Td or Tdap) 09/24/2023     FALL RISK ASSESSMENT  05/24/2024     COLORECTAL CANCER SCREENING  08/08/2025     LIPID  05/02/2027     ADVANCE CARE PLANNING  05/03/2027     DEXA  05/08/2034     HEPATITIS C SCREENING  Completed     PHQ-2 (once per calendar year)  Completed     INFLUENZA VACCINE  Completed     Pneumococcal Vaccine: 65+ Years  Completed     ZOSTER IMMUNIZATION  Completed     IPV IMMUNIZATION  Aged Out     MENINGITIS IMMUNIZATION  Aged Out     MAMMO SCREENING  Discontinued       1.  Has lost weight with diet, exercise. Joined [facility]  Needs letter to determine goal weight.  Eating more fruits and veggies.   Is not snacking as much.  Calorie per day 1500 per day.     Body mass index is 27.01 kg/m .    Wt Readings from Last 5  "Encounters:   05/24/23 61.7 kg (136 lb)   08/23/22 73.8 kg (162 lb 12.8 oz)   05/02/22 72.1 kg (159 lb)   09/24/21 77.6 kg (171 lb)   07/13/21 77.3 kg (170 lb 8 oz)       Lab work is in process        Pertinent mammograms are reviewed under the imaging tab.    Review of Systems   Constitutional: Negative for chills and fever.   HENT: Negative for congestion, ear pain, hearing loss and sore throat.    Eyes: Negative for pain and visual disturbance.   Respiratory: Negative for cough and shortness of breath.    Cardiovascular: Negative for chest pain, palpitations and peripheral edema.   Gastrointestinal: Negative for abdominal pain, constipation, diarrhea, heartburn, hematochezia and nausea.   Breasts:  Negative for tenderness, breast mass and discharge.   Genitourinary: Negative for dysuria, frequency, genital sores, hematuria, pelvic pain, urgency, vaginal bleeding and vaginal discharge.   Musculoskeletal: Positive for arthralgias. Negative for joint swelling and myalgias.   Skin: Negative for rash.   Neurological: Negative for dizziness, weakness, headaches and paresthesias.   Psychiatric/Behavioral: Negative for mood changes. The patient is not nervous/anxious.        OBJECTIVE:   /82   Pulse 58   Temp 97.5  F (36.4  C) (Tympanic)   Ht 1.511 m (4' 11.5\")   Wt 61.7 kg (136 lb)   SpO2 96%   BMI 27.01 kg/m   Estimated body mass index is 27.01 kg/m  as calculated from the following:    Height as of this encounter: 1.511 m (4' 11.5\").    Weight as of this encounter: 61.7 kg (136 lb).  Physical Exam  GENERAL: healthy, alert and no distress  EYES: Eyes grossly normal to inspection, PERRL and conjunctivae and sclerae normal  HENT: ear canals and TM's normal, nose and mouth without ulcers or lesions  NECK: no adenopathy, no asymmetry, masses, or scars and thyroid normal to palpation  RESP: lungs clear to auscultation - no rales, rhonchi or wheezes  CV: regular rate and rhythm, normal S1 S2, no S3 or S4, no " "murmur, click or rub, no peripheral edema and peripheral pulses strong  ABDOMEN: soft, nontender, no hepatosplenomegaly, no masses and bowel sounds normal  MS: no gross musculoskeletal defects noted, no edema  SKIN: no suspicious lesions or rashes  NEURO: Normal strength and tone, mentation intact and speech normal  PSYCH: mentation appears normal, affect normal/bright  LYMPH: no cervical, supraclavicular, axillary, or inguinal adenopathy    Diagnostic Test Results:  Labs reviewed in Epic    ASSESSMENT / PLAN:      Diagnosis Comments   1. Encounter for Medicare annual wellness exam  PRIMARY CARE FOLLOW-UP SCHEDULING         2. Urge incontinence of urine  trospium (SANCTURA XR) 60 MG CP24 24 hr capsule   refilled      3. Essential hypertension, benign  lisinopril (ZESTRIL) 40 MG tablet, Comprehensive metabolic panel (BMP + Alb, Alk Phos, ALT, AST, Total. Bili, TP)   Stable  Refilled x one year.       4. Encounter for medication refill  levothyroxine (SYNTHROID/LEVOTHROID) 75 MCG tablet   Refilled x one year       5. Hypothyroidism, unspecified type  levothyroxine (SYNTHROID/LEVOTHROID) 75 MCG tablet       6. Swelling of both ankles  furosemide (LASIX) 40 MG tablet         7. Seasonal allergic rhinitis due to pollen  fluticasone (FLONASE) 50 MCG/ACT nasal spray       8. Iron deficiency  No longer on iron, last cbc normal.       9. Non-toxic multinodular goiter  TSH WITH FREE T4 REFLEX       10. Hyperlipidemia, unspecified hyperlipidemia type  simvastatin (ZOCOR) 40 MG tablet       11. Mixed hyperlipidemia  Lipid panel reflex to direct LDL Fasting               COUNSELING:  Reviewed preventive health counseling, as reflected in patient instructions      BMI:   Estimated body mass index is 27.01 kg/m  as calculated from the following:    Height as of this encounter: 1.511 m (4' 11.5\").    Weight as of this encounter: 61.7 kg (136 lb).   Weight management plan: Discussed healthy diet and exercise guidelines      She " reports that she has never smoked. She has never been exposed to tobacco smoke. She has never used smokeless tobacco.      Appropriate preventive services were discussed with this patient, including applicable screening as appropriate for cardiovascular disease, diabetes, osteopenia/osteoporosis, and glaucoma.  As appropriate for age/gender, discussed screening for colorectal cancer, prostate cancer, breast cancer, and cervical cancer. Checklist reviewing preventive services available has been given to the patient.    Reviewed patients plan of care and provided an AVS. The Basic Care Plan (routine screening as documented in Health Maintenance) for Claudia meets the Care Plan requirement. This Care Plan has been established and reviewed with the Patient.          Namrata Corrigan PA-C  [facility]    Identified Health Risks:    I have reviewed Opioid Use Disorder and Substance Use Disorder risk factors and made any needed referrals.

## 2023-05-24 NOTE — PATIENT INSTRUCTIONS
Patient Education   Personalized Prevention Plan  You are due for the preventive services outlined below.  Your care team is available to assist you in scheduling these services.  If you have already completed any of these items, please share that information with your care team to update in your medical record.  Health Maintenance Due   Topic Date Due     ANNUAL REVIEW OF HM ORDERS  09/24/2022     COVID-19 Vaccine (6 - Pfizer series) 01/21/2023     Thyroid Function Lab  05/02/2023       Exercise for a Healthier Heart  You may wonder how you can improve the health of your heart. If you re thinking about exercise, you re on the right track. You don t need to become an athlete. But you do need a certain amount of brisk exercise to help strengthen your heart. If you have been diagnosed with a heart condition, your healthcare provider may advise exercise to help your condition. To help make exercise a habit, choose safe, fun activities.      Exercise with a friend. When activity is fun, you're more likely to stick with it.     Before you start  Check with your healthcare provider before starting an exercise program. This is especially important if you haven't been active for a while. It's also important if you have a long-term (chronic) health problem such as heart disease, diabetes, or obesity. Also check with your provider if you're at high risk for having these problems.   Why exercise?  Exercising regularly offers many healthy rewards. It can help you do all of these:     Improve your blood cholesterol level to help prevent further heart trouble.    Lower your blood pressure to help prevent a stroke or heart attack.    Control diabetes or reduce your risk of getting this disease.    Improve your heart and lung function.    Reach and stay at a healthy weight.    Make your muscles stronger so you can stay active.    Prevent falls and fractures by slowing the loss of bone mass (osteoporosis).    Manage stress  better.    Improve your sense of self and your body image.  Exercise tips      Ease into your routine. Set small goals. Then build on them. Talk with your healthcare provider first before starting an exercise routine if you're not sure what your activity level should be.    Exercise on most days. Aim for a total of at least 150 minutes (2 hours and 30 minutes) or more of moderate-intensity aerobic activity each week. You could also do 75 minutes (1 hour and 15 minutes) or more of vigorous-intensity aerobic activity each week. Or try for a combination of both. Moderate activity means that you breathe heavier and your heart rate increases, but you can still talk. Think about doing at least 30 minutes of moderate exercise, 5 times a week. It's OK to work up to the 30-minute period over time. Examples of moderate-intensity activity are brisk walking, gardening, and water aerobics.    Step up your daily activity level.  Along with your exercise program, try being more active the whole day. Walk instead of drive. Or park further away so that you take more steps each day. Do more household tasks or yard work. You may not be able to meet the advised amount of physical activity. But doing some moderate- or vigorous-intensity aerobic activity can help reduce your risk for heart disease. Your healthcare provider can help you figure out what is best for you.    Choose 1 or more activities you enjoy.  Walking is one of the easiest things you can do. You can also try swimming, riding a bike, dancing, or taking an exercise class.    Call 911  Call 911 right away if any of these occur:     Chest pain that doesn't go away quickly with rest    New burning, tightness, pressure, or heaviness in your chest, neck, shoulders, back, or arms    Abnormal or severe shortness of breath    A very fast or irregular heartbeat (palpitations)    Fainting  When to call your healthcare provider  Call your healthcare provider if you have any of these:      Dizziness or lightheadedness    Mild shortness of breath or chest pain    Increased or new joint or muscle pain    Eulalia last reviewed this educational content on 7/1/2022 2000-2022 [employer]. All rights reserved. This information is not intended as a substitute for professional medical care. Always follow your healthcare professional's instructions.

## 2023-05-24 NOTE — LETTER
To whom it may concern,    Claudia SILVIANO Powers is a patient of [facility]. She is currently working on diet and exercise. Her goal weight is 135#.     Namrata Corrigan PA-C on 5/24/2023 at 10:32 AM

## 2023-07-07 ENCOUNTER — MYC MEDICAL ADVICE (OUTPATIENT)
Dept: FAMILY MEDICINE | Facility: CLINIC | Age: 78
End: 2023-07-07
Payer: MEDICARE

## 2023-07-31 DIAGNOSIS — E03.9 HYPOTHYROIDISM, UNSPECIFIED TYPE: ICD-10-CM

## 2023-07-31 DIAGNOSIS — E55.9 VITAMIN D DEFICIENCY: ICD-10-CM

## 2023-07-31 DIAGNOSIS — Z76.0 ENCOUNTER FOR MEDICATION REFILL: ICD-10-CM

## 2023-08-01 RX ORDER — ERGOCALCIFEROL 1.25 MG/1
CAPSULE, LIQUID FILLED ORAL
Qty: 24 CAPSULE | Refills: 3 | Status: SHIPPED | OUTPATIENT
Start: 2023-08-01 | End: 2024-08-19

## 2023-08-02 RX ORDER — LEVOTHYROXINE SODIUM 75 UG/1
TABLET ORAL
Qty: 90 TABLET | Refills: 3 | OUTPATIENT
Start: 2023-08-02

## 2023-08-14 DIAGNOSIS — E03.9 HYPOTHYROIDISM, UNSPECIFIED TYPE: ICD-10-CM

## 2023-08-14 DIAGNOSIS — Z76.0 ENCOUNTER FOR MEDICATION REFILL: ICD-10-CM

## 2023-08-16 RX ORDER — LEVOTHYROXINE SODIUM 75 UG/1
75 TABLET ORAL DAILY
Qty: 90 TABLET | Refills: 3 | OUTPATIENT
Start: 2023-08-16

## 2024-03-03 DIAGNOSIS — E53.8 VITAMIN B12 DEFICIENCY: ICD-10-CM

## 2024-03-04 RX ORDER — CYANOCOBALAMIN 1000 UG/ML
INJECTION, SOLUTION INTRAMUSCULAR; SUBCUTANEOUS
Qty: 6 ML | Refills: 0 | Status: SHIPPED | OUTPATIENT
Start: 2024-03-04 | End: 2024-06-10

## 2024-04-01 DIAGNOSIS — E53.8 VITAMIN B12 DEFICIENCY: ICD-10-CM

## 2024-04-03 RX ORDER — CYANOCOBALAMIN 1000 UG/ML
INJECTION, SOLUTION INTRAMUSCULAR; SUBCUTANEOUS
Qty: 6 ML | Refills: 0 | OUTPATIENT
Start: 2024-04-03

## 2024-04-25 DIAGNOSIS — I10 ESSENTIAL HYPERTENSION, BENIGN: ICD-10-CM

## 2024-04-25 DIAGNOSIS — E03.9 HYPOTHYROIDISM, UNSPECIFIED TYPE: ICD-10-CM

## 2024-04-25 DIAGNOSIS — E78.5 HYPERLIPIDEMIA, UNSPECIFIED HYPERLIPIDEMIA TYPE: ICD-10-CM

## 2024-04-25 DIAGNOSIS — Z76.0 ENCOUNTER FOR MEDICATION REFILL: ICD-10-CM

## 2024-04-25 RX ORDER — LISINOPRIL 40 MG/1
40 TABLET ORAL DAILY
Qty: 90 TABLET | Refills: 0 | Status: SHIPPED | OUTPATIENT
Start: 2024-04-25 | End: 2024-06-28

## 2024-04-25 RX ORDER — LEVOTHYROXINE SODIUM 75 UG/1
75 TABLET ORAL DAILY
Qty: 90 TABLET | Refills: 2 | OUTPATIENT
Start: 2024-04-25

## 2024-04-25 RX ORDER — SIMVASTATIN 40 MG
40 TABLET ORAL AT BEDTIME
Qty: 90 TABLET | Refills: 0 | Status: SHIPPED | OUTPATIENT
Start: 2024-04-25 | End: 2024-06-28

## 2024-06-01 DIAGNOSIS — J30.1 SEASONAL ALLERGIC RHINITIS DUE TO POLLEN: ICD-10-CM

## 2024-06-03 RX ORDER — FLUTICASONE PROPIONATE 50 MCG
2 SPRAY, SUSPENSION (ML) NASAL DAILY
Qty: 48 ML | Refills: 0 | Status: SHIPPED | OUTPATIENT
Start: 2024-06-03

## 2024-06-03 NOTE — TELEPHONE ENCOUNTER
Medication is being filled for 1 time refill only due to:  Patient needs to be seen because it has been more than one year since last visit.  Laura Solis RN, BSN  [facility]

## 2024-06-10 DIAGNOSIS — M25.472 SWELLING OF BOTH ANKLES: ICD-10-CM

## 2024-06-10 DIAGNOSIS — N39.41 URGE INCONTINENCE OF URINE: ICD-10-CM

## 2024-06-10 DIAGNOSIS — M25.471 SWELLING OF BOTH ANKLES: ICD-10-CM

## 2024-06-10 DIAGNOSIS — I10 ESSENTIAL HYPERTENSION: Primary | ICD-10-CM

## 2024-06-10 DIAGNOSIS — E03.9 HYPOTHYROIDISM, UNSPECIFIED TYPE: ICD-10-CM

## 2024-06-10 DIAGNOSIS — E53.8 VITAMIN B12 DEFICIENCY: ICD-10-CM

## 2024-06-10 DIAGNOSIS — Z76.0 ENCOUNTER FOR MEDICATION REFILL: ICD-10-CM

## 2024-06-10 DIAGNOSIS — J30.1 SEASONAL ALLERGIC RHINITIS DUE TO POLLEN: ICD-10-CM

## 2024-06-10 RX ORDER — FLUTICASONE PROPIONATE 50 MCG
2 SPRAY, SUSPENSION (ML) NASAL DAILY
Qty: 48 ML | Refills: 0 | OUTPATIENT
Start: 2024-06-10

## 2024-06-10 RX ORDER — CYANOCOBALAMIN 1000 UG/ML
INJECTION, SOLUTION INTRAMUSCULAR; SUBCUTANEOUS
Qty: 6 ML | Refills: 0 | Status: SHIPPED | OUTPATIENT
Start: 2024-06-10 | End: 2024-08-19

## 2024-06-13 RX ORDER — LEVOTHYROXINE SODIUM 75 UG/1
75 TABLET ORAL DAILY
Qty: 90 TABLET | Refills: 2 | Status: SHIPPED | OUTPATIENT
Start: 2024-06-13

## 2024-06-13 RX ORDER — TROSPIUM CHLORIDE ER 60 MG/1
60 CAPSULE ORAL
Qty: 90 CAPSULE | Refills: 4 | Status: SHIPPED | OUTPATIENT
Start: 2024-06-13

## 2024-06-13 RX ORDER — FUROSEMIDE 40 MG
40 TABLET ORAL EVERY MORNING
Qty: 90 TABLET | Refills: 1 | Status: SHIPPED | OUTPATIENT
Start: 2024-06-13

## 2024-06-13 RX ORDER — METOPROLOL SUCCINATE 200 MG/1
200 TABLET, EXTENDED RELEASE ORAL DAILY
Qty: 90 TABLET | Refills: 4 | Status: SHIPPED | OUTPATIENT
Start: 2024-06-13

## 2024-06-13 NOTE — TELEPHONE ENCOUNTER
Future Appointments   Date Time Provider Department Center   6/24/2024  9:20 AM Katia Kenny MD [facility]   6/26/2025  9:20 AM Katia Kenny MD [facility]      Health Maintenance Due   Topic Date Due    RSV VACCINE (Pregnancy & 60+) (1 - 1-dose 60+ series) Never done    COVID-19 Vaccine (6 - 2023-24 season) 09/01/2023    DTAP/TDAP/TD IMMUNIZATION (8 - Td or Tdap) 09/24/2023    PHQ-2 (once per calendar year)  01/01/2024    LIPID  05/24/2024    ANNUAL REVIEW OF HM ORDERS  05/24/2024    FALL RISK ASSESSMENT  05/24/2024    MEDICARE ANNUAL WELLNESS VISIT  05/24/2024    TSH W/FREE T4 REFLEX  05/24/2024     BP Readings from Last 3 Encounters:   05/24/23 128/82   08/23/22 132/74   06/22/22 (!) 141/79     Claudia was seen today for medication refill.    Diagnoses and all orders for this visit:    Essential hypertension  -     metoprolol succinate ER (TOPROL XL) 200 MG 24 hr tablet; TAKE 1 TABLET BY MOUTH EVERY DAY    Seasonal allergic rhinitis due to pollen    Swelling of both ankles  -     furosemide (LASIX) 40 MG tablet; TAKE 1 TABLET BY MOUTH EVERY MORNING.    Urge incontinence of urine  -     trospium (SANCTURA XR) 60 MG CP24 24 hr capsule; TAKE 1 CAPSULE (60 MG) BY MOUTH EVERY MORNING (BEFORE BREAKFAST)    Encounter for medication refill  -     levothyroxine (SYNTHROID/LEVOTHROID) 75 MCG tablet; TAKE 1 TABLET BY MOUTH EVERY DAY    Hypothyroidism, unspecified type  -     levothyroxine (SYNTHROID/LEVOTHROID) 75 MCG tablet; TAKE 1 TABLET BY MOUTH EVERY DAY

## 2024-06-19 SDOH — HEALTH STABILITY: PHYSICAL HEALTH: ON AVERAGE, HOW MANY MINUTES DO YOU ENGAGE IN EXERCISE AT THIS LEVEL?: 0 MIN

## 2024-06-19 SDOH — HEALTH STABILITY: PHYSICAL HEALTH: ON AVERAGE, HOW MANY DAYS PER WEEK DO YOU ENGAGE IN MODERATE TO STRENUOUS EXERCISE (LIKE A BRISK WALK)?: 0 DAYS

## 2024-06-19 ASSESSMENT — SOCIAL DETERMINANTS OF HEALTH (SDOH): HOW OFTEN DO YOU GET TOGETHER WITH FRIENDS OR RELATIVES?: THREE TIMES A WEEK

## 2024-06-24 ENCOUNTER — OFFICE VISIT (OUTPATIENT)
Dept: FAMILY MEDICINE | Facility: CLINIC | Age: 79
End: 2024-06-24
Payer: MEDICARE

## 2024-06-24 VITALS
HEART RATE: 56 BPM | WEIGHT: 135 LBS | DIASTOLIC BLOOD PRESSURE: 76 MMHG | BODY MASS INDEX: 27.21 KG/M2 | SYSTOLIC BLOOD PRESSURE: 135 MMHG | OXYGEN SATURATION: 99 % | TEMPERATURE: 97.2 F | RESPIRATION RATE: 14 BRPM | HEIGHT: 59 IN

## 2024-06-24 DIAGNOSIS — E03.9 ACQUIRED HYPOTHYROIDISM: ICD-10-CM

## 2024-06-24 DIAGNOSIS — Z12.31 VISIT FOR SCREENING MAMMOGRAM: ICD-10-CM

## 2024-06-24 DIAGNOSIS — Z00.00 ENCOUNTER FOR MEDICARE ANNUAL WELLNESS EXAM: Primary | ICD-10-CM

## 2024-06-24 DIAGNOSIS — E04.2 NON-TOXIC MULTINODULAR GOITER: ICD-10-CM

## 2024-06-24 DIAGNOSIS — Z29.11 NEED FOR VACCINATION AGAINST RESPIRATORY SYNCYTIAL VIRUS: ICD-10-CM

## 2024-06-24 DIAGNOSIS — Z23 NEED FOR TDAP VACCINATION: ICD-10-CM

## 2024-06-24 DIAGNOSIS — I10 ESSENTIAL HYPERTENSION: ICD-10-CM

## 2024-06-24 DIAGNOSIS — E78.2 MIXED HYPERLIPIDEMIA: ICD-10-CM

## 2024-06-24 LAB
ALBUMIN SERPL BCG-MCNC: 4 G/DL (ref 3.5–5.2)
ALP SERPL-CCNC: 68 U/L (ref 40–150)
ALT SERPL W P-5'-P-CCNC: 23 U/L (ref 0–50)
ANION GAP SERPL CALCULATED.3IONS-SCNC: 8 MMOL/L (ref 7–15)
AST SERPL W P-5'-P-CCNC: 35 U/L (ref 0–45)
BILIRUB SERPL-MCNC: 0.4 MG/DL
BUN SERPL-MCNC: 12.8 MG/DL (ref 8–23)
CALCIUM SERPL-MCNC: 9.2 MG/DL (ref 8.8–10.2)
CHLORIDE SERPL-SCNC: 104 MMOL/L (ref 98–107)
CHOLEST SERPL-MCNC: 149 MG/DL
CREAT SERPL-MCNC: 0.59 MG/DL (ref 0.51–0.95)
DEPRECATED HCO3 PLAS-SCNC: 28 MMOL/L (ref 22–29)
EGFRCR SERPLBLD CKD-EPI 2021: >90 ML/MIN/1.73M2
FASTING STATUS PATIENT QL REPORTED: YES
FASTING STATUS PATIENT QL REPORTED: YES
GLUCOSE SERPL-MCNC: 85 MG/DL (ref 70–99)
HDLC SERPL-MCNC: 66 MG/DL
LDLC SERPL CALC-MCNC: 75 MG/DL
NONHDLC SERPL-MCNC: 83 MG/DL
POTASSIUM SERPL-SCNC: 4.3 MMOL/L (ref 3.4–5.3)
PROT SERPL-MCNC: 7.1 G/DL (ref 6.4–8.3)
SODIUM SERPL-SCNC: 140 MMOL/L (ref 135–145)
TRIGL SERPL-MCNC: 41 MG/DL
TSH SERPL DL<=0.005 MIU/L-ACNC: 1.32 UIU/ML (ref 0.3–4.2)

## 2024-06-24 PROCEDURE — 84443 ASSAY THYROID STIM HORMONE: CPT | Performed by: FAMILY MEDICINE

## 2024-06-24 PROCEDURE — 80053 COMPREHEN METABOLIC PANEL: CPT | Performed by: FAMILY MEDICINE

## 2024-06-24 PROCEDURE — 36415 COLL VENOUS BLD VENIPUNCTURE: CPT | Performed by: FAMILY MEDICINE

## 2024-06-24 PROCEDURE — G0439 PPPS, SUBSEQ VISIT: HCPCS | Performed by: FAMILY MEDICINE

## 2024-06-24 PROCEDURE — 80061 LIPID PANEL: CPT | Performed by: FAMILY MEDICINE

## 2024-06-24 RX ORDER — RESPIRATORY SYNCYTIAL VIRUS VACCINE 120MCG/0.5
0.5 KIT INTRAMUSCULAR ONCE
Qty: 1 EACH | Refills: 0 | Status: SHIPPED | OUTPATIENT
Start: 2024-06-24 | End: 2024-06-24

## 2024-06-24 NOTE — PATIENT INSTRUCTIONS
"At Pharmacy:  RSV and Tdap    Patient Education   Preventive Care Advice   This is general advice we often give to help people stay healthy. Your care team may have specific advice just for you. Please talk to your care team about your own preventive care needs.  Lifestyle  Exercise at least 150 minutes each week (30 minutes a day, 5 days a week).  Do muscle strengthening activities 2 days a week. These help control your weight and prevent disease.  No smoking.  Wear sunscreen to prevent skin cancer.  Have your home tested for radon every 2 to 5 years. Radon is a colorless, odorless gas that can harm your lungs. To learn more, go to www.health.[facility].mn.us and search for \"Radon in Homes.\"  Keep guns unloaded and locked up in a safe place like a safe or gun vault, or, use a gun lock and hide the keys. Always lock away bullets separately. To learn more, visit [employer].[place].gov and search for \"safe gun storage.\"  Nutrition  Eat 5 or more servings of fruits and vegetables each day.  Try wheat bread, brown rice and whole grain pasta (instead of white bread, rice, and pasta).  Get enough calcium and vitamin D. Check the label on foods and aim for 100% of the RDA (recommended daily allowance).  Regular exams  Have a dental exam and cleaning every 6 months.  See your health care team every year to talk about:  Any changes in your health.  Any medicines your care team has prescribed.  Preventive care, family planning, and ways to prevent chronic diseases.  Shots (vaccines)   HPV shots (up to age 26), if you've never had them before.  Hepatitis B shots (up to age 59), if you've never had them before.  COVID-19 shot: Get this shot when it's due.  Flu shot: Get a flu shot every year.  Tetanus shot: Get a tetanus shot every 10 years.  Pneumococcal, hepatitis A, and RSV shots: Ask your care team if you need these based on your risk.  Shingles shot (for age 50 and up).  General health tests  Diabetes screening:  Starting at age 35, Get " screened for diabetes at least every 3 years.  If you are younger than age 35, ask your care team if you should be screened for diabetes.  Cholesterol test: At age 39, start having a cholesterol test every 5 years, or more often if advised.  Bone density scan (DEXA): At age 50, ask your care team if you should have this scan for osteoporosis (brittle bones).  Hepatitis C: Get tested at least once in your life.  Abdominal aortic aneurysm screening: Talk to your doctor about having this screening if you:  Have ever smoked; and  Are biologically male; and  Are between the ages of 65 and 75.  STIs (sexually transmitted infections)  Before age 24: Ask your care team if you should be screened for STIs.  After age 24: Get screened for STIs if you're at risk. You are at risk for STIs (including HIV) if:  You are sexually active with more than one person.  You don't use condoms every time.  You or a partner was diagnosed with a sexually transmitted infection.  If you are at risk for HIV, ask about PrEP medicine to prevent HIV.  Get tested for HIV at least once in your life, whether you are at risk for HIV or not.  Cancer screening tests  Cervical cancer screening: If you have a cervix, begin getting regular cervical cancer screening tests at age 21. Most people who have regular screenings with normal results can stop after age 65. Talk about this with your provider.  Breast cancer scan (mammogram): If you've ever had breasts, begin having regular mammograms starting at age 40. This is a scan to check for breast cancer.  Colon cancer screening: It is important to start screening for colon cancer at age 45.  Have a colonoscopy test every 10 years (or more often if you're at risk) Or, ask your provider about stool tests like a FIT test every year or Cologuard test every 3 years.  To learn more about your testing options, visit: www.[facility]/164150.pdf.  For help making a decision, visit: natalie/xx49761.  Prostate cancer  screening test: If you have a prostate and are age 55 to 69, ask your provider if you would benefit from a yearly prostate cancer screening test.  Lung cancer screening: If you are a current or former smoker age 50 to 80, ask your care team if ongoing lung cancer screenings are right for you.  For informational purposes only. Not to replace the advice of your health care provider. Copyright   2023 [facility]. All rights reserved. Clinically reviewed by the [facility]. Re5ult [id number] - REV 04/24.

## 2024-06-24 NOTE — PROGRESS NOTES
"Preventive Care Visit  [facility]  Katia Kenny MD, Family Medicine  Jun 24, 2024      Assessment & Plan     Encounter for Medicare annual wellness exam    Non-toxic multinodular goiter  - TSH WITH FREE T4 REFLEX; Future  - TSH WITH FREE T4 REFLEX    Need for Tdap vaccination  - Tdap, tetanus-diptheria-acell pertussis, (BOOSTRIX) 5-2.5-18.5 LF-MCG/0.5 OBDULIO injection; Inject 0.5 mLs into the muscle once for 1 dose    Need for vaccination against respiratory syncytial virus  - respiratory syncytial virus vaccine, bivalent (ABRYSVO) injection; Inject 0.5 mLs into the muscle once for 1 dose    Mixed hyperlipidemia  - Lipid panel reflex to direct LDL Non-fasting; Future  - Lipid panel reflex to direct LDL Non-fasting    Visit for screening mammogram  - MA Screening Bilateral w/ Shahab; Future    Essential hypertension  - Comprehensive metabolic panel (BMP + Alb, Alk Phos, ALT, AST, Total. Bili, TP); Future  - Comprehensive metabolic panel (BMP + Alb, Alk Phos, ALT, AST, Total. Bili, TP)    Acquired hypothyroidism  - TSH; Future        BMI  Estimated body mass index is 26.86 kg/m  as calculated from the following:    Height as of this encounter: 1.51 m (4' 11.45\").    Weight as of this encounter: 61.2 kg (135 lb).       Counseling  Appropriate preventive services were discussed with this patient, including applicable screening as appropriate for fall prevention, nutrition, physical activity, Tobacco-use cessation, weight loss and cognition.  Checklist reviewing preventive services available has been given to the patient.      Viridiana Taylor is a 79 year old, presenting for the following:  Wellness Visit        6/24/2024     9:20 AM   Additional Questions   Roomed by hser   Accompanied by self         Health Care Directive  Patient does not have a Health Care Directive or Living Will: Patient states has Advance Directive and will bring in a copy to clinic.    Has been eating better and lost " 35-40 lb. Goes to weekly meetings. Counting calories.   Allergic to exercise.   Feel ricardo.        6/19/2024   General Health   How would you rate your overall physical health? Good   Feel stress (tense, anxious, or unable to sleep) Only a little      (!) STRESS CONCERN      6/19/2024   Nutrition   Diet: Regular (no restrictions)            6/19/2024   Exercise   Days per week of moderate/strenous exercise 0 days   Average minutes spent exercising at this level 0 min      (!) EXERCISE CONCERN      6/19/2024   Social Factors   Frequency of gathering with friends or relatives Three times a week   Worry food won't last until get money to buy more No   Food not last or not have enough money for food? No   Do you have housing? (Housing is defined as stable permanent housing and does not include staying ouside in a car, in a tent, in an abandoned building, in an overnight shelter, or couch-surfing.) Yes   Are you worried about losing your housing? No   Lack of transportation? No   Unable to get utilities (heat,electricity)? No            6/19/2024   Fall Risk   Fallen 2 or more times in the past year? No    No   Trouble with walking or balance? No    No       Multiple values from one day are sorted in reverse-chronological order          6/19/2024   Activities of Daily Living- Home Safety   Needs help with the following daily activites None of the above   Safety concerns in the home None of the above            6/19/2024   Dental   Dentist two times every year? Yes            6/19/2024   Hearing Screening   Hearing concerns? None of the above            6/19/2024   Driving Risk Screening   Patient/family members have concerns about driving No            6/19/2024   General Alertness/Fatigue Screening   Have you been more tired than usual lately? No            6/19/2024   Urinary Incontinence Screening   Bothered by leaking urine in past 6 months No               Today's PHQ-2 Score:       6/23/2024     6:11 PM   PHQ-2  ( 1999 Pf[employer])   Q1: Little interest or pleasure in doing things 0   Q2: Feeling down, depressed or hopeless 0   PHQ-2 Score 0   Q1: Little interest or pleasure in doing things Not at all   Q2: Feeling down, depressed or hopeless Not at all   PHQ-2 Score 0           6/19/2024   Substance Use   Alcohol more than 3/day or more than 7/wk No   Do you have a current opioid prescription? No   How severe/bad is pain from 1 to 10? 1/10   Do you use any other substances recreationally? No        Social History     Tobacco Use    Smoking status: Never     Passive exposure: Never    Smokeless tobacco: Never   Vaping Use    Vaping status: Never Used   Substance Use Topics    Alcohol use: Yes     Alcohol/week: 3.0 standard drinks of alcohol     Types: 3 Standard drinks or equivalent per week     Comment: Daily caffeine    Drug use: Never           9/9/2021   LAST FHS-7 RESULTS   1st degree relative breast or ovarian cancer Yes   Any relative bilateral breast cancer No   Any male have breast cancer No   Any ONE woman have BOTH breast AND ovarian cancer No   Any woman with breast cancer before 50yrs Yes   2 or more relatives with breast AND/OR ovarian cancer Yes   2 or more relatives with breast AND/OR bowel cancer Yes             ASCVD Risk   The 10-year ASCVD risk score (Cuca PACK, et al., 2019) is: 28.5%    Values used to calculate the score:      Age: 79 years      Sex: Female      Is Non- : No      Diabetic: No      Tobacco smoker: No      Systolic Blood Pressure: 135 mmHg      Is BP treated: Yes      HDL Cholesterol: 66 mg/dL      Total Cholesterol: 149 mg/dL          Reviewed and updated as needed this visit by Provider                  Current providers sharing in care for this patient include:  Patient Care Team:  Katia Kenny MD as PCP - General (Family Practice)  Namrata Corrigan PA-C as Assigned PCP    The following health maintenance items are reviewed in Epic and correct as of  "today:  Health Maintenance   Topic Date Due    INFLUENZA VACCINE (1) 09/01/2024    COVID-19 Vaccine (7 - 2023-24 season) 10/24/2024    MEDICARE ANNUAL WELLNESS VISIT  06/24/2025    LIPID  06/24/2025    TSH W/FREE T4 REFLEX  06/24/2025    ANNUAL REVIEW OF HM ORDERS  06/24/2025    FALL RISK ASSESSMENT  06/24/2025    COLORECTAL CANCER SCREENING  08/08/2025    GLUCOSE  06/24/2027    ADVANCE CARE PLANNING  06/24/2029    DEXA  05/08/2034    DTAP/TDAP/TD IMMUNIZATION (9 - Td or Tdap) 06/24/2034    HEPATITIS C SCREENING  Completed    PHQ-2 (once per calendar year)  Completed    Pneumococcal Vaccine: 65+ Years  Completed    ZOSTER IMMUNIZATION  Completed    RSV VACCINE (Pregnancy & 60+)  Completed    IPV IMMUNIZATION  Aged Out    HPV IMMUNIZATION  Aged Out    MENINGITIS IMMUNIZATION  Aged Out    RSV MONOCLONAL ANTIBODY  Aged Out    MAMMO SCREENING  Discontinued        Objective    Exam  /76 (BP Location: Right arm, Patient Position: Sitting, Cuff Size: Adult Regular)   Pulse 56   Temp 97.2  F (36.2  C) (Temporal)   Resp 14   Ht 1.51 m (4' 11.45\")   Wt 61.2 kg (135 lb)   LMP  (LMP Unknown)   SpO2 99%   BMI 26.86 kg/m     Estimated body mass index is 26.86 kg/m  as calculated from the following:    Height as of this encounter: 1.51 m (4' 11.45\").    Weight as of this encounter: 61.2 kg (135 lb).    Physical Exam  GENERAL: alert and no distress  EYES: Eyes grossly normal to inspection, PERRL and conjunctivae and sclerae normal  HENT: ear canals and TM's normal, nose and mouth without ulcers or lesions  NECK: no adenopathy, no asymmetry, masses, or scars  RESP: lungs clear to auscultation - no rales, rhonchi or wheezes  BREAST: normal without masses, tenderness or nipple discharge and no palpable axillary masses or adenopathy  CV: regular rate and rhythm, normal S1 S2, no S3 or S4, no murmur, click or rub, no peripheral edema  ABDOMEN: soft, nontender, no hepatosplenomegaly, no masses and bowel sounds normal  MS: " no gross musculoskeletal defects noted, no edema  SKIN: no suspicious lesions or rashes  NEURO: Normal strength and tone, mentation intact and speech normal  PSYCH: mentation appears normal, affect normal/bright        6/24/2024   Mini Cog   Clock Draw Score 2 Normal   3 Item Recall 3 objects recalled   Mini Cog Total Score 5                 Signed Electronically by: Katia Kenny MD        Prior to immunization administration, verified patients identity using patient s name and date of birth. Please see Immunization Activity for additional information.     Screening Questionnaire for Adult Immunization    Are you sick today?   No   Do you have allergies to medications, food, a vaccine component or latex?   Yes   Have you ever had a serious reaction after receiving a vaccination?   No   Do you have a long-term health problem with heart, lung, kidney, or metabolic disease (e.g., diabetes), asthma, a blood disorder, no spleen, complement component deficiency, a cochlear implant, or a spinal fluid leak?  Are you on long-term aspirin therapy?   No   Do you have cancer, leukemia, HIV/AIDS, or any other immune system problem?   No   Do you have a parent, brother, or sister with an immune system problem?   No   In the past 3 months, have you taken medications that affect  your immune system, such as prednisone, other steroids, or anticancer drugs; drugs for the treatment of rheumatoid arthritis, Crohn s disease, or psoriasis; or have you had radiation treatments?   No   Have you had a seizure, or a brain or other nervous system problem?   No   During the past year, have you received a transfusion of blood or blood    products, or been given immune (gamma) globulin or antiviral drug?   No   For women: Are you pregnant or is there a chance you could become       pregnant during the next month?   No   Have you received any vaccinations in the past 4 weeks?   No     Immunization questionnaire was positive for at least one  answer.  Notified provider.      Patient instructed to remain in clinic for 15 minutes afterwards, and to report any adverse reactions.     Screening performed by Jeannette Peña MA on 6/24/2024 at 9:23 AM.

## 2024-06-28 DIAGNOSIS — E78.5 HYPERLIPIDEMIA, UNSPECIFIED HYPERLIPIDEMIA TYPE: ICD-10-CM

## 2024-06-28 DIAGNOSIS — I10 ESSENTIAL HYPERTENSION, BENIGN: ICD-10-CM

## 2024-06-28 RX ORDER — SIMVASTATIN 40 MG
40 TABLET ORAL AT BEDTIME
Qty: 90 TABLET | Refills: 0 | Status: SHIPPED | OUTPATIENT
Start: 2024-06-28

## 2024-06-28 RX ORDER — LISINOPRIL 40 MG/1
40 TABLET ORAL DAILY
Qty: 90 TABLET | Refills: 0 | Status: SHIPPED | OUTPATIENT
Start: 2024-06-28

## 2024-07-28 DIAGNOSIS — J30.89 ENVIRONMENTAL AND SEASONAL ALLERGIES: Primary | ICD-10-CM

## 2024-07-28 NOTE — TELEPHONE ENCOUNTER
Medication Question or Refill        What medication are you calling about (include dose and sig)?:  EPINEPHrine (EPIPEN) 0.3 MG/0.3ML injection  ( 2 boxes )    Preferred Pharmacy:       [facility]/pharmacy #[id number] - [place], [place] - [address] AT [place] [address] [address] [address]  Phone: [phone] Fax: [phone]          Controlled Substance Agreement on file:   CSA -- Patient Level:    CSA: None found at the patient level.       Who prescribed the medication?:  Katia Alfaro    Do you need a refill? Yes    When did you use the medication last?  Usually keeps them on hand incase she needs them.     Patient offered an appointment? No    Do you have any questions or concerns?  Yes:  Patient would a prescription for 2 boxes of the epipen.       Patient would like a call to confirm that prescription has been sent.       Could we send this information to you in [facility] or would you prefer to receive a phone call?:   Patient would prefer a phone call   Okay to leave a detailed message?: Yes at Cell number on file:    Telephone Information:   Mobile [phone]

## 2024-07-29 RX ORDER — EPINEPHRINE 0.3 MG/.3ML
0.3 INJECTION SUBCUTANEOUS
Qty: 2 EACH | Refills: 0 | Status: SHIPPED | OUTPATIENT
Start: 2024-07-29 | End: 2024-08-19

## 2024-07-30 ENCOUNTER — HOSPITAL ENCOUNTER (OUTPATIENT)
Dept: MAMMOGRAPHY | Facility: CLINIC | Age: 79
Discharge: HOME | End: 2024-07-30
Attending: FAMILY MEDICINE | Admitting: FAMILY MEDICINE
Payer: MEDICARE

## 2024-07-30 DIAGNOSIS — Z12.31 VISIT FOR SCREENING MAMMOGRAM: ICD-10-CM

## 2024-07-30 PROCEDURE — 77063 BREAST TOMOSYNTHESIS BI: CPT | Mod: 52

## 2024-08-19 DIAGNOSIS — J30.89 ENVIRONMENTAL AND SEASONAL ALLERGIES: ICD-10-CM

## 2024-08-19 DIAGNOSIS — J30.1 SEASONAL ALLERGIC RHINITIS DUE TO POLLEN: ICD-10-CM

## 2024-08-19 DIAGNOSIS — E55.9 VITAMIN D DEFICIENCY: ICD-10-CM

## 2024-08-19 DIAGNOSIS — I10 ESSENTIAL HYPERTENSION, BENIGN: ICD-10-CM

## 2024-08-19 DIAGNOSIS — E78.5 HYPERLIPIDEMIA, UNSPECIFIED HYPERLIPIDEMIA TYPE: ICD-10-CM

## 2024-08-19 DIAGNOSIS — E53.8 VITAMIN B12 DEFICIENCY: ICD-10-CM

## 2024-08-19 RX ORDER — FLUTICASONE PROPIONATE 50 MCG
2 SPRAY, SUSPENSION (ML) NASAL DAILY
Qty: 48 ML | Refills: 0 | OUTPATIENT
Start: 2024-08-19

## 2024-08-19 RX ORDER — ERGOCALCIFEROL 1.25 MG/1
CAPSULE, LIQUID FILLED ORAL
Qty: 24 CAPSULE | Refills: 3 | Status: SHIPPED | OUTPATIENT
Start: 2024-08-19

## 2024-08-19 RX ORDER — CYANOCOBALAMIN 1000 UG/ML
INJECTION, SOLUTION INTRAMUSCULAR; SUBCUTANEOUS
Qty: 6 ML | Refills: 2 | Status: SHIPPED | OUTPATIENT
Start: 2024-08-19

## 2024-08-19 RX ORDER — LISINOPRIL 40 MG/1
40 TABLET ORAL DAILY
Qty: 90 TABLET | Refills: 0 | OUTPATIENT
Start: 2024-08-19

## 2024-08-19 RX ORDER — SIMVASTATIN 40 MG
40 TABLET ORAL AT BEDTIME
Qty: 90 TABLET | Refills: 0 | OUTPATIENT
Start: 2024-08-19

## 2024-08-19 RX ORDER — EPINEPHRINE 0.3 MG/.3ML
0.3 INJECTION SUBCUTANEOUS
Qty: 2 EACH | Refills: 0 | Status: SHIPPED | OUTPATIENT
Start: 2024-08-19

## 2024-10-21 ENCOUNTER — MYC MEDICAL ADVICE (OUTPATIENT)
Dept: FAMILY MEDICINE | Facility: CLINIC | Age: 79
End: 2024-10-21
Payer: MEDICARE

## 2024-10-21 DIAGNOSIS — J30.1 SEASONAL ALLERGIC RHINITIS DUE TO POLLEN: ICD-10-CM

## 2024-10-22 RX ORDER — FLUTICASONE PROPIONATE 50 MCG
2 SPRAY, SUSPENSION (ML) NASAL DAILY
Qty: 48 ML | Refills: 2 | Status: SHIPPED | OUTPATIENT
Start: 2024-10-22

## 2024-12-03 DIAGNOSIS — M25.472 SWELLING OF BOTH ANKLES: ICD-10-CM

## 2024-12-03 DIAGNOSIS — M25.471 SWELLING OF BOTH ANKLES: ICD-10-CM

## 2024-12-03 DIAGNOSIS — I10 ESSENTIAL HYPERTENSION, BENIGN: ICD-10-CM

## 2024-12-03 RX ORDER — FUROSEMIDE 40 MG/1
40 TABLET ORAL EVERY MORNING
Qty: 90 TABLET | Refills: 0 | Status: SHIPPED | OUTPATIENT
Start: 2024-12-03

## 2024-12-03 RX ORDER — LISINOPRIL 40 MG/1
40 TABLET ORAL DAILY
Qty: 90 TABLET | Refills: 0 | Status: SHIPPED | OUTPATIENT
Start: 2024-12-03

## 2024-12-15 DIAGNOSIS — E78.5 HYPERLIPIDEMIA, UNSPECIFIED HYPERLIPIDEMIA TYPE: ICD-10-CM

## 2024-12-16 RX ORDER — SIMVASTATIN 40 MG
40 TABLET ORAL AT BEDTIME
Qty: 90 TABLET | Refills: 1 | Status: SHIPPED | OUTPATIENT
Start: 2024-12-16

## 2025-04-27 DIAGNOSIS — E78.5 HYPERLIPIDEMIA, UNSPECIFIED HYPERLIPIDEMIA TYPE: ICD-10-CM

## 2025-04-28 RX ORDER — SIMVASTATIN 40 MG
40 TABLET ORAL AT BEDTIME
Qty: 90 TABLET | Refills: 1 | OUTPATIENT
Start: 2025-04-28

## 2025-05-31 ENCOUNTER — HOSPITAL ENCOUNTER (EMERGENCY)
Facility: CLINIC | Age: 80
End: 2025-05-31
Payer: MEDICARE

## 2025-05-31 ENCOUNTER — HOSPITAL ENCOUNTER (EMERGENCY)
Facility: CLINIC | Age: 80
Discharge: HOME | End: 2025-05-31
Attending: EMERGENCY MEDICINE | Admitting: EMERGENCY MEDICINE
Payer: MEDICARE

## 2025-05-31 ENCOUNTER — OFFICE VISIT (OUTPATIENT)
Dept: URGENT CARE | Facility: URGENT CARE | Age: 80
End: 2025-05-31
Payer: MEDICARE

## 2025-05-31 ENCOUNTER — NURSE TRIAGE (OUTPATIENT)
Dept: NURSING | Facility: CLINIC | Age: 80
End: 2025-05-31
Payer: MEDICARE

## 2025-05-31 ENCOUNTER — APPOINTMENT (OUTPATIENT)
Dept: ULTRASOUND IMAGING | Facility: CLINIC | Age: 80
End: 2025-05-31
Attending: EMERGENCY MEDICINE
Payer: MEDICARE

## 2025-05-31 VITALS
BODY MASS INDEX: 26.61 KG/M2 | DIASTOLIC BLOOD PRESSURE: 68 MMHG | HEART RATE: 56 BPM | HEIGHT: 59 IN | OXYGEN SATURATION: 99 % | WEIGHT: 132 LBS | TEMPERATURE: 98 F | RESPIRATION RATE: 16 BRPM | SYSTOLIC BLOOD PRESSURE: 126 MMHG

## 2025-05-31 VITALS
DIASTOLIC BLOOD PRESSURE: 106 MMHG | SYSTOLIC BLOOD PRESSURE: 157 MMHG | OXYGEN SATURATION: 98 % | TEMPERATURE: 98.5 F | WEIGHT: 132 LBS | RESPIRATION RATE: 18 BRPM | HEART RATE: 57 BPM | BODY MASS INDEX: 26.66 KG/M2

## 2025-05-31 DIAGNOSIS — R60.0 PERIPHERAL EDEMA: ICD-10-CM

## 2025-05-31 DIAGNOSIS — R60.0 BILATERAL LOWER EXTREMITY EDEMA: Primary | ICD-10-CM

## 2025-05-31 DIAGNOSIS — E03.9 HYPOTHYROIDISM, UNSPECIFIED TYPE: ICD-10-CM

## 2025-05-31 LAB
ANION GAP SERPL CALCULATED.3IONS-SCNC: 10 MMOL/L (ref 7–15)
BASOPHILS # BLD AUTO: 0 10E3/UL (ref 0–0.2)
BASOPHILS NFR BLD AUTO: 0 %
BUN SERPL-MCNC: 16 MG/DL (ref 8–23)
CALCIUM SERPL-MCNC: 9.3 MG/DL (ref 8.8–10.4)
CHLORIDE SERPL-SCNC: 99 MMOL/L (ref 98–107)
CREAT SERPL-MCNC: 0.56 MG/DL (ref 0.51–0.95)
EGFRCR SERPLBLD CKD-EPI 2021: >90 ML/MIN/1.73M2
EOSINOPHIL # BLD AUTO: 0.1 10E3/UL (ref 0–0.7)
EOSINOPHIL NFR BLD AUTO: 3 %
ERYTHROCYTE [DISTWIDTH] IN BLOOD BY AUTOMATED COUNT: 14.4 % (ref 10–15)
GLUCOSE SERPL-MCNC: 98 MG/DL (ref 70–99)
HCO3 SERPL-SCNC: 29 MMOL/L (ref 22–29)
HCT VFR BLD AUTO: 34.9 % (ref 35–47)
HGB BLD-MCNC: 11.4 G/DL (ref 11.7–15.7)
HOLD SPECIMEN: NORMAL
HOLD SPECIMEN: NORMAL
IMM GRANULOCYTES # BLD: 0 10E3/UL
IMM GRANULOCYTES NFR BLD: 0 %
LYMPHOCYTES # BLD AUTO: 0.8 10E3/UL (ref 0.8–5.3)
LYMPHOCYTES NFR BLD AUTO: 17 %
MCH RBC QN AUTO: 30.3 PG (ref 26.5–33)
MCHC RBC AUTO-ENTMCNC: 32.7 G/DL (ref 31.5–36.5)
MCV RBC AUTO: 93 FL (ref 78–100)
MONOCYTES # BLD AUTO: 0.5 10E3/UL (ref 0–1.3)
MONOCYTES NFR BLD AUTO: 9 %
NEUTROPHILS # BLD AUTO: 3.5 10E3/UL (ref 1.6–8.3)
NEUTROPHILS NFR BLD AUTO: 71 %
NRBC # BLD AUTO: 0 10E3/UL
NRBC BLD AUTO-RTO: 0 /100
NT-PROBNP SERPL-MCNC: 679 PG/ML (ref 0–624)
PLATELET # BLD AUTO: 266 10E3/UL (ref 150–450)
POTASSIUM SERPL-SCNC: 4.5 MMOL/L (ref 3.4–5.3)
RBC # BLD AUTO: 3.76 10E6/UL (ref 3.8–5.2)
SODIUM SERPL-SCNC: 138 MMOL/L (ref 135–145)
TROPONIN T SERPL HS-MCNC: 7 NG/L
WBC # BLD AUTO: 4.9 10E3/UL (ref 4–11)

## 2025-05-31 PROCEDURE — 36416 COLLJ CAPILLARY BLOOD SPEC: CPT | Performed by: EMERGENCY MEDICINE

## 2025-05-31 PROCEDURE — 83880 ASSAY OF NATRIURETIC PEPTIDE: CPT | Performed by: EMERGENCY MEDICINE

## 2025-05-31 PROCEDURE — 36415 COLL VENOUS BLD VENIPUNCTURE: CPT | Performed by: EMERGENCY MEDICINE

## 2025-05-31 PROCEDURE — 82947 ASSAY GLUCOSE BLOOD QUANT: CPT | Performed by: EMERGENCY MEDICINE

## 2025-05-31 PROCEDURE — 84484 ASSAY OF TROPONIN QUANT: CPT | Performed by: EMERGENCY MEDICINE

## 2025-05-31 PROCEDURE — 93971 EXTREMITY STUDY: CPT | Mod: LT

## 2025-05-31 PROCEDURE — 99207 PR NO CHARGE LOS: CPT | Performed by: FAMILY MEDICINE

## 2025-05-31 PROCEDURE — 84443 ASSAY THYROID STIM HORMONE: CPT

## 2025-05-31 PROCEDURE — 3078F DIAST BP <80 MM HG: CPT | Performed by: FAMILY MEDICINE

## 2025-05-31 PROCEDURE — 93005 ELECTROCARDIOGRAM TRACING: CPT

## 2025-05-31 PROCEDURE — 3074F SYST BP LT 130 MM HG: CPT | Performed by: FAMILY MEDICINE

## 2025-05-31 PROCEDURE — 85025 COMPLETE CBC W/AUTO DIFF WBC: CPT | Performed by: EMERGENCY MEDICINE

## 2025-05-31 PROCEDURE — 99285 EMERGENCY DEPT VISIT HI MDM: CPT | Mod: 25

## 2025-05-31 RX ORDER — SENNOSIDES 8.6 MG/1
1 TABLET ORAL DAILY
COMMUNITY

## 2025-05-31 ASSESSMENT — ACTIVITIES OF DAILY LIVING (ADL)
ADLS_ACUITY_SCORE: 41

## 2025-05-31 ASSESSMENT — COLUMBIA-SUICIDE SEVERITY RATING SCALE - C-SSRS
1. IN THE PAST MONTH, HAVE YOU WISHED YOU WERE DEAD OR WISHED YOU COULD GO TO SLEEP AND NOT WAKE UP?: NO
6. HAVE YOU EVER DONE ANYTHING, STARTED TO DO ANYTHING, OR PREPARED TO DO ANYTHING TO END YOUR LIFE?: NO
2. HAVE YOU ACTUALLY HAD ANY THOUGHTS OF KILLING YOURSELF IN THE PAST MONTH?: NO

## 2025-05-31 NOTE — ED TRIAGE NOTES
"Pt arrives with bilateral leg swelling that is chronic but has been getting worse as of the beginning of this week. Increased swelling to left ankle more per pt. Hx of dvt 15 yrs ago, not currently taking thinners. On lasix for \"leg swelling.\"     Triage Assessment (Adult)       Row Name 05/31/25 1016          Triage Assessment    Airway WDL WDL        Respiratory WDL    Respiratory WDL WDL        Cardiac WDL    Cardiac WDL WDL                     "

## 2025-05-31 NOTE — PROGRESS NOTES
Urgent Care Clinic Visit    Chief Complaint   Patient presents with    Urgent Care     Ankles are swelling started 3 days, got worse yesterday and today               5/31/2025     9:16 AM   Additional Questions   Roomed by Diamond robledo   Accompanied by Self

## 2025-05-31 NOTE — TELEPHONE ENCOUNTER
Nurse Triage SBAR    Is this a 2nd Level Triage? NO    Situation:  swelling in ankles    Background:  swelling increasing in bilateral ankles, more than usual    Assessment:  takes Lasix 40mg Qday. Both ankles swollen but L>R, no discoloration. Pitting edema- lasts >8 seconds. Swelling extends above ankle to care home up calf. No swelling at level of knee. Denies pain    Protocol Recommended Disposition:   See HCP Within 4 Hours (Or PCP Triage), See More Appropriate Guideline    Recommendation: Care advice instructions given. All questions answered and call back instructions given. Encouraged patient to elevate legs until seen by provider    Routed to provider    Does the patient meet one of the following criteria for ADS visit consideration? 16+ years old, with an [facility] PCP     TIP  Providers, please consider if this condition is appropriate for management at one of our Acute and Diagnostic Services sites.     If patient is a good candidate, please use dotphrase <dot>triageresponse and select Refer to ADS to document.    Reason for Disposition   Swelling of both ankles (i.e., pedal edema)   [1] Thigh, calf, or ankle swelling AND [2] bilateral AND [3] 1 side is more swollen    Additional Information   Negative: SEVERE difficulty breathing (e.g., struggling for each breath, speaks in single words)   Negative: Looks like a broken bone or dislocated joint (e.g., crooked or deformed)   Negative: Sounds like a life-threatening emergency to the triager   Negative: Chest pain   Negative: Followed a leg injury   Negative: [1] Followed an insect bite AND [2] localized swelling (e.g., small area of puffy or swollen skin)   Negative: Swelling of one ankle joint   Negative: Swelling of knee is main symptom   Negative: Pregnant   Negative: Postpartum (from 0 to 6 weeks after delivery)   Negative: Chest pain   Negative: Followed an ankle injury   Negative: [1] Followed a bee sting AND [2] localized swelling (e.g., small area of  puffy or swollen skin)   Negative: [1] Followed an insect bite AND [2] localized swelling (e.g., small area of puffy or swollen skin)   Negative: Ankle pain is main symptom   Negative: [1] Heart failure suspected (e.g., ankle swelling, shortness of breath, weight gain) AND [2] recently in hospital for heart failure   Negative: Difficulty breathing at rest   Negative: Entire foot is cool or blue in comparison to other side   Negative: [1] Can't walk or can barely walk AND [2] new-onset   Negative: [1] Difficulty breathing with exertion (e.g., walking) AND [2] new-onset or worsening   Negative: [1] Red area or streak AND [2] fever   Negative: [1] Swelling is painful to touch AND [2] fever   Negative: [1] Cast on leg or ankle AND [2] now increased pain   Negative: SEVERE leg swelling (e.g., swelling extends above knee, entire leg is swollen, weeping fluid)   Negative: [1] Red area or streak [2] large (> 2 in. or 5 cm)   Negative: [1] Thigh or calf pain AND [2] only 1 side AND [3] present > 1 hour   Negative: [1] Thigh, calf, or ankle swelling AND [2] only 1 side    Protocols used: Ankle Swelling-A-AH, Leg Swelling and Edema-A-AH
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
## 2025-05-31 NOTE — PROGRESS NOTES
TRIAGE ENCOUNTER    Claudia Powers is a 80 year old female with a history of DVT about 14 days ago (after surgery) and with a history of chronic bilateral ankle edema for which she takes Lasix 40 mg daily.  Patient is presenting with a chief complaint of three days of swelling and pitting (lasting more than 8 seconds) at the bilateral lower extremities (left more than right) from the ankles to retirement up the calves.  .No recent changes in the Lasix dose of 40 mg daily.  The swelling has improved this morning.       No dietary changes.   No increased salt intake.  No long car/plane trips  No recent surgeries over the past month  She had one episode of DVT after surgery about 14 years ago.   No family history of blood clots  No current history of recent cancer  No worsening varicosities  No shortness of breath  No chest pain.   No recent trauma to the legs/feet.      Past Medical History:   Diagnosis Date    Actinic keratosis     history of freezing lesions    Arthritis     Breast cancer (H) 01/01/1989    left breast, age 37; initially lumpectomy, radiation, and radium implants left breast. Since then, continued to have abnormal calcifications - now s/p bilateral mastectomy (BRCA 1 and 2 positive).     Colles' fracture     1995 and 2000    Compression fracture of L1 lumbar vertebra (H) 12/23/2014    Dental caries     due to radiation of neck/jaw     Depression     History of cold sores     Hypertension     Inguinal hernia     Polymyalgia rheumatica     Pulmonary embolism (H)     Provoked (on [place], s/p tibial fracture/surgery/hospitalization). IVC filter had been placed and removed. No DVT. [place].     Rheumatic fever     Has heart murmur    Shingles     Small bowel obstruction (H)     Stress fracture of calcaneus     on MRI: posterior aspect of the os calcis near the insertion of the achilles tendon.     Thyroid disease     Vertebral compression fracture (H)     T12     Current Outpatient Medications   Medication Sig  Dispense Refill    amoxicillin (AMOXIL) 500 MG capsule Take 2,000 mg by mouth once as needed Prior to dental work      aspirin 81 MG EC tablet Take 81 mg by mouth daily HOLD while on Xarelto      calcium carbonate (OS-MARCEL) 1500 (600 Ca) MG tablet Take 1,200 mg by mouth daily      Calcium Carbonate-Vit D-Min (CALCIUM 600+D PLUS MINERALS) 600-400 MG-UNIT TABS Take 2 tablets by mouth      cyanocobalamin (CYANOCOBALAMIN) 1000 MCG/ML injection INJECT 1 ML INTO THE THIGH, SHOULDER, OR BUTTOCKS EVERY 14 DAYS 6 mL 2    EPINEPHrine (ANY BX GENERIC EQUIV) 0.3 MG/0.3ML injection 2-pack INJECT 0.3 MLS (0.3 MG) INTO THE MUSCLE ONCE AS NEEDED FOR ANAPHYLAXIS 2 each 0    fish oil-omega-3 fatty acids 1000 MG capsule Take 2 g by mouth 2 times daily      fluticasone (FLONASE) 50 MCG/ACT nasal spray Spray 2 sprays into both nostrils daily. 48 mL 2    furosemide (LASIX) 40 MG tablet Take 1 tablet (40 mg) by mouth every morning. 90 tablet 0    levothyroxine (SYNTHROID/LEVOTHROID) 75 MCG tablet Take 1 tablet (75 mcg) by mouth daily. 90 tablet 0    lisinopril (ZESTRIL) 40 MG tablet Take 1 tablet (40 mg) by mouth daily. 90 tablet 0    metoprolol succinate ER (TOPROL XL) 200 MG 24 hr tablet TAKE 1 TABLET BY MOUTH EVERY DAY 90 tablet 4    mometasone (NASONEX) 50 MCG/ACT nasal spray 2 sprays in each nostril      olopatadine (PATADAY) 0.2 % ophthalmic solution INSTILL 1 DROP INTO BOTH EYES TWICE A DAY      senna (SENOKOT) 8.6 MG tablet Take 1 tablet by mouth daily.      simvastatin (ZOCOR) 40 MG tablet TAKE 1 TABLET BY MOUTH AT BEDTIME 90 tablet 1    triamcinolone (KENALOG) 0.1 % external ointment       trospium (SANCTURA XR) 60 MG CP24 24 hr capsule TAKE 1 CAPSULE (60 MG) BY MOUTH EVERY MORNING (BEFORE BREAKFAST) 90 capsule 4    vitamin D2 (ERGOCALCIFEROL) 55525 units (1250 mcg) capsule TAKE 1 CAPSULE BY MOUTH TWICE WEEKLY 24 capsule 3    acetaminophen (TYLENOL) 650 MG CR tablet Take 650 mg by mouth every 8 hours as needed for pain       ferrous sulfate (FEROSUL) 325 (65 Fe) MG tablet TAKE 1 TABLET BY MOUTH EVERY OTHER  tablet 0    SODIUM FLUORIDE 5000 PPM 1.1 % PSTE USE AS DIRECTED TWICE A DAY         Given the patient's history of DVT and the fact that the left lower extremity is bigger than the right lower extremity, patient will go to the [facility] emergency room for further evaluation.     I gave report to the emergency room nurse today at around 9:45 pm.  I told the patient that I wouldn't charge for this triage evaluation.     Geoff Mcginnis MD

## 2025-06-01 DIAGNOSIS — E53.8 VITAMIN B12 DEFICIENCY: ICD-10-CM

## 2025-06-01 LAB
ATRIAL RATE - MUSE: 60 BPM
DIASTOLIC BLOOD PRESSURE - MUSE: NORMAL MMHG
INTERPRETATION ECG - MUSE: NORMAL
P AXIS - MUSE: 74 DEGREES
PR INTERVAL - MUSE: 182 MS
QRS DURATION - MUSE: 86 MS
QT - MUSE: 432 MS
QTC - MUSE: 432 MS
R AXIS - MUSE: 68 DEGREES
SYSTOLIC BLOOD PRESSURE - MUSE: NORMAL MMHG
T AXIS - MUSE: 12 DEGREES
VENTRICULAR RATE- MUSE: 60 BPM

## 2025-06-02 RX ORDER — CYANOCOBALAMIN 1000 UG/ML
INJECTION, SOLUTION INTRAMUSCULAR; SUBCUTANEOUS
Qty: 6 ML | Refills: 0 | Status: SHIPPED | OUTPATIENT
Start: 2025-06-02

## 2025-06-03 ENCOUNTER — OFFICE VISIT (OUTPATIENT)
Dept: PEDIATRICS | Facility: CLINIC | Age: 80
End: 2025-06-03
Payer: MEDICARE

## 2025-06-03 ENCOUNTER — RESULTS FOLLOW-UP (OUTPATIENT)
Dept: PEDIATRICS | Facility: CLINIC | Age: 80
End: 2025-06-03

## 2025-06-03 VITALS
HEART RATE: 59 BPM | WEIGHT: 130 LBS | HEIGHT: 59 IN | BODY MASS INDEX: 26.21 KG/M2 | TEMPERATURE: 97.1 F | OXYGEN SATURATION: 98 % | RESPIRATION RATE: 14 BRPM | DIASTOLIC BLOOD PRESSURE: 71 MMHG | SYSTOLIC BLOOD PRESSURE: 118 MMHG

## 2025-06-03 DIAGNOSIS — M25.472 SWELLING OF BOTH ANKLES: ICD-10-CM

## 2025-06-03 DIAGNOSIS — E78.5 HYPERLIPIDEMIA, UNSPECIFIED HYPERLIPIDEMIA TYPE: ICD-10-CM

## 2025-06-03 DIAGNOSIS — I10 ESSENTIAL HYPERTENSION: ICD-10-CM

## 2025-06-03 DIAGNOSIS — I10 ESSENTIAL HYPERTENSION, BENIGN: ICD-10-CM

## 2025-06-03 DIAGNOSIS — M25.471 SWELLING OF BOTH ANKLES: ICD-10-CM

## 2025-06-03 DIAGNOSIS — E03.9 HYPOTHYROIDISM, UNSPECIFIED TYPE: ICD-10-CM

## 2025-06-03 DIAGNOSIS — Z76.0 ENCOUNTER FOR MEDICATION REFILL: ICD-10-CM

## 2025-06-03 DIAGNOSIS — Z09 HOSPITAL DISCHARGE FOLLOW-UP: Primary | ICD-10-CM

## 2025-06-03 DIAGNOSIS — R60.0 BILATERAL LOWER EXTREMITY EDEMA: ICD-10-CM

## 2025-06-03 LAB — TSH SERPL DL<=0.005 MIU/L-ACNC: 0.81 UIU/ML (ref 0.3–4.2)

## 2025-06-03 PROCEDURE — 3078F DIAST BP <80 MM HG: CPT | Performed by: INTERNAL MEDICINE

## 2025-06-03 PROCEDURE — 3074F SYST BP LT 130 MM HG: CPT | Performed by: INTERNAL MEDICINE

## 2025-06-03 PROCEDURE — 99213 OFFICE O/P EST LOW 20 MIN: CPT | Performed by: INTERNAL MEDICINE

## 2025-06-03 RX ORDER — RESPIRATORY SYNCYTIAL VISUS VACCINE RECOMBINANT, ADJUVANTED 120MCG/0.5
KIT INTRAMUSCULAR
COMMUNITY
Start: 2024-06-24

## 2025-06-03 RX ORDER — TETANUS TOXOID, REDUCED DIPHTHERIA TOXOID AND ACELLULAR PERTUSSIS VACCINE, ADSORBED 5; 2.5; 8; 8; 2.5 [IU]/.5ML; [IU]/.5ML; UG/.5ML; UG/.5ML; UG/.5ML
SUSPENSION INTRAMUSCULAR
COMMUNITY
Start: 2024-06-24

## 2025-06-03 NOTE — PROGRESS NOTES
"  Assessment & Plan     Hospital discharge follow-up    Bilateral lower extremity edema  DVT negative.  Labs reassuring.  Pt prefers not to avoid lab draw today due to fact that she will be seeing her primary soon.  OK to defer BMP.    Hypothyroidism, unspecified type  Will add on a thyroid test.    Essential hypertension  Stable, continue medications.    Hyperlipidemia, unspecified hyperlipidemia type  Stable, continue medications.    Pt due for refills - has appt with primary in a couple of months.  Prefers to contact her PCP for shannon refills.        MED REC REQUIRED{TIP  Click the link below to document or use med rec list, use list to pull in response :006180}  Post Medication Reconciliation Status: {MED REC LIST:402692}  BMI  Estimated body mass index is 26.26 kg/m  as calculated from the following:    Height as of this encounter: 1.499 m (4' 11\").    Weight as of this encounter: 59 kg (130 lb).   {Weight Management Plan needed for ACO:970710}      {Follow-up (Optional):866293}    Viridiana Taylor is a 80 year old, presenting for the following health issues:  Hospital F/U        6/3/2025     3:40 PM   Additional Questions   Roomed by Patsy Noguera   Accompanied by n/a         6/3/2025     3:40 PM   Patient Reported Additional Medications   Patient reports taking the following new medications none     HPI      Had bilateral LE swelling.  Has had this in the past.  Used to take lasix prn.  Switched to taking it every day.  The day before, spent the day reading and watching TV.  Was sitting all day and thinks that may have contributed.  No dietary changes or fluid intake change.    TSH   Date Value Ref Range Status   06/24/2024 1.32 0.30 - 4.20 uIU/mL Final   05/02/2022 0.89 0.30 - 5.00 uIU/mL Final         {MA/LPN/RN Pre-Provider Visit Orders- hCG/UA/Strep (Optional):651429}  {SUPERLIST (Optional):977400}    Hospital Follow-up Visit:    Hospital/Nursing Home/IP Rehab Facility: [facility] " "Hospital  Most Recent Admission Date: 5/31/2025   Most Recent Admission Diagnosis:      Most Recent Discharge Date: 5/31/2025   Most Recent Discharge Diagnosis: Peripheral edema - R60.0   Do you have any other stressors you would like to discuss with your provider? { :527072}    Problems taking medications regularly:  {:588906}  Medication changes since discharge: {:754034}  Problems adhering to non-medication therapy:  {:643293}    Summary of hospitalization:  {:046497}  Diagnostic Tests/Treatments reviewed.  Follow up needed: {:268197:}  Other Healthcare Providers Involved in Patient s Care:         {those currently involved after discharge:819017::\"None\"}  Update since discharge: {:513001} {TIP  Include information from family/caregivers, SNF, Care Coordination :496720}        Plan of care communicated with {:410424}     {Reference  Coding guidelines- Moderate Complexity F2F/Video within 7 - 14 days of discharge 3175540, High Complexity F2F/Video within 7 days 8814035 or letcwf34 days 4798793 :267480}      {additonal problems for provider to add (Optional):314579}    {ROS Picklists (Optional):624412}      Objective    /71 (BP Location: Right arm, Patient Position: Sitting, Cuff Size: Adult Regular)   Pulse 59   Temp 97.1  F (36.2  C) (Temporal)   Resp 14   Ht 1.499 m (4' 11\")   Wt 59 kg (130 lb)   LMP  (LMP Unknown)   SpO2 98%   BMI 26.26 kg/m    Body mass index is 26.26 kg/m .  Physical Exam   {Exam List (Optional):748282}    {Diagnostic Test Results (Optional):293413}        Signed Electronically by: Alan Edwards MD  {Email feedback regarding this note to [email or web address]   :103840}DME (Durable Medical Equipment) Orders and Documentation  No orders of the defined types were placed in this encounter.     The patient was assessed and it was determined the patient is in need of the following listed DME Supplies/Equipment. Please complete supporting documentation " below to demonstrate medical necessity.

## 2025-06-25 ENCOUNTER — MYC REFILL (OUTPATIENT)
Dept: PEDIATRICS | Facility: CLINIC | Age: 80
End: 2025-06-25
Payer: MEDICARE

## 2025-06-25 DIAGNOSIS — E78.5 HYPERLIPIDEMIA, UNSPECIFIED HYPERLIPIDEMIA TYPE: ICD-10-CM

## 2025-06-25 DIAGNOSIS — E03.9 HYPOTHYROIDISM, UNSPECIFIED TYPE: ICD-10-CM

## 2025-06-25 DIAGNOSIS — I10 ESSENTIAL HYPERTENSION, BENIGN: ICD-10-CM

## 2025-06-25 DIAGNOSIS — Z76.0 ENCOUNTER FOR MEDICATION REFILL: ICD-10-CM

## 2025-06-25 RX ORDER — LEVOTHYROXINE SODIUM 75 UG/1
75 TABLET ORAL DAILY
Qty: 90 TABLET | Refills: 2 | Status: SHIPPED | OUTPATIENT
Start: 2025-06-25

## 2025-06-25 RX ORDER — SIMVASTATIN 40 MG
40 TABLET ORAL AT BEDTIME
Qty: 90 TABLET | Refills: 0 | Status: SHIPPED | OUTPATIENT
Start: 2025-06-25

## 2025-06-25 RX ORDER — LISINOPRIL 40 MG/1
40 TABLET ORAL DAILY
Qty: 90 TABLET | Refills: 2 | Status: SHIPPED | OUTPATIENT
Start: 2025-06-25

## 2025-06-25 NOTE — TELEPHONE ENCOUNTER
Medication Question or Refill        What medication are you calling about (include dose and sig)?: simvastatin (ZOCOR) 40 MG tablet     Preferred Pharmacy:    [facility]/pharmacy #[id number] - [place], [place] - [address] AT [place] [address]  [place] [address]  Phone: [phone] Fax: [phone]        Controlled Substance Agreement on file:   CSA -- Patient Level:    CSA: None found at the patient level.       Who prescribed the medication?:    Do you need a refill? Yes    When did you use the medication last? N/A    Patient offered an appointment? No    Do you have any questions or concerns?  No      Could we send this information to you in [place] or would you prefer to receive a phone call?:   Patient would prefer a phone call   Okay to leave a detailed message?: Yes at Cell number on file:    Telephone Information:   Mobile [phone]

## 2025-07-03 DIAGNOSIS — J30.1 SEASONAL ALLERGIC RHINITIS DUE TO POLLEN: ICD-10-CM

## 2025-07-03 RX ORDER — FLUTICASONE PROPIONATE 50 MCG
2 SPRAY, SUSPENSION (ML) NASAL DAILY
Qty: 48 ML | Refills: 2 | Status: SHIPPED | OUTPATIENT
Start: 2025-07-03

## 2025-07-23 DIAGNOSIS — M25.471 SWELLING OF BOTH ANKLES: ICD-10-CM

## 2025-07-23 DIAGNOSIS — M25.472 SWELLING OF BOTH ANKLES: ICD-10-CM

## 2025-07-23 RX ORDER — FUROSEMIDE 40 MG/1
40 TABLET ORAL EVERY MORNING
Qty: 90 TABLET | Refills: 0 | Status: SHIPPED | OUTPATIENT
Start: 2025-07-23

## 2025-08-07 DIAGNOSIS — I10 ESSENTIAL HYPERTENSION: ICD-10-CM

## 2025-08-07 RX ORDER — METOPROLOL SUCCINATE 200 MG/1
200 TABLET, EXTENDED RELEASE ORAL DAILY
Qty: 90 TABLET | Refills: 0 | Status: SHIPPED | OUTPATIENT
Start: 2025-08-07

## 2025-08-11 ENCOUNTER — TRANSFERRED RECORDS (OUTPATIENT)
Dept: ADMINISTRATIVE | Facility: CLINIC | Age: 80
End: 2025-08-11
Payer: MEDICARE

## 2025-08-12 ENCOUNTER — RESULTS FOLLOW-UP (OUTPATIENT)
Dept: GASTROENTEROLOGY | Facility: CLINIC | Age: 80
End: 2025-08-12
Payer: MEDICARE

## 2025-08-12 ENCOUNTER — HOSPITAL ENCOUNTER (OUTPATIENT)
Dept: MAMMOGRAPHY | Facility: CLINIC | Age: 80
Discharge: HOME | End: 2025-08-12
Attending: FAMILY MEDICINE
Payer: MEDICARE

## 2025-08-12 DIAGNOSIS — Z12.31 VISIT FOR SCREENING MAMMOGRAM: ICD-10-CM

## 2025-08-12 PROCEDURE — 77063 BREAST TOMOSYNTHESIS BI: CPT | Mod: 52

## 2025-08-19 ENCOUNTER — TELEPHONE (OUTPATIENT)
Dept: FAMILY MEDICINE | Facility: CLINIC | Age: 80
End: 2025-08-19
Payer: MEDICARE

## 2025-08-21 PROBLEM — K59.01 SLOW TRANSIT CONSTIPATION: Status: RESOLVED | Noted: 2017-08-07 | Resolved: 2025-08-21

## 2025-08-21 PROBLEM — D12.6 TUBULAR ADENOMA OF COLON: Status: RESOLVED | Noted: 2019-06-13 | Resolved: 2025-08-21

## 2025-08-22 ENCOUNTER — RESULTS FOLLOW-UP (OUTPATIENT)
Dept: FAMILY MEDICINE | Facility: CLINIC | Age: 80
End: 2025-08-22
Payer: MEDICARE

## 2025-08-22 DIAGNOSIS — D50.8 IRON DEFICIENCY ANEMIA SECONDARY TO INADEQUATE DIETARY IRON INTAKE: ICD-10-CM

## 2025-08-22 DIAGNOSIS — E78.5 HYPERLIPIDEMIA LDL GOAL <70: Primary | ICD-10-CM

## 2025-08-22 RX ORDER — ATORVASTATIN CALCIUM 40 MG/1
40 TABLET, FILM COATED ORAL DAILY
Qty: 90 TABLET | Refills: 4 | Status: SHIPPED | OUTPATIENT
Start: 2025-08-22

## 2025-08-22 RX ORDER — FERROUS SULFATE 325(65) MG
325 TABLET ORAL EVERY OTHER DAY
Qty: 100 TABLET | Refills: 0 | Status: SHIPPED | OUTPATIENT
Start: 2025-08-22 | End: 2026-03-10

## 2061-05-18 ENCOUNTER — RECORDS - HEALTHEAST (OUTPATIENT)
Dept: ADMINISTRATIVE | Facility: OTHER | Age: OVER 89
End: 2061-05-18